# Patient Record
Sex: FEMALE | Race: WHITE | Employment: OTHER | URBAN - METROPOLITAN AREA
[De-identification: names, ages, dates, MRNs, and addresses within clinical notes are randomized per-mention and may not be internally consistent; named-entity substitution may affect disease eponyms.]

---

## 2018-07-25 ENCOUNTER — APPOINTMENT (OUTPATIENT)
Dept: CARDIOLOGY | Facility: CLINIC | Age: 83
End: 2018-07-25
Attending: PHYSICIAN ASSISTANT
Payer: MEDICARE

## 2018-07-25 ENCOUNTER — APPOINTMENT (OUTPATIENT)
Dept: CT IMAGING | Facility: CLINIC | Age: 83
End: 2018-07-25
Attending: EMERGENCY MEDICINE
Payer: MEDICARE

## 2018-07-25 ENCOUNTER — APPOINTMENT (OUTPATIENT)
Dept: GENERAL RADIOLOGY | Facility: CLINIC | Age: 83
End: 2018-07-25
Attending: EMERGENCY MEDICINE
Payer: MEDICARE

## 2018-07-25 ENCOUNTER — HOSPITAL ENCOUNTER (OUTPATIENT)
Facility: CLINIC | Age: 83
Setting detail: OBSERVATION
Discharge: NURSING, REHAB OR LONG-TERM CARE | End: 2018-07-26
Attending: EMERGENCY MEDICINE | Admitting: INTERNAL MEDICINE
Payer: MEDICARE

## 2018-07-25 DIAGNOSIS — J20.9 ACUTE BRONCHITIS, UNSPECIFIED ORGANISM: Primary | ICD-10-CM

## 2018-07-25 DIAGNOSIS — R79.89 ELEVATED TROPONIN: ICD-10-CM

## 2018-07-25 DIAGNOSIS — R30.0 DYSURIA: ICD-10-CM

## 2018-07-25 DIAGNOSIS — R07.9 ACUTE CHEST PAIN: ICD-10-CM

## 2018-07-25 DIAGNOSIS — R79.89 TROPONIN LEVEL ELEVATED: ICD-10-CM

## 2018-07-25 DIAGNOSIS — I51.89 DIASTOLIC DYSFUNCTION: ICD-10-CM

## 2018-07-25 LAB
ALBUMIN SERPL-MCNC: 3 G/DL (ref 3.4–5)
ALP SERPL-CCNC: 89 U/L (ref 40–150)
ALT SERPL W P-5'-P-CCNC: 18 U/L (ref 0–50)
ANION GAP SERPL CALCULATED.3IONS-SCNC: 7 MMOL/L (ref 3–14)
AST SERPL W P-5'-P-CCNC: 20 U/L (ref 0–45)
BASOPHILS # BLD AUTO: 0 10E9/L (ref 0–0.2)
BASOPHILS NFR BLD AUTO: 0.3 %
BILIRUB SERPL-MCNC: 1.1 MG/DL (ref 0.2–1.3)
BUN SERPL-MCNC: 17 MG/DL (ref 7–30)
CALCIUM SERPL-MCNC: 8.4 MG/DL (ref 8.5–10.1)
CHLORIDE SERPL-SCNC: 103 MMOL/L (ref 94–109)
CO2 BLDCOV-SCNC: 26 MMOL/L (ref 21–28)
CO2 SERPL-SCNC: 25 MMOL/L (ref 20–32)
CREAT SERPL-MCNC: 1.09 MG/DL (ref 0.52–1.04)
DIFFERENTIAL METHOD BLD: ABNORMAL
EOSINOPHIL # BLD AUTO: 0 10E9/L (ref 0–0.7)
EOSINOPHIL NFR BLD AUTO: 0.1 %
ERYTHROCYTE [DISTWIDTH] IN BLOOD BY AUTOMATED COUNT: 13.2 % (ref 10–15)
ERYTHROCYTE [DISTWIDTH] IN BLOOD BY AUTOMATED COUNT: 13.4 % (ref 10–15)
GFR SERPL CREATININE-BSD FRML MDRD: 47 ML/MIN/1.7M2
GLUCOSE SERPL-MCNC: 121 MG/DL (ref 70–99)
HCT VFR BLD AUTO: 44.1 % (ref 35–47)
HCT VFR BLD AUTO: 44.3 % (ref 35–47)
HGB BLD-MCNC: 14.2 G/DL (ref 11.7–15.7)
HGB BLD-MCNC: 14.4 G/DL (ref 11.7–15.7)
IMM GRANULOCYTES # BLD: 0.1 10E9/L (ref 0–0.4)
IMM GRANULOCYTES NFR BLD: 0.4 %
INTERPRETATION ECG - MUSE: NORMAL
LACTATE BLD-SCNC: 0.8 MMOL/L (ref 0.7–2.1)
LIPASE SERPL-CCNC: 73 U/L (ref 73–393)
LYMPHOCYTES # BLD AUTO: 1.4 10E9/L (ref 0.8–5.3)
LYMPHOCYTES NFR BLD AUTO: 8.9 %
MCH RBC QN AUTO: 30.6 PG (ref 26.5–33)
MCH RBC QN AUTO: 31.1 PG (ref 26.5–33)
MCHC RBC AUTO-ENTMCNC: 32.2 G/DL (ref 31.5–36.5)
MCHC RBC AUTO-ENTMCNC: 32.5 G/DL (ref 31.5–36.5)
MCV RBC AUTO: 94 FL (ref 78–100)
MCV RBC AUTO: 97 FL (ref 78–100)
MONOCYTES # BLD AUTO: 1.4 10E9/L (ref 0–1.3)
MONOCYTES NFR BLD AUTO: 9.2 %
NEUTROPHILS # BLD AUTO: 12.7 10E9/L (ref 1.6–8.3)
NEUTROPHILS NFR BLD AUTO: 81.1 %
NRBC # BLD AUTO: 0 10*3/UL
NRBC BLD AUTO-RTO: 0 /100
NT-PROBNP SERPL-MCNC: 2610 PG/ML (ref 0–1800)
PCO2 BLDV: 38 MM HG (ref 40–50)
PH BLDV: 7.45 PH (ref 7.32–7.43)
PLATELET # BLD AUTO: 188 10E9/L (ref 150–450)
PLATELET # BLD AUTO: 199 10E9/L (ref 150–450)
PO2 BLDV: 34 MM HG (ref 25–47)
POTASSIUM SERPL-SCNC: 3.5 MMOL/L (ref 3.4–5.3)
PROCALCITONIN SERPL-MCNC: 0.19 NG/ML
PROT SERPL-MCNC: 7.5 G/DL (ref 6.8–8.8)
RBC # BLD AUTO: 4.56 10E12/L (ref 3.8–5.2)
RBC # BLD AUTO: 4.71 10E12/L (ref 3.8–5.2)
SAO2 % BLDV FROM PO2: 69 %
SODIUM SERPL-SCNC: 135 MMOL/L (ref 133–144)
TROPONIN I SERPL-MCNC: 0.12 UG/L (ref 0–0.04)
TROPONIN I SERPL-MCNC: 0.15 UG/L (ref 0–0.04)
TROPONIN I SERPL-MCNC: 0.18 UG/L (ref 0–0.04)
WBC # BLD AUTO: 12.2 10E9/L (ref 4–11)
WBC # BLD AUTO: 15.7 10E9/L (ref 4–11)

## 2018-07-25 PROCEDURE — 96366 THER/PROPH/DIAG IV INF ADDON: CPT

## 2018-07-25 PROCEDURE — 83690 ASSAY OF LIPASE: CPT | Performed by: EMERGENCY MEDICINE

## 2018-07-25 PROCEDURE — 87040 BLOOD CULTURE FOR BACTERIA: CPT | Performed by: PHYSICIAN ASSISTANT

## 2018-07-25 PROCEDURE — 25000125 ZZHC RX 250: Performed by: EMERGENCY MEDICINE

## 2018-07-25 PROCEDURE — 83605 ASSAY OF LACTIC ACID: CPT

## 2018-07-25 PROCEDURE — 85027 COMPLETE CBC AUTOMATED: CPT | Mod: XU | Performed by: INTERNAL MEDICINE

## 2018-07-25 PROCEDURE — 25000128 H RX IP 250 OP 636: Performed by: EMERGENCY MEDICINE

## 2018-07-25 PROCEDURE — 96365 THER/PROPH/DIAG IV INF INIT: CPT | Mod: 59

## 2018-07-25 PROCEDURE — 93306 TTE W/DOPPLER COMPLETE: CPT

## 2018-07-25 PROCEDURE — 36415 COLL VENOUS BLD VENIPUNCTURE: CPT | Performed by: EMERGENCY MEDICINE

## 2018-07-25 PROCEDURE — 84145 PROCALCITONIN (PCT): CPT | Performed by: PHYSICIAN ASSISTANT

## 2018-07-25 PROCEDURE — 96375 TX/PRO/DX INJ NEW DRUG ADDON: CPT

## 2018-07-25 PROCEDURE — 96361 HYDRATE IV INFUSION ADD-ON: CPT

## 2018-07-25 PROCEDURE — 85025 COMPLETE CBC W/AUTO DIFF WBC: CPT | Performed by: EMERGENCY MEDICINE

## 2018-07-25 PROCEDURE — 96368 THER/DIAG CONCURRENT INF: CPT | Mod: 59

## 2018-07-25 PROCEDURE — G0378 HOSPITAL OBSERVATION PER HR: HCPCS

## 2018-07-25 PROCEDURE — 40000274 ZZH STATISTIC RCP CONSULT EA 30 MIN

## 2018-07-25 PROCEDURE — 93005 ELECTROCARDIOGRAM TRACING: CPT

## 2018-07-25 PROCEDURE — 99285 EMERGENCY DEPT VISIT HI MDM: CPT | Mod: 25

## 2018-07-25 PROCEDURE — 25000132 ZZH RX MED GY IP 250 OP 250 PS 637: Mod: GY | Performed by: PHYSICIAN ASSISTANT

## 2018-07-25 PROCEDURE — A9270 NON-COVERED ITEM OR SERVICE: HCPCS | Mod: GY | Performed by: PHYSICIAN ASSISTANT

## 2018-07-25 PROCEDURE — 36415 COLL VENOUS BLD VENIPUNCTURE: CPT | Performed by: PHYSICIAN ASSISTANT

## 2018-07-25 PROCEDURE — 25000132 ZZH RX MED GY IP 250 OP 250 PS 637: Mod: GY | Performed by: INTERNAL MEDICINE

## 2018-07-25 PROCEDURE — 84484 ASSAY OF TROPONIN QUANT: CPT | Performed by: PHYSICIAN ASSISTANT

## 2018-07-25 PROCEDURE — 71046 X-RAY EXAM CHEST 2 VIEWS: CPT

## 2018-07-25 PROCEDURE — 96367 TX/PROPH/DG ADDL SEQ IV INF: CPT | Mod: 59

## 2018-07-25 PROCEDURE — A9270 NON-COVERED ITEM OR SERVICE: HCPCS | Mod: GY | Performed by: INTERNAL MEDICINE

## 2018-07-25 PROCEDURE — 36415 COLL VENOUS BLD VENIPUNCTURE: CPT | Performed by: INTERNAL MEDICINE

## 2018-07-25 PROCEDURE — 99214 OFFICE O/P EST MOD 30 MIN: CPT | Performed by: INTERNAL MEDICINE

## 2018-07-25 PROCEDURE — 94640 AIRWAY INHALATION TREATMENT: CPT

## 2018-07-25 PROCEDURE — 83880 ASSAY OF NATRIURETIC PEPTIDE: CPT | Performed by: PHYSICIAN ASSISTANT

## 2018-07-25 PROCEDURE — 84484 ASSAY OF TROPONIN QUANT: CPT | Performed by: EMERGENCY MEDICINE

## 2018-07-25 PROCEDURE — 93306 TTE W/DOPPLER COMPLETE: CPT | Mod: 26 | Performed by: INTERNAL MEDICINE

## 2018-07-25 PROCEDURE — 87040 BLOOD CULTURE FOR BACTERIA: CPT | Performed by: EMERGENCY MEDICINE

## 2018-07-25 PROCEDURE — 80053 COMPREHEN METABOLIC PANEL: CPT | Performed by: EMERGENCY MEDICINE

## 2018-07-25 PROCEDURE — 25000128 H RX IP 250 OP 636: Performed by: PHYSICIAN ASSISTANT

## 2018-07-25 PROCEDURE — 71260 CT THORAX DX C+: CPT

## 2018-07-25 PROCEDURE — 25000132 ZZH RX MED GY IP 250 OP 250 PS 637: Performed by: EMERGENCY MEDICINE

## 2018-07-25 PROCEDURE — 25000125 ZZHC RX 250

## 2018-07-25 PROCEDURE — A9270 NON-COVERED ITEM OR SERVICE: HCPCS | Mod: GY | Performed by: EMERGENCY MEDICINE

## 2018-07-25 PROCEDURE — 82803 BLOOD GASES ANY COMBINATION: CPT

## 2018-07-25 RX ORDER — MECLIZINE HYDROCHLORIDE 25 MG/1
25 TABLET ORAL 3 TIMES DAILY PRN
Status: ON HOLD | COMMUNITY
End: 2021-01-08

## 2018-07-25 RX ORDER — ONDANSETRON 4 MG/1
4 TABLET, ORALLY DISINTEGRATING ORAL EVERY 6 HOURS PRN
Status: DISCONTINUED | OUTPATIENT
Start: 2018-07-25 | End: 2018-07-26 | Stop reason: HOSPADM

## 2018-07-25 RX ORDER — CEFTRIAXONE 1 G/1
1 INJECTION, POWDER, FOR SOLUTION INTRAMUSCULAR; INTRAVENOUS ONCE
Status: COMPLETED | OUTPATIENT
Start: 2018-07-25 | End: 2018-07-25

## 2018-07-25 RX ORDER — IPRATROPIUM BROMIDE AND ALBUTEROL SULFATE 2.5; .5 MG/3ML; MG/3ML
SOLUTION RESPIRATORY (INHALATION)
Status: COMPLETED
Start: 2018-07-25 | End: 2018-07-25

## 2018-07-25 RX ORDER — ATENOLOL 50 MG/1
50 TABLET ORAL DAILY
Status: ON HOLD | COMMUNITY
End: 2018-07-25

## 2018-07-25 RX ORDER — ONDANSETRON 2 MG/ML
4 INJECTION INTRAMUSCULAR; INTRAVENOUS EVERY 6 HOURS PRN
Status: DISCONTINUED | OUTPATIENT
Start: 2018-07-25 | End: 2018-07-26 | Stop reason: HOSPADM

## 2018-07-25 RX ORDER — IPRATROPIUM BROMIDE AND ALBUTEROL SULFATE 2.5; .5 MG/3ML; MG/3ML
3 SOLUTION RESPIRATORY (INHALATION) ONCE
Status: COMPLETED | OUTPATIENT
Start: 2018-07-25 | End: 2018-07-25

## 2018-07-25 RX ORDER — IPRATROPIUM BROMIDE AND ALBUTEROL SULFATE 2.5; .5 MG/3ML; MG/3ML
3 SOLUTION RESPIRATORY (INHALATION)
Status: DISCONTINUED | OUTPATIENT
Start: 2018-07-25 | End: 2018-07-25

## 2018-07-25 RX ORDER — METHYLPREDNISOLONE SODIUM SUCCINATE 125 MG/2ML
125 INJECTION, POWDER, LYOPHILIZED, FOR SOLUTION INTRAMUSCULAR; INTRAVENOUS ONCE
Status: COMPLETED | OUTPATIENT
Start: 2018-07-25 | End: 2018-07-25

## 2018-07-25 RX ORDER — IOPAMIDOL 755 MG/ML
500 INJECTION, SOLUTION INTRAVASCULAR ONCE
Status: COMPLETED | OUTPATIENT
Start: 2018-07-25 | End: 2018-07-25

## 2018-07-25 RX ORDER — AMOXICILLIN 250 MG
2 CAPSULE ORAL 2 TIMES DAILY PRN
Status: DISCONTINUED | OUTPATIENT
Start: 2018-07-25 | End: 2018-07-26 | Stop reason: HOSPADM

## 2018-07-25 RX ORDER — ASPIRIN 81 MG/1
324 TABLET, CHEWABLE ORAL ONCE
Status: COMPLETED | OUTPATIENT
Start: 2018-07-25 | End: 2018-07-25

## 2018-07-25 RX ORDER — METOPROLOL SUCCINATE 50 MG/1
50 TABLET, EXTENDED RELEASE ORAL DAILY
Status: DISCONTINUED | OUTPATIENT
Start: 2018-07-25 | End: 2018-07-26 | Stop reason: HOSPADM

## 2018-07-25 RX ORDER — ALBUTEROL SULFATE 0.83 MG/ML
2.5 SOLUTION RESPIRATORY (INHALATION)
Status: DISCONTINUED | OUTPATIENT
Start: 2018-07-25 | End: 2018-07-25

## 2018-07-25 RX ORDER — FAMOTIDINE 20 MG/1
20 TABLET, FILM COATED ORAL DAILY
Status: DISCONTINUED | OUTPATIENT
Start: 2018-07-26 | End: 2018-07-26 | Stop reason: HOSPADM

## 2018-07-25 RX ORDER — AMOXICILLIN 250 MG
1 CAPSULE ORAL 2 TIMES DAILY PRN
Status: DISCONTINUED | OUTPATIENT
Start: 2018-07-25 | End: 2018-07-26 | Stop reason: HOSPADM

## 2018-07-25 RX ORDER — FAMOTIDINE 20 MG/1
20 TABLET, FILM COATED ORAL 2 TIMES DAILY
Status: DISCONTINUED | OUTPATIENT
Start: 2018-07-25 | End: 2018-07-25

## 2018-07-25 RX ORDER — ASPIRIN 81 MG/1
81 TABLET, CHEWABLE ORAL DAILY
Status: ON HOLD | COMMUNITY
End: 2021-01-08

## 2018-07-25 RX ORDER — ALBUTEROL SULFATE 0.83 MG/ML
2.5 SOLUTION RESPIRATORY (INHALATION)
Status: DISCONTINUED | OUTPATIENT
Start: 2018-07-25 | End: 2018-07-26 | Stop reason: HOSPADM

## 2018-07-25 RX ORDER — BENZONATATE 100 MG/1
100 CAPSULE ORAL 3 TIMES DAILY PRN
Status: DISCONTINUED | OUTPATIENT
Start: 2018-07-25 | End: 2018-07-26 | Stop reason: HOSPADM

## 2018-07-25 RX ORDER — MORPHINE SULFATE 4 MG/ML
1 INJECTION, SOLUTION INTRAMUSCULAR; INTRAVENOUS
Status: DISCONTINUED | OUTPATIENT
Start: 2018-07-25 | End: 2018-07-26 | Stop reason: HOSPADM

## 2018-07-25 RX ORDER — MUPIROCIN CALCIUM 20 MG/G
CREAM TOPICAL 2 TIMES DAILY PRN
Status: ON HOLD | COMMUNITY
End: 2021-01-03

## 2018-07-25 RX ORDER — MORPHINE SULFATE 2 MG/ML
2 INJECTION, SOLUTION INTRAMUSCULAR; INTRAVENOUS ONCE
Status: DISCONTINUED | OUTPATIENT
Start: 2018-07-25 | End: 2018-07-26 | Stop reason: HOSPADM

## 2018-07-25 RX ORDER — ANTIOX #8/OM3/DHA/EPA/LUT/ZEAX 250-2.5 MG
1 CAPSULE ORAL DAILY
Status: ON HOLD | COMMUNITY
End: 2021-01-08

## 2018-07-25 RX ORDER — ACETAMINOPHEN 325 MG/1
650 TABLET ORAL EVERY 4 HOURS PRN
Status: DISCONTINUED | OUTPATIENT
Start: 2018-07-25 | End: 2018-07-26 | Stop reason: HOSPADM

## 2018-07-25 RX ORDER — ASPIRIN 81 MG/1
81 TABLET, CHEWABLE ORAL DAILY
Status: DISCONTINUED | OUTPATIENT
Start: 2018-07-26 | End: 2018-07-26 | Stop reason: HOSPADM

## 2018-07-25 RX ORDER — NALOXONE HYDROCHLORIDE 0.4 MG/ML
.1-.4 INJECTION, SOLUTION INTRAMUSCULAR; INTRAVENOUS; SUBCUTANEOUS
Status: DISCONTINUED | OUTPATIENT
Start: 2018-07-25 | End: 2018-07-26 | Stop reason: HOSPADM

## 2018-07-25 RX ORDER — GUAIFENESIN/DEXTROMETHORPHAN 100-10MG/5
5 SYRUP ORAL EVERY 4 HOURS PRN
Status: DISCONTINUED | OUTPATIENT
Start: 2018-07-25 | End: 2018-07-26 | Stop reason: HOSPADM

## 2018-07-25 RX ORDER — LIDOCAINE 40 MG/G
CREAM TOPICAL
Status: DISCONTINUED | OUTPATIENT
Start: 2018-07-25 | End: 2018-07-26 | Stop reason: HOSPADM

## 2018-07-25 RX ORDER — AMOXICILLIN 250 MG
1 CAPSULE ORAL DAILY PRN
Status: ON HOLD | COMMUNITY
End: 2021-01-08

## 2018-07-25 RX ORDER — IPRATROPIUM BROMIDE AND ALBUTEROL SULFATE 2.5; .5 MG/3ML; MG/3ML
3 SOLUTION RESPIRATORY (INHALATION) EVERY 4 HOURS
Status: DISCONTINUED | OUTPATIENT
Start: 2018-07-25 | End: 2018-07-25

## 2018-07-25 RX ORDER — METOPROLOL SUCCINATE 50 MG/1
50 TABLET, EXTENDED RELEASE ORAL DAILY
Status: ON HOLD | COMMUNITY
End: 2021-01-08

## 2018-07-25 RX ORDER — SODIUM CHLORIDE 9 MG/ML
INJECTION, SOLUTION INTRAVENOUS CONTINUOUS
Status: DISCONTINUED | OUTPATIENT
Start: 2018-07-25 | End: 2018-07-25

## 2018-07-25 RX ORDER — ACETAMINOPHEN 325 MG/1
650 TABLET ORAL EVERY 8 HOURS PRN
Status: ON HOLD | COMMUNITY
End: 2021-01-08

## 2018-07-25 RX ORDER — ATORVASTATIN CALCIUM 10 MG/1
10 TABLET, FILM COATED ORAL EVERY EVENING
Status: DISCONTINUED | OUTPATIENT
Start: 2018-07-25 | End: 2018-07-26 | Stop reason: HOSPADM

## 2018-07-25 RX ORDER — POLYETHYLENE GLYCOL 3350 17 G/17G
17 POWDER, FOR SOLUTION ORAL DAILY PRN
Status: DISCONTINUED | OUTPATIENT
Start: 2018-07-25 | End: 2018-07-26 | Stop reason: HOSPADM

## 2018-07-25 RX ORDER — SODIUM CHLORIDE 9 MG/ML
1000 INJECTION, SOLUTION INTRAVENOUS CONTINUOUS
Status: DISCONTINUED | OUTPATIENT
Start: 2018-07-25 | End: 2018-07-25

## 2018-07-25 RX ADMIN — IOPAMIDOL 68 ML: 755 INJECTION, SOLUTION INTRAVENOUS at 06:31

## 2018-07-25 RX ADMIN — SODIUM CHLORIDE 82 ML: 900 INJECTION, SOLUTION INTRAVENOUS at 06:31

## 2018-07-25 RX ADMIN — METOPROLOL SUCCINATE 50 MG: 50 TABLET, EXTENDED RELEASE ORAL at 12:29

## 2018-07-25 RX ADMIN — FAMOTIDINE 20 MG: 20 TABLET, FILM COATED ORAL at 12:28

## 2018-07-25 RX ADMIN — ASPIRIN 81 MG 324 MG: 81 TABLET ORAL at 05:54

## 2018-07-25 RX ADMIN — HEPARIN SODIUM 800 UNITS/HR: 10000 INJECTION, SOLUTION INTRAVENOUS at 09:22

## 2018-07-25 RX ADMIN — IPRATROPIUM BROMIDE AND ALBUTEROL SULFATE: 2.5; .5 SOLUTION RESPIRATORY (INHALATION) at 04:19

## 2018-07-25 RX ADMIN — BENZONATATE 100 MG: 100 CAPSULE ORAL at 16:11

## 2018-07-25 RX ADMIN — METHYLPREDNISOLONE SODIUM SUCCINATE 125 MG: 125 INJECTION, POWDER, FOR SOLUTION INTRAMUSCULAR; INTRAVENOUS at 05:53

## 2018-07-25 RX ADMIN — ALBUTEROL SULFATE 2.5 MG: 2.5 SOLUTION RESPIRATORY (INHALATION) at 05:59

## 2018-07-25 RX ADMIN — IPRATROPIUM BROMIDE AND ALBUTEROL SULFATE 3 ML: .5; 3 SOLUTION RESPIRATORY (INHALATION) at 05:02

## 2018-07-25 RX ADMIN — AZITHROMYCIN MONOHYDRATE 500 MG: 500 INJECTION, POWDER, LYOPHILIZED, FOR SOLUTION INTRAVENOUS at 05:15

## 2018-07-25 RX ADMIN — SODIUM CHLORIDE: 9 INJECTION, SOLUTION INTRAVENOUS at 10:14

## 2018-07-25 RX ADMIN — SODIUM CHLORIDE 1000 ML: 9 INJECTION, SOLUTION INTRAVENOUS at 04:20

## 2018-07-25 RX ADMIN — IPRATROPIUM BROMIDE AND ALBUTEROL SULFATE 3 ML: 2.5; .5 SOLUTION RESPIRATORY (INHALATION) at 05:02

## 2018-07-25 RX ADMIN — Medication 4000 UNITS: at 09:21

## 2018-07-25 RX ADMIN — IPRATROPIUM BROMIDE AND ALBUTEROL SULFATE: .5; 3 SOLUTION RESPIRATORY (INHALATION) at 04:19

## 2018-07-25 RX ADMIN — CEFTRIAXONE SODIUM 1 G: 1 INJECTION, POWDER, FOR SOLUTION INTRAMUSCULAR; INTRAVENOUS at 04:58

## 2018-07-25 RX ADMIN — ATORVASTATIN CALCIUM 10 MG: 10 TABLET, FILM COATED ORAL at 20:13

## 2018-07-25 NOTE — PROGRESS NOTES
Cardiology follow-up note:    The patient's repeat troponin has dropped and her transthoracic echocardiogram shows preserved systolic function with no regional wall motion abnormalities.  I had a long discussion with the patient's daughters and patient.  At this time they elected medical management which I think is very reasonable.  We did discuss a stress test and have elected to not proceed with a stress test but rather medical therapy.  I would recommend antiplatelet treatment with aspirin 81 mg a day, statin therapy with atorvastatin and to also continue her beta blocker unchanged.  The patient's transthoracic echocardiogram shows grade 3 diastolic dysfunction and her N-terminal proBNP is elevated, however, she does not appear markedly volume overloaded on physical examination and her CT scan did not show pulmonary vascular congestion.  Consideration could be made for a low dose loop diuretic such as Lasix 10 mg daily to avoid acute decompensated heart failure in the future.  Cardiology will sign off at this time.  Please reconsult if needed.    Oscar Jalloh MD

## 2018-07-25 NOTE — IP AVS SNAPSHOT
[place] [address] [facility]    [address]    [facility] [address]    Phone:  [phone]    Fax:  [phone]                                       After Visit Summary   7/25/2018    Leighann Regan    MRN: [id number]           After Visit Summary Signature Page     I have received my discharge instructions, and my questions have been answered. I have discussed any challenges I see with this plan with the nurse or doctor.    ..........................................................................................................................................  Patient/Patient Representative Signature      ..........................................................................................................................................  Patient Representative Print Name and Relationship to Patient    ..................................................               ................................................  Date                                            Time    ..........................................................................................................................................  Reviewed by Signature/Title    ...................................................              ..............................................  Date                                                            Time

## 2018-07-25 NOTE — ED NOTES
Bed: ED15  Expected date:   Expected time:   Means of arrival:   Comments:  arpita 86yo fever., sob

## 2018-07-25 NOTE — PHARMACY-ADMISSION MEDICATION HISTORY
Admission medication history interview status for this patient is complete. See [facility] admission navigator for allergy information, prior to admission medications and immunization status.     Medication history interview source(s):[facility] med list   Medication history resources (including written lists, pill bottles, clinic record):{[id number]::None    Changes made to PTA medication list:  Added: all  Deleted: none  Changed: none    Actions taken by pharmacist (provider contacted, etc): Called [facility] ([phone]) for last doses.      Additional medication history information: None      Medication reconciliation/reorder completed by provider prior to medication history? No    Do you take OTC medications (eg tylenol, ibuprofen, fish oil, eye/ear drops, etc)? N(Y/N)    For patients on insulin therapy: N (Y/N)    Time spent in this activity: 15 min    Prior to Admission medications    Medication Sig Last Dose Taking? Auth Provider   acetaminophen (TYLENOL) 325 MG tablet Take 650 mg by mouth every 8 hours as needed for mild pain  at prn Yes Unknown, Entered By History   aspirin 81 MG chewable tablet Take 81 mg by mouth daily 7/24/2018 at Unknown time Yes Unknown, Entered By History   Calcium Carb-Cholecalciferol 600-800 MG-UNIT TABS Take 1 tablet by mouth daily 7/24/2018 at Unknown time Yes Unknown, Entered By History   meclizine (ANTIVERT) 25 MG tablet Take 25 mg by mouth 3 times daily as needed for dizziness  at prn Yes Unknown, Entered By History   metoprolol succinate (TOPROL-XL) 50 MG 24 hr tablet Take 50 mg by mouth daily 7/24/2018 at clarifying which bb with [facility] Yes Unknown, Entered By History   Multiple Vitamins-Minerals (PRESERVISION AREDS 2) CAPS Take 1 capsule by mouth daily 7/24/2018 at Unknown time Yes Unknown, Entered By History   mupirocin (BACTROBAN) 2 % cream Apply topically 2 times daily as needed Apply to scalp  at prn Yes Unknown, Entered By History   senna-docusate (SENOKOT-S;PERICOLACE)  8.6-50 MG per tablet Take 1 tablet by mouth daily as needed for constipation  at prn Yes Unknown, Entered By History

## 2018-07-25 NOTE — IP AVS SNAPSHOT
"          [place] [address] MEDICAL SURGICAL: [phone]                                              INTERAGENCY TRANSFER FORM - LAB / IMAGING / EKG / EMG RESULTS   2018                    Hospital Admission Date: 2018  ADELAIDA HANSON   : 1/15/1931  Sex: Female        Attending Provider: Abhishek Chavira MD     Allergies:  Codeine, Ibuprofen, Latex, Sulfa Drugs, Tramadol    Infection:  None   Service:  GENERAL MEDI    Ht:  1.651 m (5' 5\")   Wt:  83.1 kg (183 lb 3.2 oz)   Admission Wt:  82.3 kg (181 lb 6.4 oz)    BMI:  30.49 kg/m 2   BSA:  1.95 m 2            Patient PCP Information     Provider PCP Type    Zachary Sky MD General         Lab Results - 3 Days      Beta strep group A culture [[id number]]  Resulted: 18 1123, Result status: In process    Ordering provider: Abhishek Chavira MD  18 1048 Resulting lab: [facility]    Specimen Information    Type Source Collected On     18 1048            Rapid strep screen [[id number]]  Resulted: 18 1122, Result status: Final result    Ordering provider: Gabriela Garcia MD  18 1045 Resulting lab: [facility]    Specimen Information    Type Source Collected On   Throat  18 1048          Components       Value Reference Range Flag Lab   Specimen Description Throat      Rapid Strep A Screen NEGATIVE: No Group A streptococcal antigen detected by immunoassay, await culture report.   FrRdHs            UA with Microscopic reflex to Culture [[id number]] (Abnormal)  Resulted: 18 1056, Result status: Final result    Ordering provider: Brianda Watkins PA-C  18 1000 Resulting lab: [facility]    Specimen Information    Type Source Collected On   Midstream Urine Urine Midstream 18 0948          Components       Value Reference Range Flag Lab   Color Urine Yellow   FrRdHs   Appearance Urine Cloudy   FrRdHs   Glucose Urine Negative NEG^Negative mg/dL  FrRdHs   Bilirubin Urine Negative " NEG^Negative  FrRdHs   Ketones Urine Negative NEG^Negative mg/dL  FrRdHs   Specific Gravity Urine 1.026 1.003 - 1.035  FrRdHs   Blood Urine Moderate NEG^Negative A FrRdHs   pH Urine 6.0 5.0 - 7.0 pH  FrRdHs   Protein Albumin Urine 30 NEG^Negative mg/dL A FrRdHs   Urobilinogen mg/dL 0.0 0.0 - 2.0 mg/dL  FrRdHs   Nitrite Urine Negative NEG^Negative  FrRdHs   Leukocyte Esterase Urine Negative NEG^Negative  FrRdHs   Source Midstream Urine   FrRdHs   WBC Urine 2 0 - 5 /HPF  FrRdHs   RBC Urine 18 0 - 2 /HPF H FrRdHs   Bacteria Urine Few NEG^Negative /HPF A FrRdHs   Squamous Epithelial /HPF Urine 4 0 - 1 /HPF H FrRdHs   Mucous Urine Present NEG^Negative /LPF A FrRdHs            Basic metabolic panel [[id number]] (Abnormal)  Resulted: 07/26/18 0859, Result status: Final result    Ordering provider: Brianda Watkins PA-C  07/26/18 0000 Resulting lab: [facility]    Specimen Information    Type Source Collected On   Blood  07/26/18 0743          Components       Value Reference Range Flag Lab   Sodium 141 133 - 144 mmol/L  FrRdHs   Potassium 3.8 3.4 - 5.3 mmol/L  FrRdHs   Chloride 110 94 - 109 mmol/L H FrRdHs   Carbon Dioxide 24 20 - 32 mmol/L  FrRdHs   Anion Gap 7 3 - 14 mmol/L  FrRdHs   Glucose 111 70 - 99 mg/dL H FrRdHs   Urea Nitrogen 23 7 - 30 mg/dL  FrRdHs   Creatinine 1.01 0.52 - 1.04 mg/dL  FrRdHs   GFR Estimate 52 >60 mL/min/1.7m2 L FrRdHs   Comment:  Non  GFR Calc   GFR Estimate If Black 63 >60 mL/min/1.7m2  FrRdHs   Comment:  African American GFR Calc   Calcium 8.0 8.5 - 10.1 mg/dL L FrRdHs            Heparin Xa (10a) Level [[id number]]  Resulted: 07/26/18 0842, Result status: Final result    Ordering provider: Alvaro Lemons MD  07/26/18 0500 Resulting lab: [facility]    Specimen Information    Type Source Collected On   Blood  07/26/18 0743          Components       Value Reference Range Flag Lab   Heparin 10A Level <0.10 IU/mL  [facility]   Comment:          Therapeutic Range:                UFH: 0.15-0.35 IU/mL for low                     intensity dosing                     0.30-0.70 IU/mL for high                     intensity dosing for                     DVT and PE  Enoxaparin: If administered              once daily with dose              1.5mg/k.0-2.0 IU/mL              If administered              twice daily with dose              1mg/k.60-1.0 IU/mL  This test is not validated for other direct factor X inhibitors (e.g.   rivaroxaban, apixaban, edoxaban, betrixaban, fondaparinux) and should not be   used for monitoring of other medications.              CBC with platelets [[id number]] (Abnormal)  Resulted: 18 08, Result status: Final result    Ordering provider: Brianda Watkins PA-C  18 0000 Resulting lab: [facility]    Specimen Information    Type Source Collected On   Blood  18 0743          Components       Value Reference Range Flag Lab   WBC 14.5 4.0 - 11.0 10e9/L H FrRdHs   RBC Count 3.97 3.8 - 5.2 10e12/L  FrRdHs   Hemoglobin 12.4 11.7 - 15.7 g/dL  FrRdHs   Hematocrit 38.0 35.0 - 47.0 %  FrRdHs   MCV 96 78 - 100 fl  FrRdHs   MCH 31.2 26.5 - 33.0 pg  FrRdHs   MCHC 32.6 31.5 - 36.5 g/dL  FrRdHs   RDW 13.4 10.0 - 15.0 %  FrRdHs   Platelet Count 216 150 - 450 10e9/L  FrRdHs            Blood culture [[id number]]  Resulted: 18, Result status: Preliminary result    Ordering provider: Alvaro Lemons MD  18 0408 Resulting lab: [facility]    Specimen Information    Type Source Collected On   Blood  18 0412   Comment:  Right Arm          Components       Value Reference Range Flag Lab   Specimen Description Blood Right Arm      Special Requests Aerobic and anaerobic bottles received   75   Culture Micro No growth after 22 hours   225            Blood culture [[id number]]  Resulted: 18, Result status: Preliminary result    Ordering provider: Cristo  Alvaro Dixon MD  07/25/18 0412 Resulting lab: [facility]    Specimen Information    Type Source Collected On   Blood  07/25/18 0457   Comment:  Left Hand          Components       Value Reference Range Flag Lab   Specimen Description Blood Left Hand      Special Requests Received in aerobic bottle only   75   Culture Micro No growth after 22 hours   225            Blood culture [[id number]]  Resulted: 07/26/18 0646, Result status: Preliminary result    Ordering provider: Brianda Watkins PA-C  07/25/18 1000 Resulting lab: [facility]    Specimen Information    Type Source Collected On   Blood  07/25/18 1137   Comment:  Left Arm          Components       Value Reference Range Flag Lab   Specimen Description Blood Left Arm      Special Requests Aerobic and anaerobic bottles received   75   Culture Micro No growth after 15 hours   226            Troponin I [[id number]] (Abnormal)  Resulted: 07/26/18 0042, Result status: Final result    Ordering provider: Brianda Watkins PA-C  07/25/18 1800 Resulting lab: [facility]    Specimen Information    Type Source Collected On   Blood  07/26/18 0012          Components       Value Reference Range Flag Lab   Troponin I ES 0.118 0.000 - 0.045 ug/L [facility]   Comment:         The 99th percentile for upper reference range is 0.045 ug/L.  Troponin values   in the range of 0.045 - 0.120 ug/L may be associated with risks of adverse   clinical events.              Troponin I [[id number]] (Abnormal)  Resulted: 07/25/18 1833, Result status: Final result    Ordering provider: Brianda Watkins PA-C  07/25/18 1200 Resulting lab: [facility]    Specimen Information    Type Source Collected On   Blood  07/25/18 1801          Components       Value Reference Range Flag Lab   Troponin I ES 0.123 0.000 - 0.045 ug/L  FrRd   Comment:         The 99th percentile for upper reference range is 0.045 ug/L.  Troponin values   in  the range of 0.045 - 0.120 ug/L may be associated with risks of adverse   clinical events.  Consistent with previous critical result              Procalcitonin [[id number]]  Resulted: 07/25/18 1657, Result status: Final result    Ordering provider: Brianda Watkins PA-C  07/25/18 1000 Resulting lab: [facility]    Specimen Information    Type Source Collected On   Blood  07/25/18 1138          Components       Value Reference Range Flag Lab   Procalcitonin 0.19 ng/ml  FrStHsLb   Comment:         0.05-0.24 ng/ml Low risk of systemic bacterial infection. Local bacterial   infection possible.  Recommendation: Assess other clinical features of   infection. Discourage antibiotics unless strong clinical suspicion for serious   infection.              CBC with platelets [[id number]] (Abnormal)  Resulted: 07/25/18 1248, Result status: Final result    Ordering provider: Abhishek Chavira MD  07/25/18 1214 Resulting lab: [facility]    Specimen Information    Type Source Collected On   Blood  07/25/18 1232          Components       Value Reference Range Flag Lab   WBC 12.2 4.0 - 11.0 10e9/L H FrRdHs   RBC Count 4.56 3.8 - 5.2 10e12/L  FrRdHs   Hemoglobin 14.2 11.7 - 15.7 g/dL  FrRdHs   Hematocrit 44.1 35.0 - 47.0 %  FrRdHs   MCV 97 78 - 100 fl  FrRdHs   MCH 31.1 26.5 - 33.0 pg  FrRdHs   MCHC 32.2 31.5 - 36.5 g/dL  FrRdHs   RDW 13.4 10.0 - 15.0 %  FrRdHs   Platelet Count 188 150 - 450 10e9/L  FrRdHs            NT proBNP inpatient and ED [[id number]] (Abnormal)  Resulted: 07/25/18 1247, Result status: Final result    Ordering provider: Brianda Watkins PA-C  07/25/18 1000 Resulting lab: [facility]    Specimen Information    Type Source Collected On   Blood  07/25/18 1138          Components       Value Reference Range Flag Lab   N-Terminal Pro BNP Inpatient 2610 0 - 1800 pg/mL H FrStHsLb   Comment:            Reference range shown and results flagged as abnormal are suggested  inpatient   cut points for confirming diagnosis if CHF in an acute setting. Establishing a   baseline value for each individual patient is useful for follow-up. An   inpatient or emergency department NT-proPBNP <300 pg/mL effectively rules out   acute CHF, with 99% negative predictive value.  The outpatient non-acute reference range for ruling out CHF is:   0-125 pg/mL (age 18 to less than 75)   0-450 pg/mL (age 75 yrs and older)              Troponin I [[id number]] (Abnormal)  Resulted: 07/25/18 1226, Result status: Final result    Ordering provider: Brianda Watkins PA-C  07/25/18 1000 Resulting lab: [facility]    Specimen Information    Type Source Collected On   Blood  07/25/18 1138          Components       Value Reference Range Flag Lab   Troponin I ES 0.146 0.000 - 0.045 ug/L [facility]   Comment:         The 99th percentile for upper reference range is 0.045 ug/L.  Troponin values   in the range of 0.045 - 0.120 ug/L may be associated with risks of adverse   clinical events.  Consistent with previous critical result              Troponin I (STAT) [[id number]]  Resulted: 07/25/18 1138, Result status: In process    Ordering provider: Oscar Jalloh MD  07/25/18 1055 Resulting lab: [facility]    Specimen Information    Type Source Collected On   Blood  07/25/18 1138            Troponin I [[id number]] (Abnormal)  Resulted: 07/25/18 0522, Result status: Final result    Ordering provider: Alvaro Lemons MD  07/25/18 0408 Resulting lab: [facility]    Specimen Information    Type Source Collected On   Blood  07/25/18 0413          Components       Value Reference Range Flag Lab   Troponin I ES 0.184 0.000 - 0.045 ug/L [facility]   Comment:         The 99th percentile for upper reference range is 0.045 ug/L.  Troponin values   in the range of 0.045 - 0.120 ug/L may be associated with risks of adverse   clinical events.  Critical Value called to and read back by  KALEE ANDREA  0518 07.25.18 JFR              Comprehensive metabolic panel [[id number]] (Abnormal)  Resulted: 07/25/18 0446, Result status: Final result    Ordering provider: Alvaro Lemons MD  07/25/18 0408 Resulting lab: [facility]    Specimen Information    Type Source Collected On   Blood  07/25/18 0413          Components       Value Reference Range Flag Lab   Sodium 135 133 - 144 mmol/L  FrRdHs   Potassium 3.5 3.4 - 5.3 mmol/L  FrRdHs   Chloride 103 94 - 109 mmol/L  FrRdHs   Carbon Dioxide 25 20 - 32 mmol/L  FrRdHs   Anion Gap 7 3 - 14 mmol/L  FrRdHs   Glucose 121 70 - 99 mg/dL H FrRdHs   Urea Nitrogen 17 7 - 30 mg/dL  FrRdHs   Creatinine 1.09 0.52 - 1.04 mg/dL H FrRdHs   GFR Estimate 47 >60 mL/min/1.7m2 L FrRdHs   Comment:  Non  GFR Calc   GFR Estimate If Black 57 >60 mL/min/1.7m2 L FrRdHs   Comment:  African American GFR Calc   Calcium 8.4 8.5 - 10.1 mg/dL L FrRdHs   Bilirubin Total 1.1 0.2 - 1.3 mg/dL  FrRdHs   Albumin 3.0 3.4 - 5.0 g/dL L FrRdHs   Protein Total 7.5 6.8 - 8.8 g/dL  FrRdHs   Alkaline Phosphatase 89 40 - 150 U/L  FrRdHs   ALT 18 0 - 50 U/L  FrRdHs   AST 20 0 - 45 U/L  FrRdHs            Lipase [[id number]]  Resulted: 07/25/18 0446, Result status: Final result    Ordering provider: Alvaro Lemons MD  07/25/18 0408 Resulting lab: [facility]    Specimen Information    Type Source Collected On   Blood  07/25/18 0413          Components       Value Reference Range Flag Lab   Lipase 73 73 - 393 U/L  FrRdHs            Lactic acid whole blood [[id number]]  Resulted: 07/25/18 0430, Result status: In process    Ordering provider: Alvaro Lemons MD  07/25/18 0425 Resulting lab: [facility]    Specimen Information    Type Source Collected On     07/25/18 0425            CBC with platelets differential [[id number]] (Abnormal)  Resulted: 07/25/18 0425, Result status: Final result    Ordering provider: Alvaro Lemons MD  07/25/18 0408 Resulting lab:  [facility]    Specimen Information    Type Source Collected On   Blood  07/25/18 0413          Components       Value Reference Range Flag Lab   WBC 15.7 4.0 - 11.0 10e9/L H FrRdHs   RBC Count 4.71 3.8 - 5.2 10e12/L  FrRdHs   Hemoglobin 14.4 11.7 - 15.7 g/dL  FrRdHs   Hematocrit 44.3 35.0 - 47.0 %  FrRdHs   MCV 94 78 - 100 fl  FrRdHs   MCH 30.6 26.5 - 33.0 pg  FrRdHs   MCHC 32.5 31.5 - 36.5 g/dL  FrRdHs   RDW 13.2 10.0 - 15.0 %  FrRdHs   Platelet Count 199 150 - 450 10e9/L  FrRdHs   Diff Method Automated Method   FrRdHs   % Neutrophils 81.1 %  FrRdHs   % Lymphocytes 8.9 %  FrRdHs   % Monocytes 9.2 %  FrRdHs   % Eosinophils 0.1 %  FrRdHs   % Basophils 0.3 %  FrRdHs   % Immature Granulocytes 0.4 %  FrRdHs   Nucleated RBCs 0 0 /100  FrRdHs   Absolute Neutrophil 12.7 1.6 - 8.3 10e9/L H FrRdHs   Absolute Lymphocytes 1.4 0.8 - 5.3 10e9/L  FrRdHs   Absolute Monocytes 1.4 0.0 - 1.3 10e9/L H FrRdHs   Absolute Eosinophils 0.0 0.0 - 0.7 10e9/L  FrRdHs   Absolute Basophils 0.0 0.0 - 0.2 10e9/L  FrRdHs   Abs Immature Granulocytes 0.1 0 - 0.4 10e9/L  FrRdHs   Absolute Nucleated RBC 0.0   FrRdHs            Testing Performed By     Lab - Abbreviation Name Director Address Valid Date Range    12 - FrRdHs [facility] Unknown [address]  [facility] [address] 05/08/15 1057 - Present    14 - FrStHsLb [facility] Unknown [address] [place] [address] 05/08/15 1057 - Present    45 - QDX661 [facility] Unknown Unknown 01/28/02 0000 - Present    75 - Unknown [facility] Unknown [address] [facility] [address] 01/15/15 1019 - Present    225 - Unknown [facility] Unknown [address] [facility] [address] 12/19/14 0954 - Present    226 - Unknown [facility] Unknown [address] [facility] [address] 12/19/14 0955 - Present            Unresulted Labs     None         Imaging Results - 3 Days      CT Chest  Pulmonary Embolism w Contrast [[id number]]  Resulted: 07/25/18 0702, Result status: Final result    Ordering provider: Alvaro Lemons MD  07/25/18 0537 Resulted by: Paulie Zavaleta MD    Performed: 07/25/18 0621 - 07/25/18 0635 Resulting lab: RADIOLOGY RESULTS    Narrative:       CT CHEST WITH CONTRAST  7/25/2018 6:31 AM     HISTORY: Dyspnea.    COMPARISON: None.    TECHNIQUE: Following the uneventful administration of 68 mL Isovue-370  intravenous contrast, helical sections were acquired through the lungs  according to the pulmonary embolism protocol. Coronal reconstructions  were generated. Radiation dose for this scan was reduced using  automated exposure control, adjustment of the mA and/or kV according  to the patient's size, or iterative reconstruction technique.    FINDINGS: No visualized pulmonary embolus. The thoracic aorta is  normal in caliber without dissection. Atherosclerotic calcification in  the thoracic aorta. Left anterior chest wall cardiac device with lead  tips in the right atrium and right ventricle.    A few linear and band-like opacities in bilateral lung bases, most  likely representing atelectasis and/or scarring. The lungs are  otherwise clear. No pleural or pericardial effusion. No enlarged lymph  nodes in the chest. Heterogeneous thyroid gland.    Scan through the upper abdomen is unremarkable.      Impression:       IMPRESSION:  1. No visualized pulmonary embolus.  2. No evidence of active pulmonary disease.    PAULIE ZAVALETA MD      XR Chest 2 Views [[id number]]  Resulted: 07/25/18 0541, Result status: Final result    Ordering provider: Alvaro Lemons MD  07/25/18 0408 Resulted by: Paulie Zavaleta MD    Performed: 07/25/18 0439 - 07/25/18 0446 Resulting lab: RADIOLOGY RESULTS    Narrative:       CHEST 2 VIEWS  7/25/2018 4:46 AM     HISTORY: Cough. Right-sided chest pain.    COMPARISON: None.    FINDINGS: No convincing pulmonary opacities. Borderline  cardiomegaly.  Atherosclerotic calcification in the thoracic aorta. Left anterior  chest wall cardiac device with lead tips in the right atrium and right  ventricle.      Impression:       IMPRESSION: No convincing evidence of active cardiopulmonary disease.    SATISH HERNANDEZ MD      Testing Performed By     Lab - Abbreviation Name Director Address Valid Date Range    104 - Rad Rslts RADIOLOGY RESULTS Unknown Unknown 05 1553 - Present               ECG/EMG Results      Echocardiogram Complete [[id number]]  Resulted: 18 1120, Result status: Edited Result - FINAL    Ordering provider: Brianda Lowe PA-C  18 1000 Resulted by: Alvaro Paredes MD    Performed: 18 1058 - 18 1251 Resulting lab: RADIOLOGY RESULTS    Narrative:       [id number]  [id number]  [id number]  [id number]^CHRISSY^BRIANDA^S           [facility]  Echocardiography Laboratory  [address] [place] [address]        Name: ADELAIDA HANSON  MRN: [id number]  : 01/15/1931  Study Date: 2018 11:20 AM  Age: 87 yrs  Gender: Female  Patient Location: [facility]  Reason For Study: Chest Pain  Ordering Physician: BRIANDA LOWE  Referring Physician: Zachary Sky  Performed By: Junior Roy RDCS     BSA: 1.9 m2  Height: 65 in  Weight: 181 lb  HR: 55  BP: 134/69 mmHg  _____________________________________________________________________________  __        Procedure  Complete Portable Echo Adult.  _____________________________________________________________________________  __        Interpretation Summary     The left ventricle is normal in structure, function and size.  The left ventricular ejection fraction is normal.  Grade III or advanced diastolic dysfunction.  The right ventricle is normal in structure, function and size.  There is a catheter/pacemaker lead seen in the right ventricle.  There is mild (1+) mitral regurgitation.  There is mild to moderate (1-2+) tricuspid regurgitation.  The right  ventricular systolic pressure is approximated at 30mmHg plus the  right atrial pressure.  The study was technically difficult. There is no comparison study available.  _____________________________________________________________________________  __        Left Ventricle  The left ventricle is normal in structure, function and size. There is normal  left ventricular wall thickness. The left ventricular ejection fraction is  normal. Grade III or advanced diastolic dysfunction. No regional wall motion  abnormalities noted.     Right Ventricle  There is a catheter/pacemaker lead seen in the right ventricle. The right  ventricle is normal in structure, function and size.     Atria  Normal left atrial size. Right atrial size is normal. There is no atrial shunt  seen.     Mitral Valve  There is mild (1+) mitral regurgitation.        Tricuspid Valve  There is mild to moderate (1-2+) tricuspid regurgitation. The right  ventricular systolic pressure is approximated at 30mmHg plus the right atrial  pressure. Dilated IVC (>2.5cm) with <50% respiratory collapse; right atrial  pressure is estimated at 15-20mmHg.     Aortic Valve  There is mild (1+) aortic regurgitation. No aortic stenosis is present.     Pulmonic Valve  There is trace pulmonic valvular regurgitation.     Vessels  Normal size aorta. Dilated inferior vena cava.     Pericardium  There is no pericardial effusion.        Rhythm  The rhythm was undetermined. Likely paced rhythm.  _____________________________________________________________________________  __                                Report approved by: Fredi Benites 07/25/2018 01:05 PM                    _____________________________________________________________________________  __       1    Type Source Collected On     07/25/18 1120          View Image (below)              Encounter-Level Documents:     There are no encounter-level documents.      Order-Level Documents:     There are no  order-level documents.

## 2018-07-25 NOTE — UTILIZATION REVIEW
"[facility]     Admission Status; Secondary Review Determination     Admission Date: 7/25/2018  3:40 AM      Under the authority of the Utilization Management Committee, the utilization review process indicated a secondary review on the above patient.  The review outcome is based on review of the medical records, discussions with staff, and applying clinical experience noted on the date of the review.          (x) Observation Status Appropriate - This patient does not meet hospital inpatient criteria and is placed in observation status. If this patient's primary payer is Medicare and was admitted as an inpatient, Condition Code 44 should be used and patient status changed to \"observation\".     RATIONALE FOR DETERMINATION   87-year-old female with a history of hypertension, dementia, and pacemaker presented with cough and chest pain.  Troponin was elevated and she was initially thought to have non-STEMI so was started on a heparin drip.  Cardiology has been consulted and ordered an echocardiogram but believes her chest pain is atypical and not representative of ACS.  I discussed the case with KOURTNEY Watkins.  The patient otherwise is doing reasonably well and only has complaints of cough not requiring further use of antibiotics or other aggressive measures. At the time of this review, the patient does not meet medical necessity for inpatient hospitalization.     The severity of illness, intensity of service provided, expected LOS and risk for adverse outcome make the care appropriate for further observation; however, doesn't meet criteria for hospital inpatient admission. This was discussed with KOURTNEY Watkins who concurred with this determination.        The information on this document is developed by the utilization review team in order for the business office to ensure compliance.  This only denotes the appropriateness of proper admission status and does not reflect the quality of care rendered.      "    The definitions of Inpatient Status and Observation Status used in making the determination above are those provided in the CMS Coverage Manual, Chapter 1 and Chapter 6, section 70.4.      Sincerely,     Yuri Chatterjee DO MPH   Physician Advisor  Utilization Review  [facility]

## 2018-07-25 NOTE — ED PROVIDER NOTES
History     Chief Complaint:  Cough    HPI   The patient's history is limited due to her dementia.    Leighann Regan is a 87 year old female with a history of dementia and hypertension who presents via EMS with a cough. The patient lives in a nursing home, where staff reports that she has a cough and left-sided chest pain, prompting them to call EMS to transport the patient to the ED for evaluation. Of note, the patient states she does not know why she is in the ED, but that she recently walked in weed killer.    Allergies:  Codeine  Ibuprofen  Latex  Tramadol  Sulfa drugs  Iodinated contrast media- IV dye    Medications:    Aspirin   Metoprolol  Meclizine  Senexon    Past Medical History:    Dementia without behavioral disturbance  Hypertension  Seborrheic dermatitis  Macular degeneration  Chronic constipation    Past Surgical History:    No past surgical history on file.    Family History:    No family history on file.    Social History:  Marital Status:   Smoking status: Not on file  Alcohol use: Not on file     Review of Systems   Unable to perform ROS: Dementia     Physical Exam     Patient Vitals for the past 24 hrs:   BP Temp Temp src Heart Rate Resp SpO2   07/25/18 0700 130/66 - - 98 22 97 %   07/25/18 0645 133/84 - - 92 25 98 %   07/25/18 0615 - - - 94 23 98 %   07/25/18 0600 134/78 - - 88 19 -   07/25/18 0545 143/83 - - 90 24 96 %   07/25/18 0530 (!) 125/92 - - 92 23 97 %   07/25/18 0515 161/74 - - 90 12 96 %   07/25/18 0500 160/76 - - - - -   07/25/18 0441 - - - 91 - 96 %   07/25/18 0344 137/90 99.3  F (37.4  C) Oral 90 - 94 %     Physical Exam  Constitutional:  Appears well-developed and well-nourished. Alert. Conversant, but oriented to person only.  Frequent, dry cough   HENT:   Head: Atraumatic.   Nose: Nose normal.  Mouth/Throat: Oral mucosa is clear and moist. no trismus. Pharynx normal. Tonsils symmetric. No tonsillar enlargement, erythema, or exudate.  Eyes: Conjunctivae normal. EOM  normal. Pupils equal, round, and reactive to light. No scleral icterus.   Neck: Normal range of motion. Neck supple. No tracheal deviation present.   No JVD  Cardiovascular: Normal rate, regular rhythm. No gallop. No friction rub. No murmur heard. Symmetric radial artery pulses   Pulmonary/Chest: Frequent cough, but no distress or retractions.. No stridor. No respiratory distress.  Difficult to appreciate lung sounds due to frequent expiratory cough, but no definite wheezes. No rales. No rhonchi . No tenderness.   Abdominal: Soft. Bowel sounds normal. No distension. No mass. No tenderness. No rebound. No guarding.   Musculoskeletal:   RUE: Normal range of motion. No tenderness. No deformity  LUE: Normal range of motion. No tenderness. No deformity  RLE: Normal range of motion. No edema. No tenderness. No deformity  LLE: Normal range of motion. No edema. No tenderness. No deformity  Lymph: No cervical adenopathy.   Neurological: Alert and oriented to person, place, and time. Normal strength. CN II-VII intact. No sensory deficit. GCS eye subscore is 4. GCS verbal subscore is 5. GCS motor subscore is 6. Normal coordination   Skin: Skin is warm and dry. No rash noted. No pallor. Normal capillary refill.  Psychiatric:  Normal mood. Normal affect.   Emergency Department Course   ECG (3:46:29):  Rate 92 bpm. AR interval 272. QRS duration 150. QT/QTc 410/507. P-R-T axes * 102 -35. Atrial-sensed ventricular-paced rhythm with prolonged AV conduction. Abnormal ECG. Interpreted at 0349 by Alvaro Lemons MD.    Imaging:  Radiographic findings were communicated with the patient who voiced understanding of the findings.    XR Chest 2 Views:  No convincing evidence of active cardiopulmonary disease.  As read by Radiology.    CT Chest Pulmonary Embolism w Contrast:  1. No visualized pulmonary embolus.  2. No evidence of active pulmonary disease.  As read by Radiology.    Laboratory:  0425: ISTAT Gases Lactate cathleen POCT: pH  "7.45 (H), PCO2 38 (L), PO2 34, Bicarbonate 26, O2 Sat 69  CBC: WBC 15.7 (H), o/w WNL (HGB 14.4, )  CMP: Glucose 121 (H), Creatinine 1.09 (H), GFR Estimate 47 (L), Calcium 8.4 (L), Albumin 3.0 (L), o/w WNL  Lipase: 73  Troponin: 0.184 (HH)    Blood Cultures: in process    Interventions:  0419: Duoneb, 3 mL, nebulization   0420: NS 1L IV Bolus  0458: Rocephin 1 g IV  0502: Duoneb, 3 mL, nebulization   0515: Azithromycin 500 mg IV  0553: Solu-Medrol 125 mg IV  0554: Aspirin 324 mg PO  0559: Albuterol, 2.5 mL, nebulization     Emergency Department Course:  The patient arrived in the emergency department via EMS.    Past medical records, nursing notes, and vitals reviewed.  0402: I performed an exam of the patient and obtained history, as documented above.  EKG was obtained, results above.  IV inserted and blood drawn.  The patient was sent for a chest X-ray while in the emergency department, findings above.    0537: I rechecked the patient. Explained findings to the patient.  The patient was sent for a chest CT while in the emergency department, findings above.    Findings and plan explained to the patient, who has dementia and cannot consent to admission.  She is agreeable to our plan however.  I attempted to contact her family member list on her pulse form, without success.  She has \"selective cares\" checked on her post form.  At this point since she was transported to the ER from her memory care unit I would assume they would want hospitalization and monitoring, but will hold off on any invasive medications or procedures until family can provide input.    0731: I spoke with the patient's daughter regarding the patient.    0739: I spoke to Dr. Chavira of the hospitalist service who accepts the patient for admission.     0802: I rechecked the patient daughters Explained findings to the patient and her daughter.  After 7 AM her daughters arrived here in the ER.  They can provide any additional HPI.  They do " "provide some clarity about her goals of care.  She would be DNR/DNI.  They do would want to investigate possible ACS and might even consider angiogram.  They would agree with plans for aspirin and heparin and further workup.      Discussed the patient with yarelis hernandez, who will admit the patient to a tele bed for further monitoring, evaluation, and treatment.     Impression & Plan    Medical Decision Making:  Leighann Regan is a 87 year old female with dementia brought in by EMS from memory care unit for evaluation of cough and chest pain.  History is very limited because of the patient's dementia.  She can endorse that she is having pain on the right side of her chest, and by her clinical appearance it seems to be pleuritic.  Otherwise no precise history is obtainable.  She is apparently had a cough and then developed chest pain overnight tonight.  We were unable to even get any records from her primary care unit, or any advance directives, or any med list, because apparently all those records were in a locked room and could not be sent along with the ambulance that brought her in.  With effort we were able to identify her memory care unit- [facility] [place]-and they were able to fax us her post form and a med list several hours after she arrived.  It turns out that she is DNR/DNI but would want \"selective treatment\".  I see that her pulsed form was signed by a family member Rachele Ndiaye and try to contact that them number at her listed phone number.  I was unable to contact her, but I left a message .  At this point we have to assume she would want medical treatment but will not initiate CPR should she go into cardiac arrest.    With her cough and chest pain we were initially concerned about possible pneumonia.  We started the patient on empiric antibiotics and sent blood cultures.  Chest x-ray ultimately came back negative.  With her pleuritic appearing chest pain we did consider PE.  Fortunately " CTPA is negative.  After we obtain the CT scan we obtained a fax copy of her meds and allergies and she is listed to be allergic to IV contrast dye.  She has had no sign of allergic reaction while here in the ER.  The patient was frequently coughing during her serial lung exams.  I felt that she was having so much difficulty breathing out there might be some bronchospasm for her.  We did administer nebs and corticosteroids, without much improvement.  After we obtained fax copies of her med list, we see there is no clear history of COPD or asthma and no other bronchodilators her controller meds on her list.  Blood gas shows no evidence for CO2 retention or respiratory acidosis    The patient's EKG is difficult to interpret because she has a pace maker. Shows no obvious ST elevation by [place] criteria.  Troponin is elevated at 0.18.  Aspirin given.  Initially unclear what her goals of care were.  After discussion with her family they would like to investigate for ACS to see if this is truly a significant troponin or not and to see if there is any serial rise.  We will had a heparin.  Patient is not really reporting ongoing ischemic sounding pain right now.    Diagnosis:    ICD-10-CM   1. Acute chest pain R07.9   2. Troponin level elevated R74.8       Disposition:  Admitted to [facility] accepting for Dr. Fan Reyes  7/25/2018   [facility] EMERGENCY DEPARTMENT  Bhavya KAPADIA, am serving as a scribe at 4:02 AM on 7/25/2018 to document services personally performed by Alvaro Lemons MD based on my observations and the provider's statements to me.      Alvaro Lemons MD  07/25/18 3061

## 2018-07-25 NOTE — IP AVS SNAPSHOT
` `     [facility] [place] 3 MEDICAL SURGICAL: [phone]                 INTERAGENCY TRANSFER FORM - NOTES (H&P, Discharge Summary, Consults, Procedures, Therapies)   2018                    Hospital Admission Date: 2018  ADELAIDA REGAN   : 1/15/1931  Sex: Female        Patient PCP Information     Provider PCP Type    Zachary Sky MD General         History & Physicals      H&P by Brianda Watkins PA-C at 2018  7:45 AM     Author:  Brianda Watkins PA-C Service:  Internal Medicine Author Type:  Physician Assistant - C    Filed:  2018 11:31 AM Date of Service:  2018  7:45 AM Creation Time:  2018  7:45 AM    Status:  Attested :  Brianda Watkins PA-C (Physician Assistant - C)    Cosigner:  Abhishek Chavira MD at 2018 11:51 AM        Attestation signed by Abhishek Chavira MD at 2018 11:51 AM        Physician Attestation   IAbhishek, saw and evaluated Adelaida Regan as part of a shared visit.  I have reviewed and discussed with the advanced practice provider their history, physical and plan.    I personally reviewed the vital signs, medications, labs and imaging.    My key history or physical exam findings: + ttp on chest wall.    Key management decisions made by me: trend troponin ,TTE    Abhishek Chavira  Date of Service (when I saw the patient): 18                               [facility] Internal Medicine  History and Physical      Patient Name: Adelaida Regan MRN# [id number]   Age: 87 year old YOB: 1931     Date of Admission:2018    Primary care provider: No primary care provider on file.  Date of Service: 2018         Assessment and Plan:   Adelaida Regan is a 87 year old female with[EM1.1] a history of HTN, PPM, Dementia who presents from her DASIA 2/2 cough and chest pain.[EM1.2]    ED work up revealed patient normotensive, tachycardic 90s, temp 99.3, 94% on room air.  Laboratory work up revealed[EM1.1]  Troponin 0.184, creatinine 1.09, wbc 15.7[EM1.3] with otherwise normal[EM1.1] CMP, Lipase, Lactic Acid, CBC[EM1.3].[EM1.1]  CXR[EM1.3] revealed[EM1.1] no acute abnormalities[EM1.3].[EM1.1]  CT chest was negative.[EM1.3]   Patient received[EM1.1] IVF, ASA, Zithromax, Ceftriaxone, Duoneb, IV Solumedrol, Albuterol[EM1.3] and admitted for further management.[EM1.1]    Acute Chest Pain, Elevated Troponin - acute onset of right sided chest pain earlier today.[EM1.2]  No hx of any trauma.[EM1.4]  Patient complains of cough and chest pain which sounds pleuritic in nature, however troponin elevated to 0.184[EM1.2] concerning for possible ACS[EM1.5].  CXR and CT chest negative for acute abnormality.  Patient received ASA and started on a heparin infusion in the ED.  - serial troponin levels and telemetry monitoring  - check BNP  - continue heparin infusion, ASA and Betablocker  - 2d echocardiogram ordered  - Cardiology consulted.    Cough, Leukocytosis - mild cough noted by family one week ago, now with dry cough on exam and few scattered wheezes.  No hx of Asthma, COPD or Tobacco Abuse.  Imaging negative for Acute Pneumonia.  WBC elevated at 15.7 and stable on room air.  Possible viral process[EM1.2] may be contributing to above chest pain[EM1.4].  - continue scheduled duonebs  - check procalcitonin, blood cultures and UA.  Hold further antibiotics for now    HTN - bp stable.    - continue home Metoprolol.    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    - interrogate ppm     Dementia - diagnosed around 3 years ago.  Oriented to person only at baseline.  Not currently on medications.[EM1.2]       CODE:[EM1.1] Full - discussed with POA at the bedside.  Has a POLST which states DNR, but family reports they would like resuscitation and short trial of intubation if required, but not prolonged life on a ventilator.[EM1.2]  Diet/IVF:[EM1.1] regular, NS[EM1.2]  GI ppx:[EM1.1]  pepcid[EM1.2]  DVT ppx:[EM1.1] on  heparin[EM1.2]    Patient discussed with [EM1.1] Fan[EM1.2]    Brianda Watkins MS, PA-C  Physician Assistant   Hospitalist Service  Pager: [phone]           Chief Complaint:[EM1.1]   Chest Pain, Cough[EM1.2]         HPI:   87 year old female with[EM1.1] a history of HTN, PPM, Dementia who presents from her DASIA 2/2 cough and chest pain.    Patient has dementia and is oriented to person only and is currently at baseline.  Family at the bedside reports she had a mild cough last week.  Today, her skilled nursing called the POA in the middle of the night as patient reported chest pain and a cough.  EMS was called.  Patient complains of right sided chest pain worse with coughing.  Family denies hx of any lung disease, tobacco, heart disease.  Patient is otherwise without complaints, but ROS limited 2/2 hx of dementia.[EM1.2]           Past Medical History:[EM1.1]     Past Medical History:   Diagnosis Date     Constipation      Dementia      HTN (hypertension)      Macular degeneration      Pacemaker      Seborrheic dermatitis[EM1.6]           Past Surgical History:[EM1.1]     Past Surgical History:   Procedure Laterality Date     HYSTERECTOMY[EM1.6]            Social History:[EM1.1]     Social History     Social History     Marital status:      Spouse name: N/A     Number of children: 7     Years of education: N/A     Occupational History     retired      Social History Main Topics     Smoking status: Never Smoker     Smokeless tobacco: Not on file     Alcohol use No     Drug use: No     Sexual activity: Not on file     Other Topics Concern     Not on file     Social History Narrative     No narrative on file[EM1.6]          Family History:[EM1.1]     Family History   Problem Relation Age of Onset     Cerebrovascular Disease Mother      Diabetes Mother[EM1.6]           Allergies:      Allergies   Allergen Reactions     Codeine           Medications:     Prior to Admission medications    Not on File          Review of  Systems:   A complete ROS was performed and is negative other than what is stated in the HPI.       Physical Exam:   Blood pressure 130/66, temperature 99.3  F (37.4  C), temperature source Oral, resp. rate 22, SpO2 97 %.[EM1.1] on room air[EM1.2]  General: Alert, interactive, NAD[EM1.1], sitting up in bed with daughters at the bedside, frequent dry cough.  Hard of hearing.[EM1.2]  HEENT: AT/NC, sclera anicteric, PERRL, EOMI  Chest/Resp:[EM1.1] few coarse breath sounds bilaterally, few upper airway wheezes[EM1.2]  Heart/CV: regular rate and rhythm, no murmur  Abdomen/GI: Soft, nontender, nondistended. +BS.  No rebound or guarding.  Extremities/MSK:[EM1.1] Trace B[EM1.2]LE edema  Skin: Warm and dry  Neuro: Alert & oriented[EM1.1] to person only.  Not to time or place. N[EM1.2]o focal deficits, moves all extremities equally         Labs:   ROUTINE ICU LABS (Last four results)  CMP  Recent Labs  Lab 07/25/18  0413      POTASSIUM 3.5   CHLORIDE 103   CO2 25   ANIONGAP 7   *   BUN 17   CR 1.09*   GFRESTIMATED 47*   GFRESTBLACK 57*   ALESHA 8.4*   PROTTOTAL 7.5   ALBUMIN 3.0*   BILITOTAL 1.1   ALKPHOS 89   AST 20   ALT 18     CBC  Recent Labs  Lab 07/25/18  0413   WBC 15.7*   RBC 4.71   HGB 14.4   HCT 44.3   MCV 94   MCH 30.6   MCHC 32.5   RDW 13.2        INRNo lab results found in last 7 days.  Arterial Blood GasNo lab results found in last 7 days.       Imaging/Procedures:     Results for orders placed or performed during the hospital encounter of 07/25/18   XR Chest 2 Views    Narrative    CHEST 2 VIEWS  7/25/2018 4:46 AM     HISTORY: Cough. Right-sided chest pain.    COMPARISON: None.    FINDINGS: No convincing pulmonary opacities. Borderline cardiomegaly.  Atherosclerotic calcification in the thoracic aorta. Left anterior  chest wall cardiac device with lead tips in the right atrium and right  ventricle.      Impression    IMPRESSION: No convincing evidence of active cardiopulmonary  disease.    SATISH HERNANDEZ MD   CT Chest Pulmonary Embolism w Contrast    Narrative    CT CHEST WITH CONTRAST  7/25/2018 6:31 AM     HISTORY: Dyspnea.    COMPARISON: None.    TECHNIQUE: Following the uneventful administration of 68 mL Isovue-370  intravenous contrast, helical sections were acquired through the lungs  according to the pulmonary embolism protocol. Coronal reconstructions  were generated. Radiation dose for this scan was reduced using  automated exposure control, adjustment of the mA and/or kV according  to the patient's size, or iterative reconstruction technique.    FINDINGS: No visualized pulmonary embolus. The thoracic aorta is  normal in caliber without dissection. Atherosclerotic calcification in  the thoracic aorta. Left anterior chest wall cardiac device with lead  tips in the right atrium and right ventricle.    A few linear and band-like opacities in bilateral lung bases, most  likely representing atelectasis and/or scarring. The lungs are  otherwise clear. No pleural or pericardial effusion. No enlarged lymph  nodes in the chest. Heterogeneous thyroid gland.    Scan through the upper abdomen is unremarkable.      Impression    IMPRESSION:  1. No visualized pulmonary embolus.  2. No evidence of active pulmonary disease.    SATISH HERNANDEZ MD[EM1.1]            Revision History        User Key Date/Time User Provider Type Action    > EM1.4 7/25/2018 11:31 AM Molenaar, Brianda S, PA-C Physician Assistant - C Sign     [N/A] 7/25/2018  9:07 AM Molenaar, Brianda S, PA-C Physician Assistant - C Sign     EM1.5 7/25/2018  9:07 AM Molenaar, Brianda S, PA-C Physician Assistant - C Sign     EM1.6 7/25/2018  8:58 AM Molenaar, Brianda S, PA-C Physician Assistant - C      EM1.2 7/25/2018  8:44 AM Molenaar, Brianda S, PA-C Physician Assistant - C      EM1.3 7/25/2018  8:04 AM Molenaar, Brianda S, PA-C Physician Assistant - C      EM1.1 7/25/2018  7:45 AM Molenaar, Brianda S, PA-C Physician Assistant - C                    Discharge Summaries     No notes of this type exist for this encounter.         Consult Notes      Consults by Oscar Jalloh MD at 7/25/2018 11:16 AM     Author:  Oscar Jalloh MD Service:  Cardiology Author Type:  Physician    Filed:  7/25/2018 11:16 AM Date of Service:  7/25/2018 11:16 AM Creation Time:  7/25/2018 11:09 AM    Status:  Signed :  Oscar Jalloh MD (Physician)     Consult Orders:    1. Cardiology IP Consult: Patient to be seen: Routine - within 24 hours; chest pain, elevated troponin; Consultant may enter orders: Yes [[id number]] ordered by Brianda Watkins PA-C at 07/25/18 0906                [facility]    Cardiology Consultation     Date of Admission:  7/25/2018    Assessment & Plan     The patient is a pleasant 87-year-old female with a history of bradycardia status post permanent pacemaker implantation, essential hypertension, and dementia who is admitted with atypical chest pain and found to have an initial elevated troponin of 0.184.  The patient's chest pain is not cardiac in origin as it is nearly continuous and worsened with deep inspiration.  I reviewed her CT scan and did not see coronary artery calcifications.  At this time I would repeat a stat troponin and complete her transthoracic echocardiogram.  In the interim I have added a low-dose statin to her medical regimen.  If her troponin does not rise significantly or decreases I would discontinue her IV unfractionated heparin.  Further recommendations will be based on the results of the transthoracic echocardiogram and repeat troponin.  I will defer to the internal medicine team evaluation of her noncardiac pleuritic chest pain.    Oscar Jlaloh MD    Primary Care Physician   Zachary Sky    Reason for Consult   Reason for consult: I was asked by internal medicine to evaluate this patient for chest pain.    History of Present Illness   Leighann Regan is a 87 year old female who presents with right-sided  chest pain which is worsened by deep inspiration.  The patient has a history of permanent pacemaker implantation in [facility] in 2006, essential hypertension and dementia.  Much of the history is obtained from the chart and daughter.  The patient is on sure when her chest pain began but states it hasn't is continuous.  She denies any other associated chest tightness or heaviness.  The pain on the right side of her chest is worsened when she takes in a deep inspiration.  A CT scan was performed showing no pulmonary abnormalities all of the patient does have an elevated white count.  I did review the CT myself and there is no evidence for coronary artery calcification.  Her initial troponin has come back elevated at 0.184.  Her ECG shows a ventricularly paced rhythm.  A transthoracic echogram is currently pending.    Patient Active Problem List   Diagnosis     Elevated troponin       Past Medical History   I have reviewed this patient's medical history and updated it with pertinent information if needed.   Past Medical History:   Diagnosis Date     Constipation      Dementia      HTN (hypertension)      Macular degeneration      Pacemaker      Seborrheic dermatitis        Past Surgical History   I have reviewed this patient's surgical history and updated it with pertinent information if needed.  Past Surgical History:   Procedure Laterality Date     HYSTERECTOMY         Prior to Admission Medications   Prior to Admission Medications   Prescriptions Last Dose Informant Patient Reported? Taking?   Calcium Carb-Cholecalciferol 600-800 MG-UNIT TABS 7/24/2018 at Unknown time  Yes Yes   Sig: Take 1 tablet by mouth daily   Multiple Vitamins-Minerals (PRESERVISION AREDS 2) CAPS 7/24/2018 at Unknown time  Yes Yes   Sig: Take 1 capsule by mouth daily   acetaminophen (TYLENOL) 325 MG tablet  at prn  Yes Yes   Sig: Take 650 mg by mouth every 8 hours as needed for mild pain   aspirin 81 MG chewable tablet 7/24/2018 at  Unknown time  Yes Yes   Sig: Take 81 mg by mouth daily   meclizine (ANTIVERT) 25 MG tablet  at prn  Yes Yes   Sig: Take 25 mg by mouth 3 times daily as needed for dizziness   metoprolol succinate (TOPROL-XL) 50 MG 24 hr tablet 7/24/2018 at clarifying which bb with NH  Yes Yes   Sig: Take 50 mg by mouth daily   mupirocin (BACTROBAN) 2 % cream  at prn  Yes Yes   Sig: Apply topically 2 times daily as needed Apply to scalp   senna-docusate (SENOKOT-S;PERICOLACE) 8.6-50 MG per tablet  at prn  Yes Yes   Sig: Take 1 tablet by mouth daily as needed for constipation      Facility-Administered Medications: None     Current Facility-Administered Medications   Medication Dose Route Frequency     [START ON 7/26/2018] aspirin  81 mg Oral Daily     atorvastatin  10 mg Oral QPM     azithromycin  500 mg Intravenous Q24H     famotidine  20 mg Oral BID     ipratropium - albuterol 0.5 mg/2.5 mg/3 mL  3 mL Nebulization 4x daily     ipratropium - albuterol 0.5 mg/2.5 mg/3 mL  3 mL Nebulization Q4H     metoprolol succinate  50 mg Oral Daily     morphine (PF)  2 mg Intravenous Once     sodium chloride (PF)  3 mL Intracatheter Q8H     Current Facility-Administered Medications   Medication Last Rate     HEParin 800 Units/hr (07/25/18 0922)     - MEDICATION INSTRUCTIONS -       sodium chloride 75 mL/hr at 07/25/18 1014     Allergies   Allergies   Allergen Reactions     Codeine        Social History    reports that she has never smoked. She does not have any smokeless tobacco history on file. She reports that she does not drink alcohol or use illicit drugs.    Family History   Family History   Problem Relation Age of Onset     Cerebrovascular Disease Mother      Diabetes Mother        Review of Systems   The comprehensive 10 point Review of Systems is negative other than noted in the HPI or here.     Physical Exam   Vital Signs with Ranges  Temp:  [99.3  F (37.4  C)] 99.3  F (37.4  C)  Pulse:  [65] 65  Heart Rate:  [] 92  Resp:  [8-27]  "24  BP: (125-161)/() 144/60  SpO2:  [92 %-98 %] 92 %  Wt Readings from Last 4 Encounters:   07/25/18 82.1 kg (181 lb)            Vitals: /60 (BP Location: Left arm)  Pulse 65  Temp 99.3  F (37.4  C) (Oral)  Resp 24  Ht 1.651 m (5' 5\")  Wt 82.1 kg (181 lb)  SpO2 92%  BMI 30.12 kg/m2    Elderly female who appears stated age in no acute distress  No JVD  No scleral icterus  Heart is distant but regular  Lungs are clear  Abdomen is soft and nontender  Lower extremities edema  She is alert but only oriented to herself  Musculoskeletal exam does not reveal any gross M normalities of her major joints  Dermatologic exam does not reveal any significant rashes or ecchymoses unexposed areas of skin      Recent Labs  Lab 07/25/18  0413   TROPI 0.184*         Recent Labs  Lab 07/25/18  0413   WBC 15.7*   HGB 14.4   MCV 94         POTASSIUM 3.5   CHLORIDE 103   CO2 25   BUN 17   CR 1.09*   GFRESTIMATED 47*   GFRESTBLACK 57*   ANIONGAP 7   ALESHA 8.4*   *   ALBUMIN 3.0*   PROTTOTAL 7.5   BILITOTAL 1.1   ALKPHOS 89   ALT 18   AST 20   LIPASE 73   TROPI 0.184*     No results for input(s): CHOL, HDL, LDL, TRIG, CHOLHDLRATIO in the last 62610 hours.    Recent Labs  Lab 07/25/18  0413   WBC 15.7*   HGB 14.4   HCT 44.3   MCV 94          Recent Labs  Lab 07/25/18  0425   PHV 7.45*   PO2V 34   PCO2V 38*   HCO3V 26     No results for input(s): NTBNPI, NTBNP in the last 168 hours.  No results for input(s): DD in the last 168 hours.  No results for input(s): SED, CRP in the last 168 hours.    Recent Labs  Lab 07/25/18  0413        No results for input(s): TSH in the last 168 hours.  No results for input(s): COLOR, APPEARANCE, URINEGLC, URINEBILI, URINEKETONE, SG, UBLD, URINEPH, PROTEIN, UROBILINOGEN, NITRITE, LEUKEST, RBCU, WBCU in the last 168 hours.    Imaging:  Recent Results (from the past 48 hour(s))   XR Chest 2 Views    Narrative    CHEST 2 VIEWS  7/25/2018 4:46 AM     HISTORY: Cough. " Right-sided chest pain.    COMPARISON: None.    FINDINGS: No convincing pulmonary opacities. Borderline cardiomegaly.  Atherosclerotic calcification in the thoracic aorta. Left anterior  chest wall cardiac device with lead tips in the right atrium and right  ventricle.      Impression    IMPRESSION: No convincing evidence of active cardiopulmonary disease.    SATISH HERNANDEZ MD   CT Chest Pulmonary Embolism w Contrast    Narrative    CT CHEST WITH CONTRAST  7/25/2018 6:31 AM     HISTORY: Dyspnea.    COMPARISON: None.    TECHNIQUE: Following the uneventful administration of 68 mL Isovue-370  intravenous contrast, helical sections were acquired through the lungs  according to the pulmonary embolism protocol. Coronal reconstructions  were generated. Radiation dose for this scan was reduced using  automated exposure control, adjustment of the mA and/or kV according  to the patient's size, or iterative reconstruction technique.    FINDINGS: No visualized pulmonary embolus. The thoracic aorta is  normal in caliber without dissection. Atherosclerotic calcification in  the thoracic aorta. Left anterior chest wall cardiac device with lead  tips in the right atrium and right ventricle.    A few linear and band-like opacities in bilateral lung bases, most  likely representing atelectasis and/or scarring. The lungs are  otherwise clear. No pleural or pericardial effusion. No enlarged lymph  nodes in the chest. Heterogeneous thyroid gland.    Scan through the upper abdomen is unremarkable.      Impression    IMPRESSION:  1. No visualized pulmonary embolus.  2. No evidence of active pulmonary disease.    SATISH HERNANDEZ MD       Echo:  No results found for this or any previous visit (from the past 4320 hour(s)).[RD1.1]                  Revision History        User Key Date/Time User Provider Type Action    > RD1.1 7/25/2018 11:16 AM Oscar Jalloh MD Physician Sign                     Progress Notes - Physician (Notes from  07/23/18 through 07/26/18)      ED Notes by Radha Mills MD at 7/25/2018  9:42 AM     Author:  Radha Mills MD Service:  (none) Author Type:  Physician    Filed:  7/26/2018  1:48 PM Date of Service:  7/25/2018  9:42 AM Creation Time:  7/25/2018  9:42 AM    Status:  Signed :  Radha Mills MD (Physician)         Signed out by Dr Lemons at change of shift.  In brief, patient has history of dementia and presented with cough and chest pain.  Work-up included negative CT PA, positive troponin, paced EKG.  Patient is on heparin.  DNR/DNI but family interested in other interventions including possible angiogram if indicated medically.[CG1.1]    No further needs in ED prior to admission.[CG1.2]           Radha Mills MD  07/26/18 1348  [CG1.3]     Revision History        User Key Date/Time User Provider Type Action    > CG1.3 7/26/2018  1:48 PM Radha Mills MD Physician Sign     CG1.2 7/26/2018  1:47 PM Radha Mills MD Physician      CG1.1 7/25/2018  9:43 AM Radha Mills MD Physician Share            ED Provider Notes by Alvaro Lemons MD at 7/25/2018  3:39 AM     Author:  Alvaro Lemons MD Service:  Emergency Medicine Author Type:  Physician    Filed:  7/25/2018  5:28 PM Date of Service:  7/25/2018  3:39 AM Creation Time:  7/25/2018  3:52 AM    Status:  Signed :  Alvaro Lemons MD (Physician)           History     Chief Complaint:[SP1.1]  Cough[SP1.2]    HPI[SP1.1]   The patient's history is limited due to her dementia.[SP1.2]    Leighann Regan is a 87 year old female[SP1.1] with a history of dementia[SP1.2] and[SP1.3] hypertension who[SP1.4] presents[SP1.1] via EMS[SP1.5] with[SP1.1] a cough. The patient lives in a nursing home, where staff reports that she has a cough and left-sided chest pain, prompting them to call EMS to transport the patient to the ED for evaluation.[SP1.2] Of note, the patient  states she does not know why she is in the ED[SP1.6], but[SP1.7] that she[SP1.6] recently[SP1.7] walked in weed killer.[SP1.6]    Allergies:[SP1.1]  Codeine[SP1.5]  Ibuprofen  Latex  Tramadol  Sulfa drugs  Iodinated contrast media- IV dye[SP1.3]    Medications:[SP1.1]    Aspirin   Metoprolol  Meclizine  Senexon[SP1.3]    Past Medical History:[SP1.1]    Dementia[SP1.2] without behavioral disturbance  Hypertension  Seborrheic dermatitis  Macular degeneration  Chronic constipation[SP1.3]    Past Surgical History:[SP1.1]    No past surgical history on file.[SP1.2]    Family History:[SP1.1]    No family history on file.[SP1.2]    Social History:[SP1.1]  Marital Status: [SP1.3]  Smoking status: Not on file  Alcohol use: Not on[SP1.2] file[SP1.6]     Review of Systems[SP1.1]   Unable to perform ROS[SP1.2]:[SP1.1] Dementia[SP1.2]     Physical Exam[SP1.1]     Patient Vitals for the past 24 hrs:   BP Temp Temp src Heart Rate Resp SpO2   07/25/18 0700 130/66 - - 98 22 97 %   07/25/18 0645 133/84 - - 92 25 98 %   07/25/18 0615 - - - 94 23 98 %   07/25/18 0600 134/78 - - 88 19 -   07/25/18 0545 143/83 - - 90 24 96 %   07/25/18 0530 (!) 125/92 - - 92 23 97 %   07/25/18 0515 161/74 - - 90 12 96 %   07/25/18 0500 160/76 - - - - -   07/25/18 0441 - - - 91 - 96 %   07/25/18 0344 137/90 99.3  F (37.4  C) Oral 90 - 94 %[SP1.8]     Physical Exam[SP1.1]  Constitutional:  Appears well-developed and well-nourished. Alert. Conversant, but oriented to person only.  Frequent, dry cough   HENT:   Head: Atraumatic.   Nose: Nose normal.  Mouth/Throat: Oral mucosa is clear and moist. no trismus. Pharynx normal. Tonsils symmetric. No tonsillar enlargement, erythema, or exudate.  Eyes: Conjunctivae normal. EOM normal. Pupils equal, round, and reactive to light. No scleral icterus.   Neck: Normal range of motion. Neck supple. No tracheal deviation present.   No JVD  Cardiovascular: Normal rate, regular rhythm. No gallop. No friction rub. No  murmur heard. Symmetric radial artery pulses   Pulmonary/Chest: Frequent cough, but no distress or retractions.. No stridor. No respiratory distress.  Difficult to appreciate lung sounds due to frequent expiratory cough, but no definite wheezes. No rales. No rhonchi . No tenderness.   Abdominal: Soft. Bowel sounds normal. No distension. No mass. No tenderness. No rebound. No guarding.   Musculoskeletal:   RUE: Normal range of motion. No tenderness. No deformity  LUE: Normal range of motion. No tenderness. No deformity  RLE: Normal range of motion. No edema. No tenderness. No deformity  LLE: Normal range of motion. No edema. No tenderness. No deformity  Lymph: No cervical adenopathy.   Neurological: Alert and oriented to person, place, and time. Normal strength. CN II-VII intact. No sensory deficit. GCS eye subscore is 4. GCS verbal subscore is 5. GCS motor subscore is 6. Normal coordination   Skin: Skin is warm and dry. No rash noted. No pallor. Normal capillary refill.  Psychiatric:  Normal mood. Normal affect.[MP1.1]   Emergency Department Course   ECG (3:46:29):  Rate 92 bpm. VA interval 272. QRS duration 150. QT/QTc 410/507. P-R-T axes * 102 -35. Atrial-sensed ventricular-paced rhythm with prolonged AV conduction. Abnormal ECG. Interpreted at 0349 by Alvaro Lemons MD.    Imaging:[SP1.1]  Radiographic findings were communicated with the[SP1.6] patient[SP1.9] who voiced understanding of the findings.    XR Chest 2 Views:[SP1.6]  No convincing evidence of active cardiopulmonary disease.[SP1.10]  As read by Radiology.[SP1.6]    CT Chest Pulmonary Embolism w Contrast:[SP1.11]  1. No visualized pulmonary embolus.  2. No evidence of active pulmonary disease.[MP1.1]  As read by Radiology.[SP1.11]    Laboratory:[SP1.1]  0425: ISTAT Gases Lactate cathleen POCT: pH 7.45 (H), PCO2 38 (L), PO2 34, Bicarbonate 26, O2 Sat 69[SP1.12]  CBC:[SP1.6] WBC 15.7 (H), o/w[SP1.12] WNL (HGB[SP1.6] 14.4[SP1.12], PLT[SP1.6]  "199[SP1.12])  CMP:[SP1.6] Glucose 121 (H),[SP1.10] Creatinine[SP1.6] 1.09 (H), GFR Estimate 47 (L), Calcium 8.4 (L), Albumin 3.0 (L), o/w WNL[SP1.10]  Lipase:[SP1.6] 73[SP1.10]  Troponin:[SP1.6] 0.184 (HH)[SP1.10]    Blood Cultures: in process[SP1.6]    Interventions:[SP1.1]  0419: Duoneb, 3 mL, nebulization   0420: NS 1L IV Bolus[SP1.13]  0458: Rocephin 1 g IV  0502: Duoneb, 3 mL, nebulization   0515: Azithromycin 50[SP1.10]0[SP1.14] mg IV[SP1.10]  0553: Solu-Medrol 125 mg IV[SP1.15]  0554: Aspirin 324 mg PO[SP1.16]  0559: Albuterol, 2.5 mL, nebulization[SP1.14]     Emergency Department Course:[SP1.1]  The patient arrived in the emergency department via EMS.[SP1.5]    Past medical records, nursing notes, and vitals reviewed.[SP1.1]  0402[SP1.2]: I performed an exam of the patient and obtained history, as documented above.  EKG was obtained, results above.[SP1.1]  IV inserted and blood drawn.  The patient was sent for a chest X-ray while in the emergency department, findings above.[SP1.6]    0537[SP1.17]: I rechecked the patient. Explained findings to[SP1.1] the patient[SP1.17].[SP1.1]  The patient was sent for a chest CT while in the emergency department, findings above.[SP1.11]    Findings and plan explained to the[SP1.15] patient[SP1.7], who has dementia and cannot[MP1.1] consent to admission.[SP1.15]  She is agreeable to our plan however.  I attempted to contact her family member list on her pulse form, without success.  She has \"selective cares\" checked on her post form.  At this point since she was transported to the ER from her memory care unit I would assume they would want hospitalization and monitoring, but will hold off on any invasive medications or procedures until family can provide input.[MP1.1]    1431[SP1.3]: I spoke with the patient's daughter regarding the patient.[SP1.18]    4539[SP1.19]: I spoke to Dr. Hernandez[SP1.20]a[SP1.4] of the hospitalist service who accepts the patient for " "admission.[SP1.20]     0802: I rechecked the patient[SP1.21] daughters[MP1.2] Explained findings to the patient and her daughter.[SP1.21]  After 7 AM her daughters arrived here in the ER.  They can provide any additional HPI.  They do provide some clarity about her goals of care.  She would be DNR/DNI.  They do would want to investigate possible ACS and might even consider angiogram.  They would agree with plans for aspirin and heparin and further workup.[MP1.2]      Discussed the patient with[SP1.15] yarelis hernandez[MP1.2], who will admit the patient to a[SP1.15] tele[MP1.2] bed for further monitoring, evaluation, and treatment.[SP1.15]     Impression & Plan    Medical Decision Making:[SP1.1]  Leighann Regan is a 87 year old female with dementia brought in by EMS from memory care unit for evaluation of cough and chest pain.  History is very limited because of the patient's dementia.  She can endorse that she is having pain on the right side of her chest, and by her clinical appearance it seems to be pleuritic.  Otherwise no precise history is obtainable.  She is apparently had a cough and then developed chest pain overnight tonight.  We were unable to even get any records from her primary care unit, or any advance directives, or any med list, because apparently all those records were in a locked room and could not be sent along with the ambulance that brought her in.  With effort we were able to identify her memory care unit- [facility] [place]-and they were able to fax us her post form and a med list several hours after she arrived.  It turns out that she is DNR/DNI but would want \"selective treatment\".  I see that her pulsed form was signed by a family member Rachele Ndiaye and try to contact that them number at her listed phone number.  I was unable to contact her, but I left a message .  At this point we have to assume she would want medical treatment but will not initiate CPR should she go into " cardiac arrest.    With her cough and chest pain we were initially concerned about possible pneumonia.  We started the patient on empiric antibiotics and sent blood cultures.  Chest x-ray ultimately came back negative.  With her pleuritic appearing chest pain we did consider PE.  Fortunately CTPA is negative.  After we obtain the CT scan we obtained a fax copy of her meds and allergies and she is listed to be allergic to IV contrast dye.  She has had no sign of allergic reaction while here in the ER.  The patient was frequently coughing during her serial lung exams.  I felt that she was having so much difficulty breathing out there might be some bronchospasm for her.  We did administer nebs and corticosteroids, without much improvement.  After we obtained fax copies of her med list, we see there is no clear history of COPD or asthma and no other bronchodilators her controller meds on her list.  Blood gas shows no evidence for CO2 retention or respiratory acidosis    The patient's EKG is difficult to interpret because she has a pace maker. Shows no obvious ST elevation by [place] criteria.  Troponin is elevated at 0.18.[MP1.1]  Aspirin given.  Initially unclear what her goals of care were.  After discussion with her family they would like to investigate for ACS to see if this is truly a significant troponin or not and to see if there is any serial rise.  We will had a heparin.  Patient is not really reporting ongoing ischemic sounding pain right now.[MP1.2]    Diagnosis:[SP1.1]    ICD-10-CM   1. Acute chest pain R07.9   2. Troponin level elevated R74.8[MP1.3]       Disposition:[SP1.1]  Admitted to[SP1.15] hospitals [facility] accepting for[MP1.2] [SP1.15] Fan[SP1.20]      Bhavya Reyes  7/25/2018   [facility] EMERGENCY DEPARTMENT[SP1.1]  I, Bhavya Reyes, am serving as a scribe at 4:02 AM on 7/25/2018 to document services personally performed by Alvaro Lemons MD based on my  observations and the provider's statements to me.[SP1.2]      Alvaro Lemons MD  07/25/18 1728  [MP1.4]     Revision History        User Key Date/Time User Provider Type Action    > MP1.4 7/25/2018  5:28 PM Alvaro Lemons MD Physician Sign     MP1.2 7/25/2018  5:25 PM Alvaro Lemons MD Physician      SP1.21 7/25/2018  8:02 AM Pressman, Bhavya Scribe Share     SP1.8 7/25/2018  8:01 AM Pressman, Bhavya Scribe Share     [N/A] 7/25/2018  7:53 AM Pressman, Bhavya Scribe Share     [N/A] 7/25/2018  7:49 AM Pressman, Bhavya Scribe Share     [N/A] 7/25/2018  7:48 AM Pressman, Bhavya Scribe Share     SP1.4 7/25/2018  7:46 AM Pressman, Bhavya Scribe Share     SP1.9 7/25/2018  7:41 AM Pressman, Bhavya Scribe Share     SP1.19 7/25/2018  7:39 AM Pressman, Bhavya Scribe      SP1.20 7/25/2018  7:38 AM Pressman, Bhavya Scribe Share     SP1.3 7/25/2018  7:34 AM Pressman, Bhavya Scribe Share     [N/A] 7/25/2018  7:29 AM Pressman, Bhavya Scribe Share     SP1.18 7/25/2018  7:28 AM Pressman, Bhavya Scribe Share     MP1.3 7/25/2018  7:27 AM Alvaro Lemons MD Physician Share     MP1.1 7/25/2018  7:19 AM Alvaro Lemons MD Physician      [N/A] 7/25/2018  7:16 AM Alvaro Lemons MD Physician Share     SP1.14 7/25/2018  7:16 AM Pressman, Bhavya Scribe Share     [N/A] 7/25/2018  7:02 AM Pressman, Bhavya Scribe Share     [N/A] 7/25/2018  7:00 AM Pressman, Bhavya Scribe Share     SP1.11 7/25/2018  7:00 AM Pressman, Bhavya Scribe Share     [N/A] 7/25/2018  6:36 AM Bhavya Reyesjoshuageorgi Share     SP1.16 7/25/2018  6:26 AM Bhavya Reyesibgeorgi Share     SP1.7 7/25/2018  6:08 AM Bhavya Reyesibe Share     SP1.15 7/25/2018  6:00 AM Bhavya Reyesjoshuae Share     SP1.17 7/25/2018  5:49 AM Bhavya Reyes Share     SP1.10 7/25/2018  5:33 AM Bhavya Reyeslaureen Share     [N/A] 7/25/2018  5:20 AM Bhavya Reyesjoshuae Share     [N/A] 7/25/2018  4:37 AM Bhavya Reyes  Martha Share     SP1.13 7/25/2018  4:36 AM Bhavya Reyes Share     SP1.12 7/25/2018  4:34 AM Bhavya Reyese      [N/A] 7/25/2018  4:09 AM Bhavya Reyes Scribe Share     SP1.6 7/25/2018  4:06 AM Pressman, Bhavya Scribe Share     SP1.2 7/25/2018  3:58 AM Pressman, Bhavya Scribe      SP1.5 7/25/2018  3:57 AM Pressman, Bhavya Scribe Share     SP1.1 7/25/2018  3:54 AM BelenmanBhavya Scribe Share            Progress Notes by Oscar Jalloh MD at 7/25/2018  3:24 PM     Author:  Oscar Jalloh MD Service:  Cardiology Author Type:  Physician    Filed:  7/25/2018  3:29 PM Date of Service:  7/25/2018  3:24 PM Creation Time:  7/25/2018  3:24 PM    Status:  Signed :  Oscar Jalloh MD (Physician)         Cardiology follow-up note:    The patient's repeat troponin has dropped and her transthoracic echocardiogram shows preserved systolic function with no regional wall motion abnormalities.  I had a long discussion with the patient's daughters and patient.  At this time they elected medical management which I think is very reasonable.  We did discuss a stress test and have elected to not proceed with a stress test but rather medical therapy.  I would recommend antiplatelet treatment with aspirin 81 mg a day, statin therapy with atorvastatin and to also continue her beta blocker unchanged.  The patient's transthoracic echocardiogram shows grade 3 diastolic dysfunction and her N-terminal proBNP is elevated, however, she does not appear markedly volume overloaded on physical examination and her CT scan did not show pulmonary vascular congestion.  Consideration could be made for a low dose loop diuretic such as Lasix 10 mg daily to avoid acute decompensated heart failure in the future.  Cardiology will sign off at this time.  Please reconsult if needed.[RD1.1]    Oscar Jalloh MD[RD1.2]       Revision History        User Key Date/Time User Provider Type Action    > RD1.2 7/25/2018  3:29 PM Yeimi  "MD Oscar Physician Sign     RD1.1 7/25/2018  3:24 PM Oscar Jalloh MD Physician             Progress Notes by Althea Gupta RT at 7/25/2018 12:06 PM     Author:  Althea Gupta RT Service:  Respiratory Therapy Author Type:  Respiratory Therapist    Filed:  7/25/2018 12:07 PM Date of Service:  7/25/2018 12:06 PM Creation Time:  7/25/2018 12:06 PM    Status:  Signed :  Althea Gupta RT (Respiratory Therapist)         Date:7/25/2018  Admission Dx: Acute Chest Pain, Elevated troponin, Cough, dry  Pulmonary History: none  Home Nebulizer/MDI Use: none  Home Oxygen: none    Acuity Level (RCAT flow sheet): level 4/ prn    Aerosol Therapy initiated: Albuterol Q2 prn    Pulmonary Hygiene initiated: Deep breathe and cough    Volume Expansion initiated: IS    Current Oxygen Requirements: Room air     Current SpO2: 94%    Re-evaluation date: 28 July    Patient Education: Patient informed of medication benefits and possible side effects along with family present in room.     See \"RT Assessments\" flow sheet for patient assessment scoring and Acuity Level Details.[KT1.1]              Revision History        User Key Date/Time User Provider Type Action    > KT1.1 7/25/2018 12:07 PM Althea Gupta RT Respiratory Therapist Sign            ED Notes by Cecilia Liz RN at 7/25/2018  7:20 AM     Author:  Cecilia Liz RN Service:  (none) Author Type:  Registered Nurse    Filed:  7/25/2018  9:06 AM Date of Service:  7/25/2018  7:20 AM Creation Time:  7/25/2018  7:20 AM    Status:  Addendum :  Cecilia Liz, RN (Registered Nurse)         [facility]  ED Nurse Handoff Report    Leighann Regan is a 87 year old female   ED Chief complaint: Cough  . ED Diagnosis:   Final diagnoses:   Acute chest pain   Troponin level elevated     Allergies:   Allergies   Allergen Reactions     Codeine        Code Status: DNR / DNI  Activity level - Baseline/Home:  Unable to Assess - history of " dementia and poor historian, no family member here to provide history, difficulty contacting patient's senior living center. Activity Level - Current:   Unable to Assess - coughing and sob, not yet attempted to get patient out of bed. Lift room needed: No. Bariatric: No   Needed: No   Isolation: No. Infection: Not Applicable.     Vital Signs:   Vitals:    07/25/18 0600 07/25/18 0615 07/25/18 0645 07/25/18 0700   BP: 134/78  133/84 130/66   Resp: 19 23 25 22   Temp:       TempSrc:       SpO2:  98% 98% 97%       Cardiac Rhythm:  ,   Cardiac  Cardiac Rhythm: Normal sinus rhythm;Ventricular paced  Pain level:    Patient confused: Yes - dementia. Patient Falls Risk: Yes.   Elimination Status: Has not yet voided in ED   Patient Report - Initial Complaint: Leighann Regan is a 87 year old female with a history of dementia who presents via EMS with a cough. The patient lives in a nursing home, where staff reports that she has a cough and left-sided chest pain, prompting them to call EMS to transport the patient to the ED for evaluation. Of note, the patient states she does not know why she is in the ED, but that she recently walked in weed killer. Focused Assessment: Patient endorses right sided chest pain that is worst with coughing, frequent coughing, and expiratory wheezing. Did received 3 nebulizers and stated feel mildly better, though patient still has expiratory wheezes.    Tests Performed: labs, CXR, CTPE. Abnormal Results:[CT1.1]   Labs Ordered and Resulted from Time of ED Arrival Up to the Time of Departure from the ED   CBC WITH PLATELETS DIFFERENTIAL - Abnormal; Notable for the following:        Result Value    WBC 15.7 (*)     Absolute Neutrophil 12.7 (*)     Absolute Monocytes 1.4 (*)     All other components within normal limits   TROPONIN I - Abnormal; Notable for the following:     Troponin I ES 0.184 (*)     All other components within normal limits   COMPREHENSIVE METABOLIC PANEL - Abnormal;  Notable for the following:     Glucose 121 (*)     Creatinine 1.09 (*)     GFR Estimate 47 (*)     GFR Estimate If Black 57 (*)     Calcium 8.4 (*)     Albumin 3.0 (*)     All other components within normal limits   ISTAT  GASES LACTATE JULIOCESAR POCT - Abnormal; Notable for the following:     Ph Venous 7.45 (*)     PCO2 Venous 38 (*)     All other components within normal limits   LIPASE   PERIPHERAL IV CATHETER   CARDIAC CONTINUOUS MONITORING   ISTAT CG4 GASES LACTATE JULIOCESAR NURSING POCT   BLOOD CULTURE   BLOOD CULTURE     CT Chest Pulmonary Embolism w Contrast   Final Result   IMPRESSION:   1. No visualized pulmonary embolus.   2. No evidence of active pulmonary disease.      SATISH HERNANDEZ MD      XR Chest 2 Views   Final Result   IMPRESSION: No convincing evidence of active cardiopulmonary disease.      SATISH HERNANDEZ MD[CT1.2]         Treatments provided: Fluids, Antibiotics, Aspirin, Solumedrol, Nebulizer.   Patient did stated some improvements after 3 nebulizers, though still has expiratory wheezes and frequent dry coughs.   Family Comments: NA  OBS brochure/video discussed/provided to patient:  N/A  ED Medications:   Medications   0.9% sodium chloride BOLUS (0 mLs Intravenous Stopped 7/25/18 0550)     Followed by   sodium chloride 0.9% infusion (not administered)   azithromycin (ZITHROMAX) 500 mg in sodium chloride 0.9 % 250 mL intermittent infusion (0 mg Intravenous Stopped 7/25/18 0713)   ipratropium - albuterol 0.5 mg/2.5 mg/3 mL (DUONEB) neb solution 3 mL (not administered)   albuterol neb solution 2.5 mg (2.5 mg Nebulization Given 7/25/18 0559)   ipratropium - albuterol 0.5 mg/2.5 mg/3 mL (DUONEB) neb solution 3 mL (3 mLs Nebulization Given 7/25/18 0502)   cefTRIAXone (ROCEPHIN) 1 g vial to attach to  mL bag for ADULTS or NS 50 mL bag for PEDS (0 g Intravenous Stopped 7/25/18 0527)   aspirin chewable tablet 324 mg (324 mg Oral Given 7/25/18 0554)   methylPREDNISolone sodium succinate (solu-MEDROL)  injection 125 mg (125 mg Intravenous Given 7/25/18 0553)   0.9% sodium chloride BOLUS (0 mLs Intravenous Stopped 7/25/18 0633)   iopamidol (ISOVUE-370) solution 500 mL (68 mLs Intravenous Given 7/25/18 0631)     Drips infusing:  No  For the majority of the shift, the patient's behavior Green. Interventions performed were History of dementia. Patient is pleasant and does not attempt to get out of bed. Follows commands.     Severe Sepsis OR Septic Shock Diagnosis Present: No      ED Nurse Name/Phone Number: Cas Beebe,   7:20 AM[CT1.1]    RECEIVING UNIT ED HANDOFF REVIEW    Above ED Nurse Handoff Report was reviewed: Yes  Reviewed by: Cecilia Liz on July 25, 2018 at 9:05 AM[AH1.1]          Revision History        User Key Date/Time User Provider Type Action    > AH1.1 7/25/2018  9:06 AM Cecilia Liz RN Registered Nurse Addend     CT1.2 7/25/2018  7:34 AM Cas Beebe RN Registered Nurse Sign     CT1.1 7/25/2018  7:20 AM Cas Beebe RN Registered Nurse             ED Notes by Nila Flores RN at 7/25/2018  3:44 AM     Author:  Nila Flores RN Service:  (none) Author Type:  Registered Nurse    Filed:  7/25/2018  3:44 AM Date of Service:  7/25/2018  3:44 AM Creation Time:  7/25/2018  3:44 AM    Status:  Signed :  Nila Flores RN (Registered Nurse)         Pt unable to answer some triage questions due to dementia.[AP1.1]      Revision History        User Key Date/Time User Provider Type Action    > AP1.1 7/25/2018  3:44 AM Nila Flores RN Registered Nurse Sign            ED Notes by Belkys Freed RN at 7/25/2018  3:41 AM     Author:  Belkys Freed RN Service:  (none) Author Type:  Registered Nurse    Filed:  7/25/2018  3:41 AM Date of Service:  7/25/2018  3:41 AM Creation Time:  7/25/2018  3:41 AM    Status:  Signed :  Belkys Freed RN (Registered Nurse)         Bed: ED15  Expected date:   Expected time:   Means of arrival:   Comments:  arpita 86yo fever., sob      "Revision History        User Key Date/Time User Provider Type Action    > GJ1.1 7/25/2018  3:41 AM Belkys Freed RN Registered Nurse Sign                  Procedure Notes     No notes of this type exist for this encounter.         Progress Notes - Therapies (Notes from 07/23/18 through 07/26/18)      Progress Notes by Althea Gupta RT at 7/25/2018 12:06 PM     Author:  Althea Gupta RT Service:  Respiratory Therapy Author Type:  Respiratory Therapist    Filed:  7/25/2018 12:07 PM Date of Service:  7/25/2018 12:06 PM Creation Time:  7/25/2018 12:06 PM    Status:  Signed :  Althea Gupta RT (Respiratory Therapist)         Date:7/25/2018  Admission Dx: Acute Chest Pain, Elevated troponin, Cough, dry  Pulmonary History: none  Home Nebulizer/MDI Use: none  Home Oxygen: none    Acuity Level (RCAT flow sheet): level 4/ prn    Aerosol Therapy initiated: Albuterol Q2 prn    Pulmonary Hygiene initiated: Deep breathe and cough    Volume Expansion initiated: IS    Current Oxygen Requirements: Room air     Current SpO2: 94%    Re-evaluation date: 28 July    Patient Education: Patient informed of medication benefits and possible side effects along with family present in room.     See \"RT Assessments\" flow sheet for patient assessment scoring and Acuity Level Details.[KT1.1]              Revision History        User Key Date/Time User Provider Type Action    > KT1.1 7/25/2018 12:07 PM Althea Gupta RT Respiratory Therapist Sign            "

## 2018-07-25 NOTE — H&P
[facility] Internal Medicine  History and Physical      Patient Name: Leighann Regan MRN# [id number]   Age: 87 year old YOB: 1931     Date of Admission:7/25/2018    Primary care provider: No primary care provider on file.  Date of Service: 7/25/2018         Assessment and Plan:   Leighann Regan is a 87 year old female with a history of HTN, PPM, Dementia who presents from her DASIA 2/2 cough and chest pain.    ED work up revealed patient normotensive, tachycardic 90s, temp 99.3, 94% on room air.  Laboratory work up revealed Troponin 0.184, creatinine 1.09, wbc 15.7 with otherwise normal CMP, Lipase, Lactic Acid, CBC.  CXR revealed no acute abnormalities.  CT chest was negative.   Patient received IVF, ASA, Zithromax, Ceftriaxone, Duoneb, IV Solumedrol, Albuterol and admitted for further management.    Acute Chest Pain, Elevated Troponin - acute onset of right sided chest pain earlier today.  No hx of any trauma.  Patient complains of cough and chest pain which sounds pleuritic in nature, however troponin elevated to 0.184 concerning for possible ACS.  CXR and CT chest negative for acute abnormality.  Patient received ASA and started on a heparin infusion in the ED.  - serial troponin levels and telemetry monitoring  - check BNP  - continue heparin infusion, ASA and Betablocker  - 2d echocardiogram ordered  - Cardiology consulted.    Cough, Leukocytosis - mild cough noted by family one week ago, now with dry cough on exam and few scattered wheezes.  No hx of Asthma, COPD or Tobacco Abuse.  Imaging negative for Acute Pneumonia.  WBC elevated at 15.7 and stable on room air.  Possible viral process may be contributing to above chest pain.  - continue scheduled duonebs  - check procalcitonin, blood cultures and UA.  Hold further antibiotics for now    HTN - bp stable.    - continue home Metoprolol.    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    - interrogate ppm     Dementia - diagnosed  around 3 years ago.  Oriented to person only at baseline.  Not currently on medications.       CODE: Full - discussed with POA at the bedside.  Has a POLST which states DNR, but family reports they would like resuscitation and short trial of intubation if required, but not prolonged life on a ventilator.  Diet/IVF: regular, NS  GI ppx:  pepcid  DVT ppx: on heparin    Patient discussed with Dr. Fan Watkins MS PA-C  Physician Assistant   Hospitalist Service  Pager: [phone]           Chief Complaint:   Chest Pain, Cough         HPI:   87 year old female with a history of HTN, PPM, Dementia who presents from her DASIA 2/2 cough and chest pain.    Patient has dementia and is oriented to person only and is currently at baseline.  Family at the bedside reports she had a mild cough last week.  Today, her residential called the POA in the middle of the night as patient reported chest pain and a cough.  EMS was called.  Patient complains of right sided chest pain worse with coughing.  Family denies hx of any lung disease, tobacco, heart disease.  Patient is otherwise without complaints, but ROS limited 2/2 hx of dementia.           Past Medical History:     Past Medical History:   Diagnosis Date     Constipation      Dementia      HTN (hypertension)      Macular degeneration      Pacemaker      Seborrheic dermatitis           Past Surgical History:     Past Surgical History:   Procedure Laterality Date     HYSTERECTOMY            Social History:     Social History     Social History     Marital status:      Spouse name: N/A     Number of children: 7     Years of education: N/A     Occupational History     retired      Social History Main Topics     Smoking status: Never Smoker     Smokeless tobacco: Not on file     Alcohol use No     Drug use: No     Sexual activity: Not on file     Other Topics Concern     Not on file     Social History Narrative     No narrative on file          Family History:     Family  History   Problem Relation Age of Onset     Cerebrovascular Disease Mother      Diabetes Mother           Allergies:      Allergies   Allergen Reactions     Codeine           Medications:     Prior to Admission medications    Not on File          Review of Systems:   A complete ROS was performed and is negative other than what is stated in the HPI.       Physical Exam:   Blood pressure 130/66, temperature 99.3  F (37.4  C), temperature source Oral, resp. rate 22, SpO2 97 %. on room air  General: Alert, interactive, NAD, sitting up in bed with daughters at the bedside, frequent dry cough.  Hard of hearing.  HEENT: AT/NC, sclera anicteric, PERRL, EOMI  Chest/Resp: few coarse breath sounds bilaterally, few upper airway wheezes  Heart/CV: regular rate and rhythm, no murmur  Abdomen/GI: Soft, nontender, nondistended. +BS.  No rebound or guarding.  Extremities/MSK: Trace BLE edema  Skin: Warm and dry  Neuro: Alert & oriented to person only.  Not to time or place. No focal deficits, moves all extremities equally         Labs:   ROUTINE ICU LABS (Last four results)  CMP  Recent Labs  Lab 07/25/18  0413      POTASSIUM 3.5   CHLORIDE 103   CO2 25   ANIONGAP 7   *   BUN 17   CR 1.09*   GFRESTIMATED 47*   GFRESTBLACK 57*   ALESHA 8.4*   PROTTOTAL 7.5   ALBUMIN 3.0*   BILITOTAL 1.1   ALKPHOS 89   AST 20   ALT 18     CBC  Recent Labs  Lab 07/25/18  0413   WBC 15.7*   RBC 4.71   HGB 14.4   HCT 44.3   MCV 94   MCH 30.6   MCHC 32.5   RDW 13.2        INRNo lab results found in last 7 days.  Arterial Blood GasNo lab results found in last 7 days.       Imaging/Procedures:     Results for orders placed or performed during the hospital encounter of 07/25/18   XR Chest 2 Views    Narrative    CHEST 2 VIEWS  7/25/2018 4:46 AM     HISTORY: Cough. Right-sided chest pain.    COMPARISON: None.    FINDINGS: No convincing pulmonary opacities. Borderline cardiomegaly.  Atherosclerotic calcification in the thoracic aorta. Left  anterior  chest wall cardiac device with lead tips in the right atrium and right  ventricle.      Impression    IMPRESSION: No convincing evidence of active cardiopulmonary disease.    SATISH HERNANDEZ MD   CT Chest Pulmonary Embolism w Contrast    Narrative    CT CHEST WITH CONTRAST  7/25/2018 6:31 AM     HISTORY: Dyspnea.    COMPARISON: None.    TECHNIQUE: Following the uneventful administration of 68 mL Isovue-370  intravenous contrast, helical sections were acquired through the lungs  according to the pulmonary embolism protocol. Coronal reconstructions  were generated. Radiation dose for this scan was reduced using  automated exposure control, adjustment of the mA and/or kV according  to the patient's size, or iterative reconstruction technique.    FINDINGS: No visualized pulmonary embolus. The thoracic aorta is  normal in caliber without dissection. Atherosclerotic calcification in  the thoracic aorta. Left anterior chest wall cardiac device with lead  tips in the right atrium and right ventricle.    A few linear and band-like opacities in bilateral lung bases, most  likely representing atelectasis and/or scarring. The lungs are  otherwise clear. No pleural or pericardial effusion. No enlarged lymph  nodes in the chest. Heterogeneous thyroid gland.    Scan through the upper abdomen is unremarkable.      Impression    IMPRESSION:  1. No visualized pulmonary embolus.  2. No evidence of active pulmonary disease.    SATISH HERNANDEZ MD

## 2018-07-25 NOTE — IP AVS SNAPSHOT
"                  MRN:[id number]                      After Visit Summary   7/25/2018    Leighann Regan    MRN: [id number]           Thank you!     Thank you for choosing [facility] for your care. Our goal is always to provide you with excellent care. Hearing back from our patients is one way we can continue to improve our services. Please take a few minutes to complete the written survey that you may receive in the mail after you visit. If you would like to speak to someone directly about your visit please contact Patient Relations at [phone]. Thank you!          Patient Information     Date Of Birth          1/15/1931        Designated Caregiver       Most Recent Value    Caregiver    Name of designated caregiver Rachele    Phone number of caregiver [phone]      About your hospital stay     You were admitted on:  July 25, 2018 You last received care in the:  [place] [address] [facility]    You were discharged on:  July 26, 2018        Reason for your hospital stay       Chest pain   Cough                  Who to Call     For medical emergencies, please call 911.  For non-urgent questions about your medical care, please call your primary care provider or clinic, [phone]          Attending Provider     Provider Specialty    Alvaro Lemons MD Emergency Medicine    [facility] Rad[facility] Hopkins [facility] Emergency Medicine    Abhishek Chavira MD Internal Medicine       Primary Care Provider Office Phone #    Zachary Sky [phone]      After Care Instructions     Diet       Follow this diet upon discharge: Regular diet                  Follow-up Appointments     Follow-up and recommended labs and tests        Follow up with primary care provider, Zachary Syk, within 7 days for hospital follow- up.     -- Complete the course of antibiotics as prescribed  -- You are being given medication called tessalon and robitussin to help with cough   -- Also short course of diuretic \"water " "pill\" for next 5 days to help with the swelling. Take this in the morning time. Discuss with your primary doctor if you need to take this longer term                  Pending Results     Date and Time Order Name Status Description    2018 1048 Beta strep group A culture In process     2018 1000 Blood culture Preliminary     2018 0412 Blood culture Preliminary     2018 0408 Blood culture Preliminary             Statement of Approval     Ordered          18 1146  I have reviewed and agree with all the recommendations and orders detailed in this document.  EFFECTIVE NOW     Approved and electronically signed by:  Gabriela Garcia MD             Admission Information     Date & Time Provider Department Dept. Phone    2018 Abhishek Chavira MD [place] [address] [facility] [phone]      Your Vitals Were     Blood Pressure Pulse Temperature Respirations Height Weight    146/65 (BP Location: Right arm) 65 97.1  F (36.2  C) (Oral) 18 1.651 m (5' 5\") 83.1 kg (183 lb 3.2 oz)    Pulse Oximetry BMI (Body Mass Index)                95% 30.49 kg/m2          MyChart Information     [employer] lets you send messages to your doctor, view your test results, renew your prescriptions, schedule appointments and more. To sign up, go to [email or web address] . Click on \"Log in\" on the left side of the screen, which will take you to the Welcome page. Then click on \"Sign up Now\" on the right side of the page.     You will be asked to enter the access code listed below, as well as some personal information. Please follow the directions to create your username and password.     Your access code is: [email or web address]  Expires: 10/24/2018  1:22 PM     Your access code will  in 90 days. If you need help or a new code, please call your [facility] or [phone].        Care EveryWhere ID     This is your Care EveryWhere ID. This could be used by other organizations to access your [facility] medical " records  [id number]        Equal Access to Services     [facility] RICCO : Hadii rachel klein hadleoniegood Soguyali, waaxda luqadaha, qaybta kaalmada dexter, ruby pughmakpavel pino. So [facility] [phone].    ATENCIÓN: Si habla español, tiene a ramirez disposición servicios gratuitos de asistencia lingüística. Llame al [phone].    We comply with applicable federal civil rights laws and [place] laws. We do not discriminate on the basis of race, color, national origin, age, disability, sex, sexual orientation, or gender identity.               Review of your medicines      START taking        Dose / Directions    atorvastatin 10 MG tablet   Commonly known as:  LIPITOR   Used for:  Acute chest pain, Troponin level elevated        Dose:  10 mg   Take 1 tablet (10 mg) by mouth every evening   Quantity:  30 tablet   Refills:  0       azithromycin 250 MG tablet   Commonly known as:  ZITHROMAX   Used for:  Acute bronchitis, unspecified organism        Dose:  250 mg   Take 1 tablet (250 mg) by mouth daily for 2 days   Quantity:  2 tablet   Refills:  0       benzonatate 100 MG capsule   Commonly known as:  TESSALON   Used for:  Acute bronchitis, unspecified organism        Dose:  100 mg   Take 1 capsule (100 mg) by mouth 3 times daily as needed for cough   Quantity:  42 capsule   Refills:  0       cefdinir 300 MG capsule   Commonly known as:  OMNICEF   Used for:  Acute bronchitis, unspecified organism, Dysuria        Dose:  300 mg   Take 1 capsule (300 mg) by mouth 2 times daily for 5 days   Quantity:  10 capsule   Refills:  0       furosemide 20 MG tablet   Commonly known as:  LASIX   Used for:  Diastolic dysfunction        Dose:  20 mg   Take 1 tablet (20 mg) by mouth daily for 5 days   Quantity:  5 tablet   Refills:  0       guaiFENesin-dextromethorphan 100-10 MG/5ML syrup   Commonly known as:  ROBITUSSIN DM   Used for:  Acute bronchitis, unspecified organism        Dose:  5 mL   Take 5 mLs by mouth every 4 hours  as needed for cough   Quantity:  560 mL   Refills:  0         CONTINUE these medicines which have NOT CHANGED        Dose / Directions    acetaminophen 325 MG tablet   Commonly known as:  TYLENOL        Dose:  650 mg   Take 650 mg by mouth every 8 hours as needed for mild pain   Refills:  0       aspirin 81 MG chewable tablet        Dose:  81 mg   Take 81 mg by mouth daily   Refills:  0       Calcium Carb-Cholecalciferol 600-800 MG-UNIT Tabs        Dose:  1 tablet   Take 1 tablet by mouth daily   Refills:  0       meclizine 25 MG tablet   Commonly known as:  ANTIVERT        Dose:  25 mg   Take 25 mg by mouth 3 times daily as needed for dizziness   Refills:  0       metoprolol succinate 50 MG 24 hr tablet   Commonly known as:  TOPROL-XL        Dose:  50 mg   Take 50 mg by mouth daily   Refills:  0       mupirocin 2 % cream   Commonly known as:  BACTROBAN        Apply topically 2 times daily as needed Apply to scalp   Refills:  0       PRESERVISION AREDS 2 Caps        Dose:  1 capsule   Take 1 capsule by mouth daily   Refills:  0       senna-docusate 8.6-50 MG per tablet   Commonly known as:  SENOKOT-S;PERICOLACE        Dose:  1 tablet   Take 1 tablet by mouth daily as needed for constipation   Refills:  0            Where to get your medicines      Some of these will need a paper prescription and others can be bought over the counter. Ask your nurse if you have questions.     Bring a paper prescription for each of these medications     atorvastatin 10 MG tablet    azithromycin 250 MG tablet    benzonatate 100 MG capsule    cefdinir 300 MG capsule    furosemide 20 MG tablet    guaiFENesin-dextromethorphan 100-10 MG/5ML syrup                Protect others around you: Learn how to safely use, store and throw away your medicines at www.disposemymeds.org.        ANTIBIOTIC INSTRUCTION     You've Been Prescribed an Antibiotic - Now What?  Your healthcare team thinks that you or your loved one might have an infection. Some  infections can be treated with antibiotics, which are powerful, life-saving drugs. Like all medications, antibiotics have side effects and should only be used when necessary. There are some important things you should know about your antibiotic treatment.      Your healthcare team may run tests before you start taking an antibiotic.    Your team may take samples (e.g., from your blood, urine or other areas) to run tests to look for bacteria. These test can be important to determine if you need an antibiotic at all and, if you do, which antibiotic will work best.      Within a few days, your healthcare team might change or even stop your antibiotic.    Your team may start you on an antibiotic while they are working to find out what is making you sick.    Your team might change your antibiotic because test results show that a different antibiotic would be better to treat your infection.    In some cases, once your team has more information, they learn that you do not need an antibiotic at all. They may find out that you don't have an infection, or that the antibiotic you're taking won't work against your infection. For example, an infection caused by a virus can't be treated with antibiotics. Staying on an antibiotic when you don't need it is more likely to be harmful than helpful.      You may experience side effects from your antibiotic.    Like all medications, antibiotics have side effects. Some of these can be serious.    Let you healthcare team know if you have any known allergies when you are admitted to the hospital.    One significant side effect of nearly all antibiotics is the risk of severe and sometimes deadly diarrhea caused by Clostridium difficile (C. Difficile). This occurs when a person takes antibiotics because some good germs are destroyed. Antibiotic use allows C. diificile to take over, putting patients at high risk for this serious infection.    As a patient or caregiver, it is important to  understand your or your loved one's antibiotic treatment. It is especially important for caregivers to speak up when patients can't speak for themselves. Here are some important questions to ask your healthcare team.    What infection is this antibiotic treating and how do you know I have that infection?    What side effects might occur from this antibiotic?    How long will I need to take this antibiotic?    Is it safe to take this antibiotic with other medications or supplements (e.g., vitamins) that I am taking?     Are there any special directions I need to know about taking this antibiotic? For example, should I take it with food?    How will I be monitored to know whether my infection is responding to the antibiotic?    What tests may help to make sure the right antibiotic is prescribed for me?      Information provided by:  www.cdc.gov/getsmart  U.S. Department of Health and Human Services  Centers for disease Control and Prevention  National Center for Emerging and Zoonotic Infectious Diseases  Division of Healthcare Quality Promotion             Medication List: This is a list of all your medications and when to take them. Check marks below indicate your daily home schedule. Keep this list as a reference.      Medications           Morning Afternoon Evening Bedtime As Needed    acetaminophen 325 MG tablet   Commonly known as:  TYLENOL   Take 650 mg by mouth every 8 hours as needed for mild pain                                   aspirin 81 MG chewable tablet   Take 81 mg by mouth daily   Last time this was given:  81 mg on 7/26/2018  8:20 AM   Next Dose Due:  7/27                                   atorvastatin 10 MG tablet   Commonly known as:  LIPITOR   Take 1 tablet (10 mg) by mouth every evening   Last time this was given:  10 mg on 7/25/2018  8:13 PM   Next Dose Due:  7/26                                       azithromycin 250 MG tablet   Commonly known as:  ZITHROMAX   Take 1 tablet (250 mg) by mouth  daily for 2 days   Next Dose Due:  7/27                                   benzonatate 100 MG capsule   Commonly known as:  TESSALON   Take 1 capsule (100 mg) by mouth 3 times daily as needed for cough   Last time this was given:  100 mg on 7/26/2018  9:13 AM                                   Calcium Carb-Cholecalciferol 600-800 MG-UNIT Tabs   Take 1 tablet by mouth daily   Next Dose Due:  7/26, can take today.                                     cefdinir 300 MG capsule   Commonly known as:  OMNICEF   Take 1 capsule (300 mg) by mouth 2 times daily for 5 days   Next Dose Due:  7/26, first dose today.                                      furosemide 20 MG tablet   Commonly known as:  LASIX   Take 1 tablet (20 mg) by mouth daily for 5 days   Next Dose Due:  7/26, first dose today.                                   guaiFENesin-dextromethorphan 100-10 MG/5ML syrup   Commonly known as:  ROBITUSSIN DM   Take 5 mLs by mouth every 4 hours as needed for cough                                   meclizine 25 MG tablet   Commonly known as:  ANTIVERT   Take 25 mg by mouth 3 times daily as needed for dizziness                                   metoprolol succinate 50 MG 24 hr tablet   Commonly known as:  TOPROL-XL   Take 50 mg by mouth daily   Last time this was given:  50 mg on 7/26/2018  8:20 AM   Next Dose Due:  7/27                                   mupirocin 2 % cream   Commonly known as:  BACTROBAN   Apply topically 2 times daily as needed Apply to scalp                                   PRESERVISION AREDS 2 Caps   Take 1 capsule by mouth daily   Next Dose Due:  7/26                                   senna-docusate 8.6-50 MG per tablet   Commonly known as:  SENOKOT-S;PERICOLACE   Take 1 tablet by mouth daily as needed for constipation                                             More Information        Discharge Instructions for Angina  You have been diagnosed with a type of chest pain called angina. Angina occurs when not  enough oxygen reaches the heart muscle. It is most often felt under your breastbone, in your left shoulder, or down your left arm. The pain may even spread to your jaw or back. Exercise, increased activity, emotional upset, or stress can trigger this pain. With proper treatment and lifestyle changes to reduce risk factors, most people with angina are able to maintain a full and active life.  Managing risk factors  Your healthcare provider will work with you to make lifestyle changes as needed. This can help prevent worsening of coronary artery disease, which is likely the cause of your angina.  Coronary artery disease is a narrowing of the blood vessels that supply oxygen and nutrients to the heart muscle. The blood vessels can also spasm and reduce the oxygen reaching the heart muscle from the narrowing inside of the artery. Managing your risk factors may prevent both of these causes of narrowing of your arteries.  Diet  Your healthcare provider will give you information on dietary changes that you may need to make, based on your situation. Your provider may recommend that you see a registered dietitian for help with diet changes. Try these changes to start:      Eat less fat and cholesterol    Eat less sodium (salt), especially if you have high blood pressure     Eat more fresh vegetables and fruits     Eat lean proteins, such as fish, poultry, and legumes (beans and peas), and eat less red meat and processed meats    Use low-fat dairy products     Use vegetable and nut oils in limited amounts     Limit sweets and processed foods such as chips, cookies, and baked goods    Limit sodas and high calorie drinks    Limit greasy and fried foods, or those high in saturated fat    Limit alcohol intake  Physical activity  Your healthcare provider may recommend that you increase your physical activity if you have not been as active as possible. This may include moderate to vigorous intensity physical activity for at least  30 to 60 minutes each day for at least 5 to 7 days per week. A few examples of moderate to vigorous intensity physical activity include:     Walking at a brisk pace, about 3 to 4 miles per hour    Jogging or running    Swimming or water aerobics    Hiking    Dancing    Martial arts    Tennis    Riding a bike  Don't start or increase your activity level without first seeing your healthcare provider.  Weight management  If you are overweight, your healthcare provider will work with you to lose weight and lower your BMI (body mass index) to a normal or near-normal level. Making diet changes and increasing physical activity can help. A healthy and reasonable goal for weight loss is to lose 10% of your current weight per year.  Smoking  If you smoke, break the smoking habit. Enroll in a stop-smoking program to improve your chances of success. You can also join a support group. Talk to your healthcare provider about nicotine replacement products or medicines to help you quit.  Stress  Learn ways to manage stress to help you deal with stress in your home and work life. Your ability to prioritize your health depends on your mental health and focus. Feeling supported in the rest of your life is key to achieving success with your health.  Managing medicines    Keep a record of your episodes of chest pain. Take these with you when you see your healthcare provider.    Take your medicines exactly as directed. Don t skip doses. If you miss a dose, call your healthcare provider right away.    If you have unwanted side effects from your medicine, tell your healthcare provider right away.  Taking nitroglycerin    Keep your nitroglycerin with you at all times.    If you re on nitroglycerin, don t take medicines used to treat erectile dysfunction (such as sildenafil) at all. These can react with nitroglycerin and cause your blood pressure to drop to a dangerous or even life-threatening level.    If you use nitroglycerin to prevent  angina attacks, follow your healthcare provider s instructions for your kind of nitroglycerin (pill, spray, or skin patch).    If you use nitroglycerin to stop an angina attack, follow these steps:  ? Sit down, because you may become dizzy.  ? Put 1 tablet under your tongue, or between your lip and gum, or between your cheek and gum. Let the tablet dissolve completely. Don't chew or swallow the tablet.  ? If you use a spray, then spray once on or under your tongue. Don't inhale. Close your mouth. Wait a few seconds before you swallow and don't rinse your mouth for 5 to 10 minutes.  ? After taking 1 tablet or spraying once, continue sitting for 5 minutes.  ? If the angina goes away completely, rest awhile and follow your provider's orders about returning to your normal routine.  ? If the chest pain or pressure continues, [id number] right away. Don't delay. You may be having a heart attack (acute myocardial infarction, or AMI)!  ? You may be told by your doctor to [id number] taking 2 or 3 tables or sprays of nitroglycerin (spaced 5 minutes apart) and the chest pain or pressure is still present 5 minutes after the last dose. Don't take more than 3 tablets, or spray more than 3 times, within 15 minutes.   When to call your healthcare provider  Call your healthcare provider right away if you have any of these:    Severe headache    Severe dizziness, or fainting    Nausea or vomiting    Fast heartbeat (higher than 100 beats per minute)    Swollen ankles    Weakness    Angina attacks that last longer, occur more often, or are more severe than in the past    Angina that occurs at rest, wakes you up out of sleep, or does not resolve   Date Last Reviewed: 6/1/2016 2000-2017 [employer]. [address] [place] [address], [place] [address]. All rights reserved. This information is not intended as a substitute for professional medical care. Always follow your healthcare professional's instructions.

## 2018-07-25 NOTE — IP AVS SNAPSHOT
"[place] [address] MEDICAL SURGICAL: [phone]                                              INTERAGENCY TRANSFER FORM - PHYSICIAN ORDERS   2018                    Hospital Admission Date: 2018  ADELAIDA HANSON   : 1/15/1931  Sex: Female        Attending Provider: Abhishek Chavira MD     Allergies:  Codeine, Ibuprofen, Latex, Sulfa Drugs, Tramadol    Infection:  None   Service:  GENERAL MEDI    Ht:  1.651 m (5' 5\")   Wt:  83.1 kg (183 lb 3.2 oz)   Admission Wt:  82.3 kg (181 lb 6.4 oz)    BMI:  30.49 kg/m 2   BSA:  1.95 m 2            Patient PCP Information     Provider PCP Type    Zachary Sky MD General      ED Clinical Impression     Diagnosis Description Comment Added By Time Added    Acute chest pain [R07.9] Acute chest pain [R07.9]  Alvaro Lemons MD 2018  7:18 AM    Troponin level elevated [R74.8] Troponin level elevated [R74.8]  Alvaro Lemons MD 2018  7:18 AM      Hospital Problems as of 2018              Priority Class Noted POA    Elevated troponin Medium  2018 Yes      Non-Hospital Problems as of 2018     None      Code Status History     Date Active Date Inactive Code Status Order ID Comments User Context    This patient has a current code status but no historical code status.         Medication Review      START taking        Dose / Directions Comments    atorvastatin 10 MG tablet   Commonly known as:  LIPITOR   Used for:  Acute chest pain, Troponin level elevated        Dose:  10 mg   Take 1 tablet (10 mg) by mouth every evening   Quantity:  30 tablet   Refills:  0    Future refills by PCP Dr. Zachary Sky with phone number [phone].       azithromycin 250 MG tablet   Commonly known as:  ZITHROMAX   Used for:  Acute bronchitis, unspecified organism        Dose:  250 mg   Take 1 tablet (250 mg) by mouth daily for 2 days   Quantity:  2 tablet   Refills:  0        benzonatate 100 MG capsule   Commonly known as:  TESSALON   Used for:  " Acute bronchitis, unspecified organism        Dose:  100 mg   Take 1 capsule (100 mg) by mouth 3 times daily as needed for cough   Quantity:  42 capsule   Refills:  0        cefdinir 300 MG capsule   Commonly known as:  OMNICEF   Used for:  Acute bronchitis, unspecified organism, Dysuria        Dose:  300 mg   Take 1 capsule (300 mg) by mouth 2 times daily for 5 days   Quantity:  10 capsule   Refills:  0        furosemide 20 MG tablet   Commonly known as:  LASIX   Used for:  Diastolic dysfunction        Dose:  20 mg   Take 1 tablet (20 mg) by mouth daily for 5 days   Quantity:  5 tablet   Refills:  0        guaiFENesin-dextromethorphan 100-10 MG/5ML syrup   Commonly known as:  ROBITUSSIN DM   Used for:  Acute bronchitis, unspecified organism        Dose:  5 mL   Take 5 mLs by mouth every 4 hours as needed for cough   Quantity:  560 mL   Refills:  0          CONTINUE these medications which have NOT CHANGED        Dose / Directions Comments    acetaminophen 325 MG tablet   Commonly known as:  TYLENOL        Dose:  650 mg   Take 650 mg by mouth every 8 hours as needed for mild pain   Refills:  0        aspirin 81 MG chewable tablet        Dose:  81 mg   Take 81 mg by mouth daily   Refills:  0        Calcium Carb-Cholecalciferol 600-800 MG-UNIT Tabs        Dose:  1 tablet   Take 1 tablet by mouth daily   Refills:  0        meclizine 25 MG tablet   Commonly known as:  ANTIVERT        Dose:  25 mg   Take 25 mg by mouth 3 times daily as needed for dizziness   Refills:  0        metoprolol succinate 50 MG 24 hr tablet   Commonly known as:  TOPROL-XL        Dose:  50 mg   Take 50 mg by mouth daily   Refills:  0        mupirocin 2 % cream   Commonly known as:  BACTROBAN        Apply topically 2 times daily as needed Apply to scalp   Refills:  0        PRESERVISION AREDS 2 Caps        Dose:  1 capsule   Take 1 capsule by mouth daily   Refills:  0        senna-docusate 8.6-50 MG per tablet   Commonly known as:   "SENOKOT-S;PERICOLACE        Dose:  1 tablet   Take 1 tablet by mouth daily as needed for constipation   Refills:  0                Summary of Visit     Reason for your hospital stay       Chest pain   Cough             After Care     Diet       Follow this diet upon discharge: Regular diet             Follow-Up Appointment Instructions     Future Labs/Procedures    Follow-up and recommended labs and tests      Comments:    Follow up with primary care provider, Zachary Sky, within 7 days for hospital follow- up.     -- Complete the course of antibiotics as prescribed  -- You are being given medication called tessalon and robitussin to help with cough   -- Also short course of diuretic \"water pill\" for next 5 days to help with the swelling. Take this in the morning time. Discuss with your primary doctor if you need to take this longer term      Follow-Up Appointment Instructions     Follow-up and recommended labs and tests        Follow up with primary care provider, Zachary Sky, within 7 days for hospital follow- up.     -- Complete the course of antibiotics as prescribed  -- You are being given medication called tessalon and robitussin to help with cough   -- Also short course of diuretic \"water pill\" for next 5 days to help with the swelling. Take this in the morning time. Discuss with your primary doctor if you need to take this longer term             Statement of Approval     Ordered          07/26/18 1146  I have reviewed and agree with all the recommendations and orders detailed in this document.  EFFECTIVE NOW     Approved and electronically signed by:  Gabriela Garcia MD               "

## 2018-07-25 NOTE — PLAN OF CARE
Problem: Patient Care Overview  Goal: Plan of Care/Patient Progress Review  PRIMARY DIAGNOSIS: CHEST PAIN  OUTPATIENT/OBSERVATION GOALS TO BE MET BEFORE DISCHARGE:  1. Ruled out acute coronary syndrome (negative or stable Troponin):  No- last trop 0.184  2. Pain Status: Improved with use of alternative comfort measures i.e.: positioning. Pain increases with coughing.  3. Appropriate provocative testing performed: N/A  - Stress Test Procedure: Echo completed  - Interpretation of cardiac rhythm per RN: V paced with BBB and ST depression    4. Cleared by Consultants (if applicable):No  5. Return to near baseline physical activity: Yes  Discharge Planner Nurse   Safe discharge environment identified: Yes  Barriers to discharge: Yes       Entered by: Cecilia Liz 07/25/2018 12:07 PM     Please review provider order for any additional goals.   Nurse to notify provider when observation goals have been met and patient is ready for discharge.

## 2018-07-25 NOTE — PROGRESS NOTES
"Date:7/25/2018  Admission Dx: Acute Chest Pain, Elevated troponin, Cough, dry  Pulmonary History: none  Home Nebulizer/MDI Use: none  Home Oxygen: none    Acuity Level (RCAT flow sheet): level 4/ prn    Aerosol Therapy initiated: Albuterol Q2 prn    Pulmonary Hygiene initiated: Deep breathe and cough    Volume Expansion initiated: IS    Current Oxygen Requirements: Room air     Current SpO2: 94%    Re-evaluation date: 28 July    Patient Education: Patient informed of medication benefits and possible side effects along with family present in room.     See \"RT Assessments\" flow sheet for patient assessment scoring and Acuity Level Details.           "

## 2018-07-25 NOTE — ED NOTES
[facility]  ED Nurse Handoff Report    Leighann Regan is a 87 year old female   ED Chief complaint: Cough  . ED Diagnosis:   Final diagnoses:   Acute chest pain   Troponin level elevated     Allergies:   Allergies   Allergen Reactions     Codeine        Code Status: DNR / DNI  Activity level - Baseline/Home:  Unable to Assess - history of dementia and poor historian, no family member here to provide history, difficulty contacting patient's senior living center. Activity Level - Current:   Unable to Assess - coughing and sob, not yet attempted to get patient out of bed. Lift room needed: No. Bariatric: No   Needed: No   Isolation: No. Infection: Not Applicable.     Vital Signs:   Vitals:    07/25/18 0600 07/25/18 0615 07/25/18 0645 07/25/18 0700   BP: 134/78  133/84 130/66   Resp: 19 23 25 22   Temp:       TempSrc:       SpO2:  98% 98% 97%       Cardiac Rhythm:  ,   Cardiac  Cardiac Rhythm: Normal sinus rhythm;Ventricular paced  Pain level:    Patient confused: Yes - dementia. Patient Falls Risk: Yes.   Elimination Status: Has not yet voided in ED   Patient Report - Initial Complaint: Leighann Regan is a 87 year old female with a history of dementia who presents via EMS with a cough. The patient lives in a nursing home, where staff reports that she has a cough and left-sided chest pain, prompting them to call EMS to transport the patient to the ED for evaluation. Of note, the patient states she does not know why she is in the ED, but that she recently walked in weed killer. Focused Assessment: Patient endorses right sided chest pain that is worst with coughing, frequent coughing, and expiratory wheezing. Did received 3 nebulizers and stated feel mildly better, though patient still has expiratory wheezes.    Tests Performed: labs, CXR, CTPE. Abnormal Results:   Labs Ordered and Resulted from Time of ED Arrival Up to the Time of Departure from the ED   CBC WITH PLATELETS DIFFERENTIAL -  Abnormal; Notable for the following:        Result Value    WBC 15.7 (*)     Absolute Neutrophil 12.7 (*)     Absolute Monocytes 1.4 (*)     All other components within normal limits   TROPONIN I - Abnormal; Notable for the following:     Troponin I ES 0.184 (*)     All other components within normal limits   COMPREHENSIVE METABOLIC PANEL - Abnormal; Notable for the following:     Glucose 121 (*)     Creatinine 1.09 (*)     GFR Estimate 47 (*)     GFR Estimate If Black 57 (*)     Calcium 8.4 (*)     Albumin 3.0 (*)     All other components within normal limits   ISTAT  GASES LACTATE JULIOCESAR POCT - Abnormal; Notable for the following:     Ph Venous 7.45 (*)     PCO2 Venous 38 (*)     All other components within normal limits   LIPASE   PERIPHERAL IV CATHETER   CARDIAC CONTINUOUS MONITORING   ISTAT CG4 GASES LACTATE JULIOCESAR NURSING POCT   BLOOD CULTURE   BLOOD CULTURE     CT Chest Pulmonary Embolism w Contrast   Final Result   IMPRESSION:   1. No visualized pulmonary embolus.   2. No evidence of active pulmonary disease.      SATISH HERNANDEZ MD      XR Chest 2 Views   Final Result   IMPRESSION: No convincing evidence of active cardiopulmonary disease.      SATISH HERNANDEZ MD         Treatments provided: Fluids, Antibiotics, Aspirin, Solumedrol, Nebulizer.   Patient did stated some improvements after 3 nebulizers, though still has expiratory wheezes and frequent dry coughs.   Family Comments: NA  OBS brochure/video discussed/provided to patient:  N/A  ED Medications:   Medications   0.9% sodium chloride BOLUS (0 mLs Intravenous Stopped 7/25/18 0550)     Followed by   sodium chloride 0.9% infusion (not administered)   azithromycin (ZITHROMAX) 500 mg in sodium chloride 0.9 % 250 mL intermittent infusion (0 mg Intravenous Stopped 7/25/18 3269)   ipratropium - albuterol 0.5 mg/2.5 mg/3 mL (DUONEB) neb solution 3 mL (not administered)   albuterol neb solution 2.5 mg (2.5 mg Nebulization Given 7/25/18 9559)   ipratropium - albuterol  0.5 mg/2.5 mg/3 mL (DUONEB) neb solution 3 mL (3 mLs Nebulization Given 7/25/18 0502)   cefTRIAXone (ROCEPHIN) 1 g vial to attach to  mL bag for ADULTS or NS 50 mL bag for PEDS (0 g Intravenous Stopped 7/25/18 0527)   aspirin chewable tablet 324 mg (324 mg Oral Given 7/25/18 0554)   methylPREDNISolone sodium succinate (solu-MEDROL) injection 125 mg (125 mg Intravenous Given 7/25/18 0553)   0.9% sodium chloride BOLUS (0 mLs Intravenous Stopped 7/25/18 0633)   iopamidol (ISOVUE-370) solution 500 mL (68 mLs Intravenous Given 7/25/18 0631)     Drips infusing:  No  For the majority of the shift, the patient's behavior Green. Interventions performed were History of dementia. Patient is pleasant and does not attempt to get out of bed. Follows commands.     Severe Sepsis OR Septic Shock Diagnosis Present: No      ED Nurse Name/Phone Number: Cas Beebe,   7:20 AM    RECEIVING UNIT ED HANDOFF REVIEW    Above ED Nurse Handoff Report was reviewed: Yes  Reviewed by: Cecilia Liz on July 25, 2018 at 9:05 AM

## 2018-07-25 NOTE — IP AVS SNAPSHOT
` ` Patient Information     Patient Name Sex     Leighann Regan ([id number]) Female 1/15/1931       Room Bed    0308 0308-01      Patient Demographics     Address Phone    [address] [facility] [id number] [phone] (Home)  none (Work)  [phone] (Mobile) *Preferred*      Patient Ethnicity & Race     Ethnic Group Patient Race    American White      Emergency Contact(s)     Name Relation Home Work Mobile    Rachele Francis [phone]        Documents on File        Status Date Received Description       Documents for the Patient    Consent for EHR Access Received 18     Insurance Card Received 18     Patient ID Received 18     [facility] Specified Other       Privacy Notice - [place] Received 18     Consent for Services - Hospital and Clinic Received 18     HIE Auth Received 18        Documents for the Encounter    CMS IM for Patient Signature Received 18     Observation Notice Received 18 code 44 Disc w/pt and prabhakar    CMS [place] for Patient Signature Received 18 code 44    Discharge Attachment   ANGINA, DISCHARGE INSTRUCTIONS FOR (ENGLISH)      Admission Information     Attending Provider Admitting Provider Admission Type Admission Date/Time    Abhishek Chavira MD Parpia, Abdul K, MD Emergency 18  0340    Discharge Date Hospital Service Auth/Cert Status Service Area     General [facility]    Unit Room/Bed Admission Status        3 MEDICAL SURGICAL 308/030- Admission (Confirmed)       Admission     Complaint    Elevated troponin, Elevated troponin      Hospital Account     Name Acct ID Class Status Primary Coverage    Leighann Regan [id number] Observation Open MEDICARE - MEDICARE FOR HB SUPPLEMENT            Guarantor Account (for Hospital Account #[id number])     Name Relation to Pt Service Area Active? Acct Type    Leighann Regan Self FCS Yes Personal/Family     Address Phone          [address] [place] [address] [phone](H)  none(O)              Coverage Information (for Hospital Account #[id number])     1. MEDICARE/MEDICARE FOR HB SUPPLEMENT     F/O Payor/Plan Precert #    MEDICARE/MEDICARE FOR HB SUPPLEMENT     Subscriber Subscriber #    ReganLeighann [id number]    Address Phone    [employer] CLAIM[employer]  [address]  [place] [address] [phone]          2. BCBS/BCBS PLATINUM BLUE     F/O Payor/Plan Precert #    BCBS/BCBS PLATINUM BLUE     Subscriber Subscriber #    ReganLeighann [id number]    Address Phone    [address] [address]  [place] [address] [phone]

## 2018-07-25 NOTE — CONSULTS
[facility]    Cardiology Consultation     Date of Admission:  7/25/2018    Assessment & Plan     The patient is a pleasant 87-year-old female with a history of bradycardia status post permanent pacemaker implantation, essential hypertension, and dementia who is admitted with atypical chest pain and found to have an initial elevated troponin of 0.184.  The patient's chest pain is not cardiac in origin as it is nearly continuous and worsened with deep inspiration.  I reviewed her CT scan and did not see coronary artery calcifications.  At this time I would repeat a stat troponin and complete her transthoracic echocardiogram.  In the interim I have added a low-dose statin to her medical regimen.  If her troponin does not rise significantly or decreases I would discontinue her IV unfractionated heparin.  Further recommendations will be based on the results of the transthoracic echocardiogram and repeat troponin.  I will defer to the internal medicine team evaluation of her noncardiac pleuritic chest pain.    Oscar Jalloh MD    Primary Care Physician   Zachary Sky    Reason for Consult   Reason for consult: I was asked by internal medicine to evaluate this patient for chest pain.    History of Present Illness   Leighnan Regan is a 87 year old female who presents with right-sided chest pain which is worsened by deep inspiration.  The patient has a history of permanent pacemaker implantation in [facility] in 2006, essential hypertension and dementia.  Much of the history is obtained from the chart and daughter.  The patient is on sure when her chest pain began but states it hasn't is continuous.  She denies any other associated chest tightness or heaviness.  The pain on the right side of her chest is worsened when she takes in a deep inspiration.  A CT scan was performed showing no pulmonary abnormalities all of the patient does have an elevated white count.  I did review the CT myself and there  is no evidence for coronary artery calcification.  Her initial troponin has come back elevated at 0.184.  Her ECG shows a ventricularly paced rhythm.  A transthoracic echogram is currently pending.    Patient Active Problem List   Diagnosis     Elevated troponin       Past Medical History   I have reviewed this patient's medical history and updated it with pertinent information if needed.   Past Medical History:   Diagnosis Date     Constipation      Dementia      HTN (hypertension)      Macular degeneration      Pacemaker      Seborrheic dermatitis        Past Surgical History   I have reviewed this patient's surgical history and updated it with pertinent information if needed.  Past Surgical History:   Procedure Laterality Date     HYSTERECTOMY         Prior to Admission Medications   Prior to Admission Medications   Prescriptions Last Dose Informant Patient Reported? Taking?   Calcium Carb-Cholecalciferol 600-800 MG-UNIT TABS 7/24/2018 at Unknown time  Yes Yes   Sig: Take 1 tablet by mouth daily   Multiple Vitamins-Minerals (PRESERVISION AREDS 2) CAPS 7/24/2018 at Unknown time  Yes Yes   Sig: Take 1 capsule by mouth daily   acetaminophen (TYLENOL) 325 MG tablet  at prn  Yes Yes   Sig: Take 650 mg by mouth every 8 hours as needed for mild pain   aspirin 81 MG chewable tablet 7/24/2018 at Unknown time  Yes Yes   Sig: Take 81 mg by mouth daily   meclizine (ANTIVERT) 25 MG tablet  at prn  Yes Yes   Sig: Take 25 mg by mouth 3 times daily as needed for dizziness   metoprolol succinate (TOPROL-XL) 50 MG 24 hr tablet 7/24/2018 at clarifying which bb with NH  Yes Yes   Sig: Take 50 mg by mouth daily   mupirocin (BACTROBAN) 2 % cream  at prn  Yes Yes   Sig: Apply topically 2 times daily as needed Apply to scalp   senna-docusate (SENOKOT-S;PERICOLACE) 8.6-50 MG per tablet  at prn  Yes Yes   Sig: Take 1 tablet by mouth daily as needed for constipation      Facility-Administered Medications: None     Current  "Facility-Administered Medications   Medication Dose Route Frequency     [START ON 7/26/2018] aspirin  81 mg Oral Daily     atorvastatin  10 mg Oral QPM     azithromycin  500 mg Intravenous Q24H     famotidine  20 mg Oral BID     ipratropium - albuterol 0.5 mg/2.5 mg/3 mL  3 mL Nebulization 4x daily     ipratropium - albuterol 0.5 mg/2.5 mg/3 mL  3 mL Nebulization Q4H     metoprolol succinate  50 mg Oral Daily     morphine (PF)  2 mg Intravenous Once     sodium chloride (PF)  3 mL Intracatheter Q8H     Current Facility-Administered Medications   Medication Last Rate     HEParin 800 Units/hr (07/25/18 0922)     - MEDICATION INSTRUCTIONS -       sodium chloride 75 mL/hr at 07/25/18 1014     Allergies   Allergies   Allergen Reactions     Codeine        Social History    reports that she has never smoked. She does not have any smokeless tobacco history on file. She reports that she does not drink alcohol or use illicit drugs.    Family History   Family History   Problem Relation Age of Onset     Cerebrovascular Disease Mother      Diabetes Mother        Review of Systems   The comprehensive 10 point Review of Systems is negative other than noted in the HPI or here.     Physical Exam   Vital Signs with Ranges  Temp:  [99.3  F (37.4  C)] 99.3  F (37.4  C)  Pulse:  [65] 65  Heart Rate:  [] 92  Resp:  [8-27] 24  BP: (125-161)/() 144/60  SpO2:  [92 %-98 %] 92 %  Wt Readings from Last 4 Encounters:   07/25/18 82.1 kg (181 lb)            Vitals: /60 (BP Location: Left arm)  Pulse 65  Temp 99.3  F (37.4  C) (Oral)  Resp 24  Ht 1.651 m (5' 5\")  Wt 82.1 kg (181 lb)  SpO2 92%  BMI 30.12 kg/m2    Elderly female who appears stated age in no acute distress  No JVD  No scleral icterus  Heart is distant but regular  Lungs are clear  Abdomen is soft and nontender  Lower extremities edema  She is alert but only oriented to herself  Musculoskeletal exam does not reveal any gross M normalities of her major " joints  Dermatologic exam does not reveal any significant rashes or ecchymoses unexposed areas of skin      Recent Labs  Lab 07/25/18  0413   TROPI 0.184*         Recent Labs  Lab 07/25/18  0413   WBC 15.7*   HGB 14.4   MCV 94         POTASSIUM 3.5   CHLORIDE 103   CO2 25   BUN 17   CR 1.09*   GFRESTIMATED 47*   GFRESTBLACK 57*   ANIONGAP 7   ALESHA 8.4*   *   ALBUMIN 3.0*   PROTTOTAL 7.5   BILITOTAL 1.1   ALKPHOS 89   ALT 18   AST 20   LIPASE 73   TROPI 0.184*     No results for input(s): CHOL, HDL, LDL, TRIG, CHOLHDLRATIO in the last 98580 hours.    Recent Labs  Lab 07/25/18  0413   WBC 15.7*   HGB 14.4   HCT 44.3   MCV 94          Recent Labs  Lab 07/25/18  0425   PHV 7.45*   PO2V 34   PCO2V 38*   HCO3V 26     No results for input(s): NTBNPI, NTBNP in the last 168 hours.  No results for input(s): DD in the last 168 hours.  No results for input(s): SED, CRP in the last 168 hours.    Recent Labs  Lab 07/25/18  0413        No results for input(s): TSH in the last 168 hours.  No results for input(s): COLOR, APPEARANCE, URINEGLC, URINEBILI, URINEKETONE, SG, UBLD, URINEPH, PROTEIN, UROBILINOGEN, NITRITE, LEUKEST, RBCU, WBCU in the last 168 hours.    Imaging:  Recent Results (from the past 48 hour(s))   XR Chest 2 Views    Narrative    CHEST 2 VIEWS  7/25/2018 4:46 AM     HISTORY: Cough. Right-sided chest pain.    COMPARISON: None.    FINDINGS: No convincing pulmonary opacities. Borderline cardiomegaly.  Atherosclerotic calcification in the thoracic aorta. Left anterior  chest wall cardiac device with lead tips in the right atrium and right  ventricle.      Impression    IMPRESSION: No convincing evidence of active cardiopulmonary disease.    SATISH HERNANDEZ MD   CT Chest Pulmonary Embolism w Contrast    Narrative    CT CHEST WITH CONTRAST  7/25/2018 6:31 AM     HISTORY: Dyspnea.    COMPARISON: None.    TECHNIQUE: Following the uneventful administration of 68 mL Isovue-370  intravenous  contrast, helical sections were acquired through the lungs  according to the pulmonary embolism protocol. Coronal reconstructions  were generated. Radiation dose for this scan was reduced using  automated exposure control, adjustment of the mA and/or kV according  to the patient's size, or iterative reconstruction technique.    FINDINGS: No visualized pulmonary embolus. The thoracic aorta is  normal in caliber without dissection. Atherosclerotic calcification in  the thoracic aorta. Left anterior chest wall cardiac device with lead  tips in the right atrium and right ventricle.    A few linear and band-like opacities in bilateral lung bases, most  likely representing atelectasis and/or scarring. The lungs are  otherwise clear. No pleural or pericardial effusion. No enlarged lymph  nodes in the chest. Heterogeneous thyroid gland.    Scan through the upper abdomen is unremarkable.      Impression    IMPRESSION:  1. No visualized pulmonary embolus.  2. No evidence of active pulmonary disease.    SATISH HERNANDEZ MD       Echo:  No results found for this or any previous visit (from the past 4320 hour(s)).

## 2018-07-25 NOTE — ED NOTES
Signed out by Dr Lemons at change of shift.  In brief, patient has history of dementia and presented with cough and chest pain.  Work-up included negative CT PA, positive troponin, paced EKG.  Patient is on heparin.  DNR/DNI but family interested in other interventions including possible angiogram if indicated medically.    No further needs in ED prior to admission.           Radha Mills MD  07/26/18 3983

## 2018-07-25 NOTE — IP AVS SNAPSHOT
` `     [place] [address] MEDICAL SURGICAL: [phone]            Medication Administration Report for Leighann Regan as of 07/26/18 2972   Legend:    Given Hold Not Given Due Canceled Entry Other Actions    Time Time (Time) Time  Time-Action       Inactive    Active    Linked        Medications 07/20/18 07/21/18 07/22/18 07/23/18 07/24/18 07/25/18 07/26/18    acetaminophen (TYLENOL) tablet 650 mg  Dose: 650 mg  Freq: EVERY 4 HOURS PRN Route: PO  PRN Reason: mild pain  Start: 07/25/18 1000   Admin Instructions: Alternate ibuprofen (if ordered) with acetaminophen.  Maximum acetaminophen dose from all sources = 75 mg/kg/day not to exceed 4 grams/day.    Admin. Amount: 2 tablet (2 × 325 mg tablet)  Dispense Loc:  [facility] MS3[facility]               albuterol neb solution 2.5 mg  Dose: 2.5 mg  Freq: EVERY 2 HOURS PRN Route: NEBULIZATION  PRN Reasons: wheezing,shortness of breath / dyspnea  Start: 07/25/18 1000   Admin. Amount: 2.5 mg = 3 mL Conc: 2.5 mg/3 mL  Dispense Loc:  [facility]  Volume: 3 mL               aspirin chewable tablet 81 mg  Dose: 81 mg  Freq: DAILY Route: PO  Start: 07/26/18 0900   Admin. Amount: 1 tablet (1 × 81 mg tablet)  Last Admin: 07/26/18 0820  Dispense Loc:  [facility]           0820 (81 mg)-Given           atorvastatin (LIPITOR) tablet 10 mg  Dose: 10 mg  Freq: EVERY EVENING Route: PO  Start: 07/25/18 2000   Admin. Amount: 1 tablet (1 × 10 mg tablet)  Last Admin: 07/25/18 2013  Dispense Loc:  [facility]          2013 (10 mg)-Given        [ ] 2000           azithromycin (ZITHROMAX) 500 mg in sodium chloride 0.9 % 250 mL intermittent infusion  Dose: 500 mg  Freq: EVERY 24 HOURS Route: IV  Indications of Use: COMMUNITY ACQUIRED PNEUMONIA  Last Dose: 500 mg (07/26/18 0700)  Start: 07/25/18 0409   Admin. Amount: 500 mg  Last Admin: 07/26/18 0700  Dispense Loc:  Main Pharmacy  Infused Over: 1 Hours  Volume: 250 mL   Mixture Administration Information:   Medication Type Amount   azithromycin 500 MG SOLR  "Medications 500 mg   sodium chloride 0.9 % SOLN Base 250 mL                  0515 (500 mg)-New Bag       0713-Stopped        0556 (500 mg)-New Bag       0611-Stopped [C]       0700 (500 mg)-Restarted           benzonatate (TESSALON) capsule 100 mg  Dose: 100 mg  Freq: 3 TIMES DAILY PRN Route: PO  PRN Reason: cough  Start: 07/25/18 1417   Admin. Amount: 1 capsule (1 × 100 mg capsule)  Last Admin: 07/26/18 0913  Dispense Loc: [facility]S [facility]          1611 (100 mg)-Given        0913 (100 mg)-Given           famotidine (PEPCID) tablet 20 mg  Dose: 20 mg  Freq: DAILY Route: PO  Start: 07/26/18 0900   Admin Instructions: Dose adjusted per renal dosing policy to once daily.  Estimated CrCl = 38.5 mL/min.    Admin. Amount: 1 tablet (1 × 20 mg tablet)  Last Admin: 07/26/18 0820  Dispense Loc: [facility]           0820 (20 mg)-Given           guaiFENesin-dextromethorphan (ROBITUSSIN DM) 100-10 MG/5ML syrup 5 mL  Dose: 5 mL  Freq: EVERY 4 HOURS PRN Route: PO  PRN Reason: cough  Start: 07/25/18 1417   Admin. Amount: 5 mL  Dispense Loc: [facility]  Volume: 5 mL               lidocaine (LMX4) kit  Freq: EVERY 1 HOUR PRN Route: Top  PRN Reason: pain  PRN Comment: with VAD insertion or accessing implanted port.  Start: 07/25/18 1000   Admin Instructions: Do NOT give if patient has a history of allergy to any local anesthetic or any \"sonu\" product.   Apply 30 minutes prior to VAD insertion or port access.  MAX Dose:  2.5 g (  of 5 g tube)    Dispense Loc: [facility]               lidocaine 1 % 1 mL  Dose: 1 mL  Freq: EVERY 1 HOUR PRN Route: OTHER  PRN Comment: mild pain with VAD insertion or accessing implanted port  Start: 07/25/18 1000   Admin Instructions: Do NOT give if patient has a history of allergy to any local anesthetic or any \"sonu\" product. MAX dose 1 mL subcutaneous OR intradermal in divided doses.    Admin. Amount: 1 mL  Dispense Loc: [facility] Floor Stock  Volume: 2 mL               melatonin tablet 1 mg  Dose: 1 mg  Freq: " AT BEDTIME PRN Route: PO  PRN Reason: sleep  Start: 18 1000   Admin Instructions: Do not give unless at least 6 hours of uninterrupted sleep is expected.    Admin. Amount: 1 tablet (1 × 1 mg tablet)  Dispense Loc: [facility]S MS3[facility]               metoprolol succinate (TOPROL-XL) 24 hr tablet 50 mg  Dose: 50 mg  Freq: DAILY Route: PO  Start: 18 1015   Admin Instructions: DO NOT CRUSH. Tablet may be split in half along score line.  Hold for SBP<120, HR<60    Admin. Amount: 1 tablet (1 × 50 mg tablet)  Last Admin: 18 0820  Dispense Loc: [facility]S MS3[facility]          1229 (50 mg)-Given        0820 (50 mg)-Given           morphine (PF) injection 1 mg  Dose: 1 mg  Freq: EVERY 2 HOURS PRN Route: IV  PRN Reason: other  PRN Comment: pain control or improvement in physical function. Hold dose for analgesic side effects.  Start: 18 1000   Admin Instructions: Notify provider to assess for uncontrolled pain or analgesic side effects. Hold while on PCA or with regular IV opioid dosing  For ordered doses up to 15 mg give IV Push undiluted over 4-5 minutes.    Admin. Amount: 1 mg  Dispense Loc: [facility] MS3[facility]               morphine (PF) injection 2 mg  Dose: 2 mg  Freq: ONCE Route: IV  Start: 18 0818   Admin Instructions: For ordered doses up to 15 mg give IV Push undiluted over 4-5 minutes.    Admin. Amount: 2 mg  Dispense Loc: [facility] MS3[facility]  Infused Over: 4-5 Minutes  Administrations Remainin                      naloxone (NARCAN) injection 0.1-0.4 mg  Dose: 0.1-0.4 mg  Freq: EVERY 2 MIN PRN Route: IV  PRN Reason: opioid reversal  Start: 18 1000   Admin Instructions: For respiratory rate LESS than or EQUAL to 8.  Partial reversal dose:  0.1 mg titrated q 2 minutes for Analgesia Side Effects Monitoring Sedation Level of 3 (frequently drowsy, arousable, drifts to sleep during conversation).Full reversal dose:  0.4 mg bolus for Analgesia Side Effects Monitoring Sedation Level of 4 (somnolent, minimal or no  response to stimulation).  For ordered doses up to 2mg give IVP. Give each 0.4mg over 15 seconds in emergency situations. For non-emergent situations further dilute in 9mL of NS to facilitate titration of response.    Admin. Amount: 0.1-0.4 mg = 0.25-1 mL Conc: 0.4 mg/mL  Dispense Loc: [facility]S MS3E  Volume: 1 mL               ondansetron (ZOFRAN-ODT) ODT tab 4 mg  Dose: 4 mg  Freq: EVERY 6 HOURS PRN Route: PO  PRN Reasons: nausea,vomiting  Start: 07/25/18 1000   Admin Instructions: This is Step 1 of nausea and vomiting management.  If nausea not resolved in 15 minutes, go to Step 2 prochlorperazine (COMPAZINE). Do not push through foil backing. Peel back foil and gently remove. Place on tongue immediately. Administration with liquid unnecessary  With dry hands, peel back foil backing and gently remove tablet; do not push oral disintegrating tablet through foil backing; administer immediately on tongue and oral disintegrating tablet dissolves in seconds; then swallow with saliva; liquid not required.    Admin. Amount: 1 tablet (1 × 4 mg tablet)  Dispense Loc: [facility] ADS MS3E              Or  ondansetron (ZOFRAN) injection 4 mg  Dose: 4 mg  Freq: EVERY 6 HOURS PRN Route: IV  PRN Reasons: nausea,vomiting  Start: 07/25/18 1000   Admin Instructions: This is Step 1 of nausea and vomiting management.  If nausea not resolved in 15 minutes, go to Step 2 prochlorperazine (COMPAZINE).  Irritant. For ordered doses up to 4 mg, give IV Push undiluted over 2-5 minutes.    Admin. Amount: 4 mg = 2 mL Conc: 4 mg/2 mL  Dispense Loc: [facility]S MS3[facility]  Infused Over: 2-5 Minutes  Volume: 2 mL               Patient is already receiving anticoagulation with heparin, enoxaparin (LOVENOX), warfarin (COUMADIN)  or other anticoagulant medication  Freq: CONTINUOUS PRN Route: XX  Start: 07/25/18 1000   Dispense Loc: Non Rx dispense               polyethylene glycol (MIRALAX/GLYCOLAX) Packet 17 g  Dose: 17 g  Freq: DAILY PRN Route: PO  PRN Reason:  constipation  Start: 07/25/18 1000   Admin Instructions: Give in 8oz of  water, juice, or soda. Hold for loose stools.  This is the second step of a three step constipation treatment.  1 Packet = 17 grams. Mixed prescribed dose in 8 ounces of water. Follow with 8 oz. of water.    Admin. Amount: 17 g  Dispense Loc:  ADS MS3E               Reason ACE/ARB/ARNI order not selected  Freq: DOES NOT GO TO MAR Route: OTHER  PRN Reason: other  Start: 07/26/18 1146   Order specific questions:  Reason not prescribed: Diastolic Heart Failure ejection fraction > 40%     Dispense Loc:  Main Pharmacy  POC: Discharge-NON Med               senna-docusate (SENOKOT-S;PERICOLACE) 8.6-50 MG per tablet 1 tablet  Dose: 1 tablet  Freq: 2 TIMES DAILY PRN Route: PO  PRN Reason: constipation  Start: 07/25/18 1000   Admin Instructions: If no bowel movement in 24 hours, increase to 2 tablets PO.  Hold for loose stools.  This is the first step of a three step constipation treatment.    Admin. Amount: 1 tablet  Dispense Loc:  ADS MS3[facility]              Or  senna-docusate (SENOKOT-S;PERICOLACE) 8.6-50 MG per tablet 2 tablet  Dose: 2 tablet  Freq: 2 TIMES DAILY PRN Route: PO  PRN Reason: constipation  Start: 07/25/18 1000   Admin Instructions: Hold for loose stools.  This is the first step of a three step constipation treatment.    Admin. Amount: 2 tablet  Dispense Loc:  ADS MS3E               sodium chloride (PF) 0.9% PF flush 3 mL  Dose: 3 mL  Freq: EVERY 8 HOURS Route: IK  Start: 07/25/18 1015   Admin Instructions: And Q1H PRN, to lock peripheral IV dormant line.    Admin. Amount: 3 mL  Last Admin: 07/26/18 0313  Dispense Loc: [facility] Floor Stock  Volume: 4 mL          (1022)-Not Given       1827 (3 mL)-Given        0313 (3 mL)-Given       (0721)-Not Given       [ ] 1600           sodium chloride (PF) 0.9% PF flush 3 mL  Dose: 3 mL  Freq: EVERY 1 HOUR PRN Route: IK  PRN Reason: line flush  PRN Comment: for peripheral IV flush post IV meds  Start:  18 1000   Admin. Amount: 3 mL  Last Admin: 18 0604  Dispense Loc: [facility] Floor Stock  Volume: 4 mL           0604 (3 mL)-Given          Completed Medications  Medications 18         Dose: 100 mL  Freq: ONCE Route: IV  Last Dose: Stopped (18)  Start: 18   End: 18   Admin. Amount: 100 mL  Last Admin: 1831  Dispense Loc: [facility] ADS ERA  Administrations Remainin  Volume: 100 mL          0631 (82 mL)-New Bag [C]       0633-Stopped              Dose: 1,000 mL  Freq: ONCE Route: IV  Last Dose: Stopped (18 0550)  Start: 18 0408   End: 18 0550   Admin. Amount: 1,000 mL  Last Admin: 18 0420  Dispense Loc: [facility] ER  Infused Over: 1 Hours  Administrations Remainin  Volume: 1,000 mL          0420 (1,000 mL)-New Bag       0550-Stopped              Dose: 324 mg  Freq: ONCE Route: PO  Start: 18 0538   End: 18 0554   Admin. Amount: 4 tablet (4 × 81 mg tablet)  Last Admin: 1854  Dispense Loc: [facility] ADS ERA  Administrations Remainin          0554 (324 mg)-Given              Dose: 1 g  Freq: ONCE Route: IV  Indications of Use: COMMUNITY ACQUIRED PNEUMONIA  Last Dose: Stopped (18)  Start: 18 040   End: 18   Admin. Amount: 1 g  Last Admin: 18 0458  Dispense Loc: [facility]S ERA  Infused Over: 15-30 Minutes  Administrations Remainin  Volume: 10 mL          0458 (1 g)-New Bag       05-Stopped              Dose: 4,000 Units  Freq: ONCE Route: IV  Start: 18 0839   End: 18 0921   Admin Instructions: Nurse to administer dose from existing infusion. If no infusion bag or syringe for this order is available, contact pharmacist to re-enter medication order.      Admin. Amount: 4,000 Units  Last Admin: 18 0921  Dispense Loc:  Main Pharmacy  Administrations Remainin          0921 (4,000 Units)-Given              Dose: 500 mL  Freq:  ONCE Route: IV  Start: 18   End: 1831   Admin. Amount: 500 mL  Last Admin: 18  Dispense Loc: [facility] Floor Stock  Administrations Remainin  Volume: 500 mL          0631 (68 mL)-Given [C]              Dose: 3 mL  Freq: ONCE Route: NEBULIZATION  Start: 18 0409   End: 18 0502   Admin. Amount: 3 mL  Last Admin: 18 0502  Dispense Loc: [facility] ADS ERA  Administrations Remainin  Volume: 3 mL          0419 ( )-Given       0502 (3 mL)-Given              Dose: 125 mg  Freq: ONCE Route: IV  Start: 18 0541   End: 18 0553   Admin Instructions: Give Doses 125mg and less IV Push over 2-3 minutes - reconstitute with 2 mL of bacteriostatic water if no diluent is provided. Doses greater than 125 mg need to be in at least 50 mL IVPB.    Admin. Amount: 125 mg = 2 mL Conc: 125 mg/2 mL  Last Admin: 18 0553  Dispense Loc: [facility]S ERA  Administrations Remainin  Volume: 2 mL          0553 (125 mg)-Given           Discontinued Medications  Medications 18         Dose: 2.5 mg  Freq: EVERY 20 MIN PRN Route: NEBULIZATION  PRN Reason: wheezing  Start: 18 0540   End: 18 1206   Admin. Amount: 2.5 mg = 3 mL Conc: 2.5 mg/3 mL  Last Admin: 18 0559  Dispense Loc: [facility] ADS MS3S  Volume: 3 mL          0559 (2.5 mg)-Given       1206-Med Discontinued          Dose: 20 mg  Freq: 2 TIMES DAILY Route: PO  Start: 18 1015   End: 18 1353   Admin. Amount: 1 tablet (1 × 20 mg tablet)  Last Admin: 18 1228  Dispense Loc: [facility] ADS MS3S          1228 (20 mg)-Given       1353-Med Discontinued          Rate: 8 mL/hr Dose: 800 Units/hr  Freq: CONTINUOUS Route: IV  Last Dose: Stopped (18 1550)  Start: 18 0839   End: 18 1523   Order specific questions:  Initial Set-up verified by Margaret Kaur RN, co-signed by Gurjit Vickers RN     Last Admin: 18 0922  Dispense Loc: [facility]  Pharmacy  Volume: 250 mL          0922 (800 Units/hr)-New Bag       1523-Med Discontinued  1550-Stopped              Dose: 3 mL  Freq: EVERY 4 HOURS Route: NEBULIZATION  Start: 07/25/18 1015   End: 07/25/18 1206   Admin. Amount: 3 mL  Dispense Loc: [facility] ADS MS3S  Volume: 3 mL                 1206-Med Discontinued          Dose: 3 mL  Freq: 4 TIMES DAILY Route: NEBULIZATION  Start: 07/25/18 0800   End: 07/25/18 1206   Admin. Amount: 3 mL  Dispense Loc: [facility] ADS MS3S  Volume: 3 mL                        1206-Med Discontinued          Rate: 75 mL/hr   Freq: CONTINUOUS Route: IV  Start: 07/25/18 1015   End: 07/25/18 1008          1008-Med Discontinued          Rate: 75 mL/hr   Freq: CONTINUOUS Route: IV  Start: 07/25/18 1015   End: 07/25/18 1325   Last Admin: 07/25/18 1014  Dispense Loc: [facility] Floor Stock  Volume: 1,000 mL          1014 ( )-New Bag       1325-Med Discontinued          Rate: 125 mL/hr Dose: 1000 mL  Freq: CONTINUOUS Route: IV  Start: 07/25/18 0409   End: 07/25/18 1008   Admin Instructions: Administer after the bolus.    Admin. Amount: 1,000 mL  Dispense Loc: [facility] Floor Stock  Volume: 1,000 mL          1008-Med Discontinued           Medications 07/20/18 07/21/18 07/22/18 07/23/18 07/24/18 07/25/18 07/26/18

## 2018-07-25 NOTE — IP AVS SNAPSHOT
"` `           [place] [address] MEDICAL SURGICAL: [phone]                                              INTERAGENCY TRANSFER FORM - NURSING   2018                    Hospital Admission Date: 2018  ADELAIDA HANSON   : 1/15/1931  Sex: Female        Attending Provider: Abhishek Chavira MD     Allergies:  Codeine, Ibuprofen, Latex, Sulfa Drugs, Tramadol    Infection:  None   Service:  GENERAL MEDI    Ht:  1.651 m (5' 5\")   Wt:  83.1 kg (183 lb 3.2 oz)   Admission Wt:  82.3 kg (181 lb 6.4 oz)    BMI:  30.49 kg/m 2   BSA:  1.95 m 2            Patient PCP Information     Provider PCP Type    Zachary Sky MD General      Current Code Status     Date Active Code Status Order ID Comments User Context       2018 10:00 AM Full Code [id number]  Brianda Watkins PA-C Inpatient       Code Status History     Date Active Date Inactive Code Status Order ID Comments User Context    This patient has a current code status but no historical code status.      Advance Directives        Scanned docmt in ACP Activity?           No scanned doc        Hospital Problems as of 2018              Priority Class Noted POA    Elevated troponin Medium  2018 Yes      Non-Hospital Problems as of 2018     None      Immunizations     None         END      ASSESSMENT     Discharge Profile Flowsheet     EXPECTED DISCHARGE     Inspection under devices  Full 18 0930    Expected Discharge Date  18 (1.0 d > Obs >>from [facility] in [place]) 18 0810   Skin WDL  ex;characteristics 18 0930    GASTROINTESTINAL (ADULT,PEDIATRIC,OB)     Skin Temperature  warm 18 0930    GI WDL  WDL 18 0930   Skin Moisture  dry 18 0930    All Quadrants Bowel Sounds  audible and normoactive 18 0930   Skin Elasticity  quick return to original state 18 0930    Last Bowel Movement  18 0930   Skin Integrity  bruise(s) 18 0930    Passing flatus  yes 18 " "0930   Full except areas not inspected   Hip, left;Hip, right;Buttock, left;Buttock, right;Sacrum;Coccyx 07/26/18 0930    COMMUNICATION ASSESSMENT     SAFETY      Patient's communication style  spoken language (English or Bilingual) 07/25/18 0340   Safety WDL  ex;safety factors 07/26/18 0930    SKIN     All Alarms  alarm(s) activated and audible 07/26/18 0930    Inspection of bony prominences  Full except (identify areas not inspected) 07/26/18 0930   Safety Factors  ID band on;upper side rails raised x 2;wheels locked;call light in reach;bed in low position 07/26/18 0930                 Assessment WDL (Within Defined Limits) Definitions           Safety WDL     Effective: 09/28/15    Row Information: <b>WDL Definition:</b> Bed in low position, wheels locked; call light in reach; upper side rails up x 2; ID band on<br> <font color=\"gray\"><i>Item=AS safety wdl>>List=AS safety wdl>>Version=F14</i></font>      Skin WDL     Effective: 09/28/15    Row Information: <b>WDL Definition:</b> Warm; dry; intact; elastic; without discoloration; pressure points without redness<br> <font color=\"gray\"><i>Item=AS skin wdl>>List=AS skin wdl>>Version=F14</i></font>      Vitals     Vital Signs Flowsheet     VITAL SIGNS     Height  1.651 m (5' 5\") 07/25/18 0849    Temp  97.1  F (36.2  C) 07/26/18 1147   Height Method  Actual 07/25/18 0835    Temp src  Oral 07/26/18 1147   Weight  83.1 kg (183 lb 3.2 oz) 07/26/18 0554    Resp  18 07/26/18 1147   Weight Method  Standing scale 07/26/18 0554    Pulse  65 07/25/18 1002   BSA (Calculated - sq m)  1.94 07/25/18 0849    Heart Rate  65 07/26/18 1147   BMI (Calculated)  30.18 07/25/18 0849    Pulse/Heart Rate Source  Monitor 07/26/18 0355   POSITIONING      BP  146/65 07/26/18 1147   Body Position  independently positioning 07/26/18 0930    BP Location  Right arm 07/26/18 1147   Head of Bed (HOB)  HOB at 20-30 degrees 07/26/18 0807    OXYGEN THERAPY     Chair  Upright in chair 07/26/18 0930    " SpO2  95 % 07/26/18 1147   Positioning/Transfer Devices  pillows;in use 07/26/18 0930    O2 Device  None (Room air) 07/26/18 1147   DAILY CARE      PACEMAKER     Activity Management  activity adjusted per tolerance;activity encouraged 07/26/18 0930    Pacemaker  Permanent 07/26/18 0930   Activity Assistance Provided  assistance, 1 person 07/26/18 0930    PAIN/COMFORT     Assistive Device Utilized  walker;gait belt 07/26/18 0930    Patient Currently in Pain  denies 07/26/18 1147   Additional Documentation  Activity Device Assistance (Row) 07/26/18 0021    Preferred Pain Scale  number (Numeric Rating Pain Scale) 07/26/18 1147   ECG      Patient's Stated Pain Goal  No pain 07/26/18 1147   Equipment  electrodes changed;telemetry batteries changed 07/25/18 1109    0-10 Pain Scale  0 07/26/18 1147   EKG MONITORING      Word Pain Scale  0 07/26/18 0315   Cardiac Rhythm  NSR;Ventricular paced 07/25/18 0432    FACES Pain Rating  2-->hurts little bit 07/25/18 1016   HARRY COMA SCALE      Pain Location  Chest 07/25/18 1016   Best Eye Response  4-->(E4) spontaneous 07/26/18 0930    Pain Orientation  Right 07/25/18 1016   Best Motor Response  6-->(M6) obeys commands 07/26/18 0930    Nonverbal Indicators Of Pain  rubbing 07/25/18 1016   Best Verbal Response  4-->(V4) confused 07/26/18 0930    Pain Intervention(s)  Repositioned 07/25/18 1016   [place] Coma Scale Score  14 07/26/18 0930    HEIGHT AND WEIGHT                   Patient Lines/Drains/Airways Status    Active LINES/DRAINS/AIRWAYS     None            Patient Lines/Drains/Airways Status    Active PICC/CVC     None            Intake/Output Detail Report     Date Intake     Output    Shift P.O. I.V. IV Piggyback Total Total       Noc 07/24/18 2300 - 07/25/18 0659 -- -- -- -- -- 0    Day 07/25/18 0700 - 07/25/18 1459 400 -- -- 400 -- 400    Lori 07/25/18 1500 - 07/25/18 2259 400 -- -- 400 -- 400    Noc 07/25/18 2300 - 07/26/18 0659 -- 6 -- 6 -- 6    Day 07/26/18 0700 -  07/26/18 1459 -- -- -- -- -- 0      Last Void/BM       Most Recent Value    Urine Occurrence 1 at 07/26/2018 0542    Stool Occurrence 1 at 07/26/2018 0313      Case Management/Discharge Planning     Case Management/Discharge Planning Flowsheet     EXPECTED DISCHARGE     OBSERVATION: Is there reason to believe there has been maltreatment of a vulnerable adult (ie. Physical/Sexual/Emotional abuse, self neglect, lack of adequate food, shelter, medical care, or financial exploitation)?  no 07/25/18 0344    Expected Discharge Date  07/26/18 (1.0 d > Obs >>from [facility] in [place]) 07/26/18 0810   OTHER      ABUSE RISK SCREEN     Are you depressed or being treated for depression?  No 07/25/18 1252    QUESTION TO PATIENT:  Has a member of your family or a partner(now or in the past) intimidated, hurt, manipulated, or controlled you in any way?  no 07/25/18 0344   HOMICIDE RISK      QUESTION TO PATIENT: Do you feel safe going back to the place where you are living?  yes 07/25/18 0344   Feels Like Hurting Others  no 07/25/18 0344

## 2018-07-26 VITALS
TEMPERATURE: 97.1 F | SYSTOLIC BLOOD PRESSURE: 146 MMHG | DIASTOLIC BLOOD PRESSURE: 65 MMHG | RESPIRATION RATE: 18 BRPM | BODY MASS INDEX: 30.52 KG/M2 | HEART RATE: 65 BPM | WEIGHT: 183.2 LBS | OXYGEN SATURATION: 95 % | HEIGHT: 65 IN

## 2018-07-26 LAB
ALBUMIN UR-MCNC: 30 MG/DL
ANION GAP SERPL CALCULATED.3IONS-SCNC: 7 MMOL/L (ref 3–14)
APPEARANCE UR: ABNORMAL
BACTERIA #/AREA URNS HPF: ABNORMAL /HPF
BILIRUB UR QL STRIP: NEGATIVE
BUN SERPL-MCNC: 23 MG/DL (ref 7–30)
CALCIUM SERPL-MCNC: 8 MG/DL (ref 8.5–10.1)
CHLORIDE SERPL-SCNC: 110 MMOL/L (ref 94–109)
CO2 SERPL-SCNC: 24 MMOL/L (ref 20–32)
COLOR UR AUTO: YELLOW
CREAT SERPL-MCNC: 1.01 MG/DL (ref 0.52–1.04)
DEPRECATED S PYO AG THROAT QL EIA: NORMAL
ERYTHROCYTE [DISTWIDTH] IN BLOOD BY AUTOMATED COUNT: 13.4 % (ref 10–15)
GFR SERPL CREATININE-BSD FRML MDRD: 52 ML/MIN/1.7M2
GLUCOSE SERPL-MCNC: 111 MG/DL (ref 70–99)
GLUCOSE UR STRIP-MCNC: NEGATIVE MG/DL
HCT VFR BLD AUTO: 38 % (ref 35–47)
HGB BLD-MCNC: 12.4 G/DL (ref 11.7–15.7)
HGB UR QL STRIP: ABNORMAL
KETONES UR STRIP-MCNC: NEGATIVE MG/DL
LEUKOCYTE ESTERASE UR QL STRIP: NEGATIVE
LMWH PPP CHRO-ACNC: <0.1 IU/ML
MCH RBC QN AUTO: 31.2 PG (ref 26.5–33)
MCHC RBC AUTO-ENTMCNC: 32.6 G/DL (ref 31.5–36.5)
MCV RBC AUTO: 96 FL (ref 78–100)
MUCOUS THREADS #/AREA URNS LPF: PRESENT /LPF
NITRATE UR QL: NEGATIVE
PH UR STRIP: 6 PH (ref 5–7)
PLATELET # BLD AUTO: 216 10E9/L (ref 150–450)
POTASSIUM SERPL-SCNC: 3.8 MMOL/L (ref 3.4–5.3)
RBC # BLD AUTO: 3.97 10E12/L (ref 3.8–5.2)
RBC #/AREA URNS AUTO: 18 /HPF (ref 0–2)
SODIUM SERPL-SCNC: 141 MMOL/L (ref 133–144)
SOURCE: ABNORMAL
SP GR UR STRIP: 1.03 (ref 1–1.03)
SPECIMEN SOURCE: NORMAL
SQUAMOUS #/AREA URNS AUTO: 4 /HPF (ref 0–1)
TROPONIN I SERPL-MCNC: 0.12 UG/L (ref 0–0.04)
UROBILINOGEN UR STRIP-MCNC: 0 MG/DL (ref 0–2)
WBC # BLD AUTO: 14.5 10E9/L (ref 4–11)
WBC #/AREA URNS AUTO: 2 /HPF (ref 0–5)

## 2018-07-26 PROCEDURE — A9270 NON-COVERED ITEM OR SERVICE: HCPCS | Mod: GY | Performed by: INTERNAL MEDICINE

## 2018-07-26 PROCEDURE — 84484 ASSAY OF TROPONIN QUANT: CPT | Performed by: PHYSICIAN ASSISTANT

## 2018-07-26 PROCEDURE — 25000128 H RX IP 250 OP 636: Performed by: EMERGENCY MEDICINE

## 2018-07-26 PROCEDURE — 25000132 ZZH RX MED GY IP 250 OP 250 PS 637: Mod: GY | Performed by: PHYSICIAN ASSISTANT

## 2018-07-26 PROCEDURE — 25000132 ZZH RX MED GY IP 250 OP 250 PS 637: Mod: GY | Performed by: INTERNAL MEDICINE

## 2018-07-26 PROCEDURE — 87081 CULTURE SCREEN ONLY: CPT | Performed by: INTERNAL MEDICINE

## 2018-07-26 PROCEDURE — G0378 HOSPITAL OBSERVATION PER HR: HCPCS

## 2018-07-26 PROCEDURE — 85027 COMPLETE CBC AUTOMATED: CPT | Performed by: PHYSICIAN ASSISTANT

## 2018-07-26 PROCEDURE — 99217 ZZC OBSERVATION CARE DISCHARGE: CPT | Performed by: INTERNAL MEDICINE

## 2018-07-26 PROCEDURE — A9270 NON-COVERED ITEM OR SERVICE: HCPCS | Mod: GY | Performed by: PHYSICIAN ASSISTANT

## 2018-07-26 PROCEDURE — 81001 URINALYSIS AUTO W/SCOPE: CPT | Performed by: PHYSICIAN ASSISTANT

## 2018-07-26 PROCEDURE — 85520 HEPARIN ASSAY: CPT | Performed by: PHYSICIAN ASSISTANT

## 2018-07-26 PROCEDURE — 87086 URINE CULTURE/COLONY COUNT: CPT | Performed by: INTERNAL MEDICINE

## 2018-07-26 PROCEDURE — 36415 COLL VENOUS BLD VENIPUNCTURE: CPT | Performed by: PHYSICIAN ASSISTANT

## 2018-07-26 PROCEDURE — 87880 STREP A ASSAY W/OPTIC: CPT | Performed by: INTERNAL MEDICINE

## 2018-07-26 PROCEDURE — 80048 BASIC METABOLIC PNL TOTAL CA: CPT | Performed by: PHYSICIAN ASSISTANT

## 2018-07-26 PROCEDURE — 96376 TX/PRO/DX INJ SAME DRUG ADON: CPT

## 2018-07-26 RX ORDER — AZITHROMYCIN 250 MG/1
250 TABLET, FILM COATED ORAL DAILY
Qty: 2 TABLET | Refills: 0 | Status: SHIPPED | OUTPATIENT
Start: 2018-07-26 | End: 2018-07-28

## 2018-07-26 RX ORDER — ATORVASTATIN CALCIUM 10 MG/1
10 TABLET, FILM COATED ORAL EVERY EVENING
Qty: 30 TABLET | Refills: 0 | Status: ON HOLD | OUTPATIENT
Start: 2018-07-26 | End: 2021-01-03

## 2018-07-26 RX ORDER — GUAIFENESIN/DEXTROMETHORPHAN 100-10MG/5
5 SYRUP ORAL EVERY 4 HOURS PRN
Qty: 560 ML | Refills: 0 | Status: ON HOLD | OUTPATIENT
Start: 2018-07-26 | End: 2021-01-03

## 2018-07-26 RX ORDER — CEFDINIR 300 MG/1
300 CAPSULE ORAL 2 TIMES DAILY
Qty: 10 CAPSULE | Refills: 0 | Status: SHIPPED | OUTPATIENT
Start: 2018-07-26 | End: 2018-07-31

## 2018-07-26 RX ORDER — BENZONATATE 100 MG/1
100 CAPSULE ORAL 3 TIMES DAILY PRN
Qty: 42 CAPSULE | Refills: 0 | Status: ON HOLD | OUTPATIENT
Start: 2018-07-26 | End: 2021-01-03

## 2018-07-26 RX ORDER — FUROSEMIDE 20 MG
20 TABLET ORAL DAILY
Qty: 5 TABLET | Refills: 0 | Status: ON HOLD | OUTPATIENT
Start: 2018-07-26 | End: 2021-01-03

## 2018-07-26 RX ADMIN — AZITHROMYCIN MONOHYDRATE 500 MG: 500 INJECTION, POWDER, LYOPHILIZED, FOR SOLUTION INTRAVENOUS at 05:56

## 2018-07-26 RX ADMIN — METOPROLOL SUCCINATE 50 MG: 50 TABLET, EXTENDED RELEASE ORAL at 08:20

## 2018-07-26 RX ADMIN — BENZONATATE 100 MG: 100 CAPSULE ORAL at 09:13

## 2018-07-26 RX ADMIN — ASPIRIN 81 MG 81 MG: 81 TABLET ORAL at 08:20

## 2018-07-26 RX ADMIN — FAMOTIDINE 20 MG: 20 TABLET, FILM COATED ORAL at 08:20

## 2018-07-26 NOTE — PROGRESS NOTES
CM was updated by bedside RN that pt is discharging back to her independent living facility, [facility]. Contacted [facility] ([phone]). LM to return call re: discharge needs. [facility] returned call. They need faxed copies of new medications as they do her medication administration. Originals are in dc packet for facility. She also would like a med list. Faxed prescriptions, med list & dc orders to [phone].     Patient's daughter in room. She will transport pt back to [facility] once discharged.     CM will continue to follow patient until discharge for any additional needs.     Sarahi Julio RN, BSN, CTS  [facility]  [phone]

## 2018-07-26 NOTE — PLAN OF CARE
"Problem: Cardiac: ACS (Acute Coronary Syndrome) (Adult)  Goal: Signs and Symptoms of Listed Potential Problems Will be Absent, Minimized or Managed (Cardiac: ACS)  Signs and symptoms of listed potential problems will be absent, minimized or managed by discharge/transition of care (reference Cardiac: ACS (Acute Coronary Syndrome) (Adult) CPG).   Outcome: No Change  PRIMARY DIAGNOSIS: \"Cough\"  OUTPATIENT/OBSERVATION GOALS TO BE MET BEFORE DISCHARGE:  1. ADLs back to baseline: LESTER, pt confused      2. Activity and level of assistance: A1 with walker     3. Pain status: Pain free.    4. Return to near baseline physical activity: LESTER, pt confused      Discharge Planner Nurse   Safe discharge environment identified: Yes  Barriers to discharge: Yes       Entered by: Rabia Parker 07/26/2018 1:38 AM     Please review provider order for any additional goals.   Nurse to notify provider when observation goals have been met and patient is ready for discharge.      "

## 2018-07-26 NOTE — PLAN OF CARE
Problem: Patient Care Overview  Goal: Plan of Care/Patient Progress Review  Problem: Patient Care Overview  Goal: Plan of Care/Patient Progress Review  Outcome: Improving  PRIMARY DIAGNOSIS: CHEST PAIN  OUTPATIENT/OBSERVATION GOALS TO BE MET BEFORE DISCHARGE:  1. Ruled out acute coronary syndrome (negative or stable Troponin):  In process, last 0.123  2. Pain Status: Pain free.  3. Appropriate provocative testing performed: N/A  - Stress Test Procedure: n/a  - Interpretation of cardiac rhythm per telemetry tech: AV paced, 1st deg AVB     4. Cleared by Consultants (if applicable):Yes, cards signed off  5. Return to near baseline physical activity: Yes, x1, walker  Discharge Planner Nurse   Safe discharge environment identified: Yes  Barriers to discharge: No       Entered by: Alessandra Haskins 07/25/2018 6:44 PM  Please review provider order for any additional goals.   Nurse to notify provider when observation goals have been met and patient is ready for discharge.     Disoriented to sit, time, & place. VSS, afebrile. LS dim on RA, dry cough. OCAMPO. Heparin drip discontinued. Denies pain, CP. C/O discomfort on right side when coughing. Taking tessalon perles PRN. Trop 0.123. Need urine sample.

## 2018-07-26 NOTE — PLAN OF CARE
"Problem: Cardiac: ACS (Acute Coronary Syndrome) (Adult)  Goal: Signs and Symptoms of Listed Potential Problems Will be Absent, Minimized or Managed (Cardiac: ACS)  Signs and symptoms of listed potential problems will be absent, minimized or managed by discharge/transition of care (reference Cardiac: ACS (Acute Coronary Syndrome) (Adult) CPG).   Outcome: No Change  PRIMARY DIAGNOSIS: \"Cough\"  OUTPATIENT/OBSERVATION GOALS TO BE MET BEFORE DISCHARGE:  1. ADLs back to baseline: Yes    2. Activity and level of assistance: A1 with walker    3. Pain status: Pain free.    4. Return to near baseline physical activity: Yes      Discharge Planner Nurse   Safe discharge environment identified: Yes  Barriers to discharge: Yes       Entered by: Rabia Parker 07/26/2018 6:12 AM     Please review provider order for any additional goals.   Nurse to notify provider when observation goals have been met and patient is ready for discharge.    ** Pt orientation varied throughout the shift, currently A&O x 3, disoriented to situation, Tele 100% A-paced, HR 60, Trops trending down, A1 with walker, discharge plan 1-2 days, continue with POC        "

## 2018-07-26 NOTE — PHARMACY - DISCHARGE MEDICATION RECONCILIATION
Discharge medication review for this patient is complete.   Patient was not counseled or given any education materials as discharged to LTC, TCU facility, Memory Care Facility, etc.  See EPIC for allergy information, prior to admission medications and immunization status.   Pharmacist assisted with medication reconciliation of discharge medications with PTA medications.    MD was contacted with any questions/concerns: None    Additional medication history information: None    Discharge Medication List     Review of your medicines      START taking       Dose / Directions    atorvastatin 10 MG tablet   Commonly known as:  LIPITOR   Used for:  Acute chest pain, Troponin level elevated        Dose:  10 mg   Take 1 tablet (10 mg) by mouth every evening   Quantity:  30 tablet   Refills:  0       azithromycin 250 MG tablet   Commonly known as:  ZITHROMAX   Used for:  Acute bronchitis, unspecified organism        Dose:  250 mg   Take 1 tablet (250 mg) by mouth daily for 2 days   Quantity:  2 tablet   Refills:  0       benzonatate 100 MG capsule   Commonly known as:  TESSALON   Used for:  Acute bronchitis, unspecified organism        Dose:  100 mg   Take 1 capsule (100 mg) by mouth 3 times daily as needed for cough   Quantity:  42 capsule   Refills:  0       cefdinir 300 MG capsule   Commonly known as:  OMNICEF   Used for:  Acute bronchitis, unspecified organism, Dysuria        Dose:  300 mg   Take 1 capsule (300 mg) by mouth 2 times daily for 5 days   Quantity:  10 capsule   Refills:  0       furosemide 20 MG tablet   Commonly known as:  LASIX   Used for:  Diastolic dysfunction        Dose:  20 mg   Take 1 tablet (20 mg) by mouth daily for 5 days   Quantity:  5 tablet   Refills:  0       guaiFENesin-dextromethorphan 100-10 MG/5ML syrup   Commonly known as:  ROBITUSSIN DM   Used for:  Acute bronchitis, unspecified organism        Dose:  5 mL   Take 5 mLs by mouth every 4 hours as needed for cough   Quantity:  560 mL    Refills:  0         CONTINUE these medicines which have NOT CHANGED       Dose / Directions    acetaminophen 325 MG tablet   Commonly known as:  TYLENOL        Dose:  650 mg   Take 650 mg by mouth every 8 hours as needed for mild pain   Refills:  0       aspirin 81 MG chewable tablet        Dose:  81 mg   Take 81 mg by mouth daily   Refills:  0       Calcium Carb-Cholecalciferol 600-800 MG-UNIT Tabs        Dose:  1 tablet   Take 1 tablet by mouth daily   Refills:  0       meclizine 25 MG tablet   Commonly known as:  ANTIVERT        Dose:  25 mg   Take 25 mg by mouth 3 times daily as needed for dizziness   Refills:  0       metoprolol succinate 50 MG 24 hr tablet   Commonly known as:  TOPROL-XL        Dose:  50 mg   Take 50 mg by mouth daily   Refills:  0       mupirocin 2 % cream   Commonly known as:  BACTROBAN        Apply topically 2 times daily as needed Apply to scalp   Refills:  0       PRESERVISION AREDS 2 Caps        Dose:  1 capsule   Take 1 capsule by mouth daily   Refills:  0       senna-docusate 8.6-50 MG per tablet   Commonly known as:  SENOKOT-S;PERICOLACE        Dose:  1 tablet   Take 1 tablet by mouth daily as needed for constipation   Refills:  0            Where to get your medicines      Some of these will need a paper prescription and others can be bought over the counter. Ask your nurse if you have questions.     Bring a paper prescription for each of these medications      atorvastatin 10 MG tablet     azithromycin 250 MG tablet     benzonatate 100 MG capsule     cefdinir 300 MG capsule     furosemide 20 MG tablet     guaiFENesin-dextromethorphan 100-10 MG/5ML syrup             Humberto Yi, GoyoD.

## 2018-07-26 NOTE — PLAN OF CARE
Problem: Patient Care Overview  Goal: Plan of Care/Patient Progress Review  Outcome: Improving  PRIMARY DIAGNOSIS: CHEST PAIN  OUTPATIENT/OBSERVATION GOALS TO BE MET BEFORE DISCHARGE:  1. Ruled out acute coronary syndrome (negative or stable Troponin):  In process, last 0.123  2. Pain Status: Pain free.  3. Appropriate provocative testing performed: N/A  - Stress Test Procedure: n/a  - Interpretation of cardiac rhythm per telemetry tech: AV paced, 1st deg AVB    4. Cleared by Consultants (if applicable):Yes, cards signed off  5. Return to near baseline physical activity: Yes, x1, walker  Discharge Planner Nurse   Safe discharge environment identified: Yes  Barriers to discharge: No       Entered by: Alessandra Haskins 07/25/2018 6:44 PM     Please review provider order for any additional goals.   Nurse to notify provider when observation goals have been met and patient is ready for discharge.    Disoriented to sit, time, & place. VSS, afebrile. LS dim on RA, dry cough. OCAMPO. Heparin drip discontinued. Denies pain, CP. C/O discomfort on right side when coughing. Taking tessalon perles PRN. Trop 0.123.

## 2018-07-26 NOTE — PLAN OF CARE
Problem: Patient Care Overview  Goal: Plan of Care/Patient Progress Review  Outcome: Improving  PRIMARY DIAGNOSIS: CHEST PAIN  OUTPATIENT/OBSERVATION GOALS TO BE MET BEFORE DISCHARGE:  1. Ruled out acute coronary syndrome (negative or stable Troponin):  Yes  2. Pain Status: Pain free.  3. Appropriate provocative testing performed: Yes  - Stress Test Procedure: Echo  - Interpretation of cardiac rhythm per telemetry tech: 100% V Paced    4. Cleared by Consultants (if applicable):Yes  5. Return to near baseline physical activity: Yes  Discharge Planner Nurse   Safe discharge environment identified: Yes  Barriers to discharge: Yes, strep test results.        Entered by: Sissy Raya 07/26/2018 11:18 AM     Alert, orientated at times. Forgetful.VSS on RA. Up with 1 assist. IV Zithromax for possible infection. Strep test  Negative. Denies chest pain and SOB. Troponin trending down/stablized. C/o cough, Tessalon pearls X1. Discharge today back to independent living with daughter as caregiver. [facility] contacted per RN. Discharge instructions provided, all questions answered. Medications and packet faxed to facility.

## 2018-07-27 NOTE — DISCHARGE SUMMARY
Admit Date:     07/25/2018   Discharge Date:     07/26/2018      PRIMARY CARE PHYSICIAN:  Zachary Sky.      DISCHARGE DISPOSITION:  Assisted living facility.      DISCHARGE CONDITION:  Stable.      PHYSICAL EXAMINATION:   NEUROLOGIC:  On exam, she is awake, alert, comfortable appearing.  She has cognitive impairment, but according to family, she appears to be at her baseline.   LUNGS:  Clear to auscultation without any major wheezing or crackles.   CARDIAC:  Exam reveals regular rhythm, normal S1 and S2.   ABDOMEN:  Soft, nontender.   EXTREMITIES:  There is about trace to +1 edema in the extremities.      DISCHARGE DIAGNOSES:   1.  Episode of chest pain felt to be noncardiac and possibly due to a frequent cough and acute bronchitis.   2.  Acute pharyngitis.  Streptococcus throat rapid screen was negative.   3.  Elevated troponin.  Seen by cardiology this admission.  Plan for medical management.   4.  Hypertension.   5.  History of bradycardia, status post pacemaker placement.   6.  Dementia.   7.  Mild volume overload.      CONSULTATIONS:  Cardiology.     PENDING LABORATORY TESTS:  Final blood cultures and strep cultures.      HISTORY OF PRESENT ILLNESS:  Please refer to the H and P for details.  This is an 87-year-old female who presents to the hospital with concerns for cough.      HOSPITAL COURSE:  The patient has a history of hypertension, status post pacemaker placement, dementia.  Resides at assisted living facility.  She came to the hospital with concerns for ongoing cough and chest pain.      The patient was found to have a mild elevated troponin at the time of admission.  This trended down.  She was seen by cardiology in consultation.  Her ECHO showed preserved EF without any regional wall motion abnormalities.  Her pain was felt to be noncardiac in nature and probably caused more by the cough.  However, she does have elevated troponin.  She was seen by cardiology and discussed with the patient and family  about doing a stress test for further stratification, although family opted for medical management at this time.  She was started on Lipitor in addition to her aspirin and beta blocker.  She did have a mildly elevated BNP, although no significant signs of volume overload on exam or imaging other than some mild edema in the extremities that is chronic.  She will be discharged home on a low dose of furosemide for the next 5 days with a trial and see how she responds.  I have advised the family to discuss with the primary provider if this should be continued longer term.      The patient does have ongoing symptoms of cough and she has got some sore throat as well.  Her rapid strep screen was negative.  Imaging was negative for any acute pneumonia.  Her CT chest was also negative for any acute PE or any active disease.  Given concerns for ongoing symptoms, mildly elevated procalcitonin of 0.19, we will treat this with a course of antibiotics for suspected bronchitis.      Overall, the patient is doing much better at this time.  According to family, her mentation and mobility seem to be at her baseline and feels comfortable taking her back to assisted living facility.  Daughter mentioned that she will be staying with them for the next couple of days to give some additional support.  I have discussed the plan of care with the patient and her daughter at bedside in great detail and all their questions were answered extensively.      DISCHARGE DIET:  Regular diet.        1.  Followup with the primary care provider in 1 week for hospital followup.   2.  Complete a course of antibiotics.   3.  A short trial off furosemide for the next 5 days to see if that helps with her symptoms.  Discuss with primary care provider if a low dose Lasix e.g 10 mg daily  should be continued long-term.      Total time spent in face-to-face contact with the patient and coordinating discharge was more than 30 minutes.       Discharge Medication  List as of 7/26/2018  1:54 PM      START taking these medications    Details   atorvastatin (LIPITOR) 10 MG tablet Take 1 tablet (10 mg) by mouth every evening, Disp-30 tablet, R-0, Local PrintFuture refills by PCP Dr. Zachary Sky with phone number [phone].      azithromycin (ZITHROMAX) 250 MG tablet Take 1 tablet (250 mg) by mouth daily for 2 days, Disp-2 tablet, R-0, Local Print      benzonatate (TESSALON) 100 MG capsule Take 1 capsule (100 mg) by mouth 3 times daily as needed for cough, Disp-42 capsule, R-0, Local Print      cefdinir (OMNICEF) 300 MG capsule Take 1 capsule (300 mg) by mouth 2 times daily for 5 days, Disp-10 capsule, R-0, Local Print      furosemide (LASIX) 20 MG tablet Take 1 tablet (20 mg) by mouth daily for 5 days, Disp-5 tablet, R-0, Local Print      guaiFENesin-dextromethorphan (ROBITUSSIN DM) 100-10 MG/5ML syrup Take 5 mLs by mouth every 4 hours as needed for cough, Disp-560 mL, R-0, Local Print         CONTINUE these medications which have NOT CHANGED    Details   acetaminophen (TYLENOL) 325 MG tablet Take 650 mg by mouth every 8 hours as needed for mild pain, Historical      aspirin 81 MG chewable tablet Take 81 mg by mouth daily, Historical      Calcium Carb-Cholecalciferol 600-800 MG-UNIT TABS Take 1 tablet by mouth daily, Historical      meclizine (ANTIVERT) 25 MG tablet Take 25 mg by mouth 3 times daily as needed for dizziness, Historical      metoprolol succinate (TOPROL-XL) 50 MG 24 hr tablet Take 50 mg by mouth daily, Historical      Multiple Vitamins-Minerals (PRESERVISION AREDS 2) CAPS Take 1 capsule by mouth daily, Historical      mupirocin (BACTROBAN) 2 % cream Apply topically 2 times daily as needed Apply to scalpHistorical      senna-docusate (SENOKOT-S;PERICOLACE) 8.6-50 MG per tablet Take 1 tablet by mouth daily as needed for constipation, Historical             Allergies   Allergen Reactions     Codeine      Ibuprofen      Latex      Sulfa Drugs      Tramadol         Results for orders placed or performed during the hospital encounter of 07/25/18   XR Chest 2 Views    Narrative    CHEST 2 VIEWS  7/25/2018 4:46 AM     HISTORY: Cough. Right-sided chest pain.    COMPARISON: None.    FINDINGS: No convincing pulmonary opacities. Borderline cardiomegaly.  Atherosclerotic calcification in the thoracic aorta. Left anterior  chest wall cardiac device with lead tips in the right atrium and right  ventricle.      Impression    IMPRESSION: No convincing evidence of active cardiopulmonary disease.    SATISH HERNANDEZ MD   CT Chest Pulmonary Embolism w Contrast    Narrative    CT CHEST WITH CONTRAST  7/25/2018 6:31 AM     HISTORY: Dyspnea.    COMPARISON: None.    TECHNIQUE: Following the uneventful administration of 68 mL Isovue-370  intravenous contrast, helical sections were acquired through the lungs  according to the pulmonary embolism protocol. Coronal reconstructions  were generated. Radiation dose for this scan was reduced using  automated exposure control, adjustment of the mA and/or kV according  to the patient's size, or iterative reconstruction technique.    FINDINGS: No visualized pulmonary embolus. The thoracic aorta is  normal in caliber without dissection. Atherosclerotic calcification in  the thoracic aorta. Left anterior chest wall cardiac device with lead  tips in the right atrium and right ventricle.    A few linear and band-like opacities in bilateral lung bases, most  likely representing atelectasis and/or scarring. The lungs are  otherwise clear. No pleural or pericardial effusion. No enlarged lymph  nodes in the chest. Heterogeneous thyroid gland.    Scan through the upper abdomen is unremarkable.      Impression    IMPRESSION:  1. No visualized pulmonary embolus.  2. No evidence of active pulmonary disease.    MD DANIEL MAJOR MD             D: 07/26/2018   T: 07/26/2018   MT: SULEMAN      Name:     ADELAIDA HANSON   MRN:       [id number]        Account:        [id number]   :      01/15/1931           Admit Date:     2018                                  Discharge Date: 2018      Document: [id number]

## 2018-07-28 LAB
BACTERIA SPEC CULT: NORMAL
Lab: NORMAL
SPECIMEN SOURCE: NORMAL

## 2018-07-31 ENCOUNTER — DOCUMENTATION ONLY (OUTPATIENT)
Dept: OTHER | Facility: CLINIC | Age: 83
End: 2018-07-31

## 2018-07-31 LAB
BACTERIA SPEC CULT: NO GROWTH
Lab: NORMAL
SPECIMEN SOURCE: NORMAL

## 2021-01-02 ENCOUNTER — HOSPITAL ENCOUNTER (INPATIENT)
Facility: CLINIC | Age: 86
LOS: 4 days | Discharge: HOSPICE | DRG: 418 | End: 2021-01-09
Attending: NURSE PRACTITIONER | Admitting: INTERNAL MEDICINE
Payer: COMMERCIAL

## 2021-01-02 DIAGNOSIS — K81.0 ACUTE CHOLECYSTITIS: Primary | ICD-10-CM

## 2021-01-02 DIAGNOSIS — R11.2 NAUSEA WITH VOMITING: ICD-10-CM

## 2021-01-02 DIAGNOSIS — N39.0 URINARY TRACT INFECTION: ICD-10-CM

## 2021-01-02 DIAGNOSIS — Z51.5 HOSPICE CARE PATIENT: ICD-10-CM

## 2021-01-02 DIAGNOSIS — F03.90 DEMENTIA (H): ICD-10-CM

## 2021-01-02 LAB
ALBUMIN SERPL-MCNC: 3.1 G/DL (ref 3.4–5)
ALBUMIN UR-MCNC: 50 MG/DL
ALP SERPL-CCNC: 92 U/L (ref 40–150)
ALT SERPL W P-5'-P-CCNC: 26 U/L (ref 0–50)
ANION GAP SERPL CALCULATED.3IONS-SCNC: 11 MMOL/L (ref 3–14)
APPEARANCE UR: ABNORMAL
AST SERPL W P-5'-P-CCNC: 33 U/L (ref 0–45)
BACTERIA #/AREA URNS HPF: ABNORMAL /HPF
BASOPHILS # BLD AUTO: 0.1 10E9/L (ref 0–0.2)
BASOPHILS NFR BLD AUTO: 0.4 %
BILIRUB SERPL-MCNC: 0.9 MG/DL (ref 0.2–1.3)
BILIRUB UR QL STRIP: NEGATIVE
BUN SERPL-MCNC: 22 MG/DL (ref 7–30)
CALCIUM SERPL-MCNC: 9.1 MG/DL (ref 8.5–10.1)
CHLORIDE SERPL-SCNC: 106 MMOL/L (ref 94–109)
CO2 SERPL-SCNC: 24 MMOL/L (ref 20–32)
COLOR UR AUTO: YELLOW
CREAT SERPL-MCNC: 0.86 MG/DL (ref 0.52–1.04)
DIFFERENTIAL METHOD BLD: ABNORMAL
EOSINOPHIL # BLD AUTO: 0 10E9/L (ref 0–0.7)
EOSINOPHIL NFR BLD AUTO: 0 %
ERYTHROCYTE [DISTWIDTH] IN BLOOD BY AUTOMATED COUNT: 14.6 % (ref 10–15)
GFR SERPL CREATININE-BSD FRML MDRD: 59 ML/MIN/{1.73_M2}
GLUCOSE SERPL-MCNC: 102 MG/DL (ref 70–99)
GLUCOSE UR STRIP-MCNC: NEGATIVE MG/DL
HCT VFR BLD AUTO: 43.8 % (ref 35–47)
HGB BLD-MCNC: 14.3 G/DL (ref 11.7–15.7)
HGB UR QL STRIP: NEGATIVE
IMM GRANULOCYTES # BLD: 0.1 10E9/L (ref 0–0.4)
IMM GRANULOCYTES NFR BLD: 0.5 %
KETONES UR STRIP-MCNC: >150 MG/DL
LACTATE BLD-SCNC: 1.6 MMOL/L (ref 0.7–2)
LEUKOCYTE ESTERASE UR QL STRIP: ABNORMAL
LYMPHOCYTES # BLD AUTO: 1.2 10E9/L (ref 0.8–5.3)
LYMPHOCYTES NFR BLD AUTO: 9.8 %
MCH RBC QN AUTO: 33.6 PG (ref 26.5–33)
MCHC RBC AUTO-ENTMCNC: 32.6 G/DL (ref 31.5–36.5)
MCV RBC AUTO: 103 FL (ref 78–100)
MONOCYTES # BLD AUTO: 0.5 10E9/L (ref 0–1.3)
MONOCYTES NFR BLD AUTO: 4.5 %
MUCOUS THREADS #/AREA URNS LPF: PRESENT /LPF
NEUTROPHILS # BLD AUTO: 9.9 10E9/L (ref 1.6–8.3)
NEUTROPHILS NFR BLD AUTO: 84.8 %
NITRATE UR QL: NEGATIVE
NRBC # BLD AUTO: 0 10*3/UL
NRBC BLD AUTO-RTO: 0 /100
PH UR STRIP: 5.5 PH (ref 5–7)
PLATELET # BLD AUTO: 213 10E9/L (ref 150–450)
POTASSIUM SERPL-SCNC: 3.8 MMOL/L (ref 3.4–5.3)
PROT SERPL-MCNC: 6.8 G/DL (ref 6.8–8.8)
RBC # BLD AUTO: 4.26 10E12/L (ref 3.8–5.2)
RBC #/AREA URNS AUTO: 4 /HPF (ref 0–2)
SODIUM SERPL-SCNC: 141 MMOL/L (ref 133–144)
SOURCE: ABNORMAL
SP GR UR STRIP: 1.02 (ref 1–1.03)
SQUAMOUS #/AREA URNS AUTO: 3 /HPF (ref 0–1)
UROBILINOGEN UR STRIP-MCNC: NORMAL MG/DL (ref 0–2)
WBC # BLD AUTO: 11.7 10E9/L (ref 4–11)
WBC #/AREA URNS AUTO: 42 /HPF (ref 0–5)
WBC CLUMPS #/AREA URNS HPF: PRESENT /HPF

## 2021-01-02 PROCEDURE — 99285 EMERGENCY DEPT VISIT HI MDM: CPT | Mod: 25

## 2021-01-02 PROCEDURE — 258N000003 HC RX IP 258 OP 636: Performed by: NURSE PRACTITIONER

## 2021-01-02 PROCEDURE — 84484 ASSAY OF TROPONIN QUANT: CPT | Performed by: EMERGENCY MEDICINE

## 2021-01-02 PROCEDURE — 96361 HYDRATE IV INFUSION ADD-ON: CPT

## 2021-01-02 PROCEDURE — C9803 HOPD COVID-19 SPEC COLLECT: HCPCS

## 2021-01-02 PROCEDURE — 99220 PR INITIAL OBSERVATION CARE,LEVEL III: CPT | Performed by: INTERNAL MEDICINE

## 2021-01-02 PROCEDURE — 96365 THER/PROPH/DIAG IV INF INIT: CPT

## 2021-01-02 PROCEDURE — 83605 ASSAY OF LACTIC ACID: CPT | Performed by: NURSE PRACTITIONER

## 2021-01-02 PROCEDURE — 87635 SARS-COV-2 COVID-19 AMP PRB: CPT | Performed by: NURSE PRACTITIONER

## 2021-01-02 PROCEDURE — 81001 URINALYSIS AUTO W/SCOPE: CPT | Performed by: NURSE PRACTITIONER

## 2021-01-02 PROCEDURE — 80053 COMPREHEN METABOLIC PANEL: CPT | Performed by: EMERGENCY MEDICINE

## 2021-01-02 PROCEDURE — 85025 COMPLETE CBC W/AUTO DIFF WBC: CPT | Performed by: EMERGENCY MEDICINE

## 2021-01-02 PROCEDURE — G0378 HOSPITAL OBSERVATION PER HR: HCPCS

## 2021-01-02 PROCEDURE — 250N000011 HC RX IP 250 OP 636: Performed by: NURSE PRACTITIONER

## 2021-01-02 RX ORDER — CEFTRIAXONE 1 G/1
1 INJECTION, POWDER, FOR SOLUTION INTRAMUSCULAR; INTRAVENOUS ONCE
Status: COMPLETED | OUTPATIENT
Start: 2021-01-02 | End: 2021-01-02

## 2021-01-02 RX ADMIN — CEFTRIAXONE 1 G: 1 INJECTION, POWDER, FOR SOLUTION INTRAMUSCULAR; INTRAVENOUS at 22:10

## 2021-01-02 RX ADMIN — SODIUM CHLORIDE 1000 ML: 9 INJECTION, SOLUTION INTRAVENOUS at 20:57

## 2021-01-02 ASSESSMENT — ENCOUNTER SYMPTOMS
VOMITING: 1
FATIGUE: 1
NAUSEA: 1

## 2021-01-02 NOTE — LETTER
Magalie Valiente RN Case Manager  Inpatient Care Coordination  [facility]   [phone]

## 2021-01-02 NOTE — LETTER
Magalie Valiente RN Case Manager  Inpatient Care Coordination  [facility]   [phone]

## 2021-01-02 NOTE — LETTER
Magalie Valiente RN Case Manager  Inpatient Care Coordination  [facility]   [phone]

## 2021-01-02 NOTE — LETTER
Magalie Valiente RN Case Manager  Inpatient Care Coordination     [phone]  [phone]    Anticipate discharge 1/7 Thursday

## 2021-01-02 NOTE — LETTER
Magalie Valiente RN Case Manager  Inpatient Care Coordination  [facility]   [phone]  [phone]    Anticipate discharge 1/5 to [facility]  Anticipate Home Care RN orders

## 2021-01-02 NOTE — LETTER
Magalie Valiente RN Case Manager  Inpatient Care Coordination  [facility]   [phone]

## 2021-01-02 NOTE — LETTER
Magalie Valiente RN Case Manager  Inpatient Care Coordination  [facility]   [phone]

## 2021-01-02 NOTE — LETTER
Magalie Valiente RN Case Manager  Inpatient Care Coordination  [facility]   [phone]

## 2021-01-03 ENCOUNTER — ANESTHESIA EVENT (OUTPATIENT)
Dept: SURGERY | Facility: CLINIC | Age: 86
DRG: 418 | End: 2021-01-03
Payer: COMMERCIAL

## 2021-01-03 ENCOUNTER — APPOINTMENT (OUTPATIENT)
Dept: ULTRASOUND IMAGING | Facility: CLINIC | Age: 86
DRG: 418 | End: 2021-01-03
Attending: HOSPITALIST
Payer: COMMERCIAL

## 2021-01-03 ENCOUNTER — SURGERY (OUTPATIENT)
Age: 86
End: 2021-01-03
Payer: COMMERCIAL

## 2021-01-03 ENCOUNTER — ANESTHESIA (OUTPATIENT)
Dept: SURGERY | Facility: CLINIC | Age: 86
DRG: 418 | End: 2021-01-03
Payer: COMMERCIAL

## 2021-01-03 ENCOUNTER — APPOINTMENT (OUTPATIENT)
Dept: PHYSICAL THERAPY | Facility: CLINIC | Age: 86
DRG: 418 | End: 2021-01-03
Attending: INTERNAL MEDICINE
Payer: COMMERCIAL

## 2021-01-03 LAB
ANION GAP SERPL CALCULATED.3IONS-SCNC: 7 MMOL/L (ref 3–14)
BUN SERPL-MCNC: 21 MG/DL (ref 7–30)
CALCIUM SERPL-MCNC: 8.1 MG/DL (ref 8.5–10.1)
CHLORIDE SERPL-SCNC: 111 MMOL/L (ref 94–109)
CO2 SERPL-SCNC: 24 MMOL/L (ref 20–32)
CREAT SERPL-MCNC: 0.78 MG/DL (ref 0.52–1.04)
ERYTHROCYTE [DISTWIDTH] IN BLOOD BY AUTOMATED COUNT: 14.6 % (ref 10–15)
GFR SERPL CREATININE-BSD FRML MDRD: 67 ML/MIN/{1.73_M2}
GLUCOSE SERPL-MCNC: 107 MG/DL (ref 70–99)
HCT VFR BLD AUTO: 37.9 % (ref 35–47)
HGB BLD-MCNC: 12.4 G/DL (ref 11.7–15.7)
LABORATORY COMMENT REPORT: NORMAL
MCH RBC QN AUTO: 33.2 PG (ref 26.5–33)
MCHC RBC AUTO-ENTMCNC: 32.7 G/DL (ref 31.5–36.5)
MCV RBC AUTO: 102 FL (ref 78–100)
PLATELET # BLD AUTO: 175 10E9/L (ref 150–450)
POTASSIUM SERPL-SCNC: 3.2 MMOL/L (ref 3.4–5.3)
RBC # BLD AUTO: 3.73 10E12/L (ref 3.8–5.2)
SARS-COV-2 RNA SPEC QL NAA+PROBE: NEGATIVE
SODIUM SERPL-SCNC: 142 MMOL/L (ref 133–144)
SPECIMEN SOURCE: NORMAL
TROPONIN I SERPL-MCNC: <0.015 UG/L (ref 0–0.04)
WBC # BLD AUTO: 9.9 10E9/L (ref 4–11)

## 2021-01-03 PROCEDURE — 93005 ELECTROCARDIOGRAM TRACING: CPT

## 2021-01-03 PROCEDURE — 250N000013 HC RX MED GY IP 250 OP 250 PS 637: Performed by: HOSPITALIST

## 2021-01-03 PROCEDURE — 97530 THERAPEUTIC ACTIVITIES: CPT | Mod: GP

## 2021-01-03 PROCEDURE — 93010 ELECTROCARDIOGRAM REPORT: CPT | Performed by: INTERNAL MEDICINE

## 2021-01-03 PROCEDURE — 250N000011 HC RX IP 250 OP 636: Performed by: SURGERY

## 2021-01-03 PROCEDURE — 258N000003 HC RX IP 258 OP 636: Performed by: INTERNAL MEDICINE

## 2021-01-03 PROCEDURE — 47562 LAPAROSCOPIC CHOLECYSTECTOMY: CPT | Performed by: SURGERY

## 2021-01-03 PROCEDURE — 258N000003 HC RX IP 258 OP 636: Performed by: NURSE ANESTHETIST, CERTIFIED REGISTERED

## 2021-01-03 PROCEDURE — 250N000011 HC RX IP 250 OP 636: Performed by: HOSPITALIST

## 2021-01-03 PROCEDURE — 97161 PT EVAL LOW COMPLEX 20 MIN: CPT | Mod: GP

## 2021-01-03 PROCEDURE — 272N000001 HC OR GENERAL SUPPLY STERILE: Performed by: SURGERY

## 2021-01-03 PROCEDURE — 36415 COLL VENOUS BLD VENIPUNCTURE: CPT | Performed by: INTERNAL MEDICINE

## 2021-01-03 PROCEDURE — 250N000011 HC RX IP 250 OP 636: Performed by: INTERNAL MEDICINE

## 2021-01-03 PROCEDURE — 999N000141 HC STATISTIC PRE-PROCEDURE NURSING ASSESSMENT: Performed by: SURGERY

## 2021-01-03 PROCEDURE — 250N000011 HC RX IP 250 OP 636: Performed by: NURSE ANESTHETIST, CERTIFIED REGISTERED

## 2021-01-03 PROCEDURE — 99203 OFFICE O/P NEW LOW 30 MIN: CPT | Mod: 57 | Performed by: SURGERY

## 2021-01-03 PROCEDURE — 250N000009 HC RX 250: Performed by: NURSE ANESTHETIST, CERTIFIED REGISTERED

## 2021-01-03 PROCEDURE — 96372 THER/PROPH/DIAG INJ SC/IM: CPT | Performed by: HOSPITALIST

## 2021-01-03 PROCEDURE — 80048 BASIC METABOLIC PNL TOTAL CA: CPT | Performed by: INTERNAL MEDICINE

## 2021-01-03 PROCEDURE — 360N000076 HC SURGERY LEVEL 3, PER MIN: Performed by: SURGERY

## 2021-01-03 PROCEDURE — 258N000003 HC RX IP 258 OP 636: Performed by: ANESTHESIOLOGY

## 2021-01-03 PROCEDURE — 370N000017 HC ANESTHESIA TECHNICAL FEE, PER MIN: Performed by: SURGERY

## 2021-01-03 PROCEDURE — 710N000009 HC RECOVERY PHASE 1, LEVEL 1, PER MIN: Performed by: SURGERY

## 2021-01-03 PROCEDURE — 0FT44ZZ RESECTION OF GALLBLADDER, PERCUTANEOUS ENDOSCOPIC APPROACH: ICD-10-PCS | Performed by: SURGERY

## 2021-01-03 PROCEDURE — 250N000013 HC RX MED GY IP 250 OP 250 PS 637: Performed by: SURGERY

## 2021-01-03 PROCEDURE — 96375 TX/PRO/DX INJ NEW DRUG ADDON: CPT

## 2021-01-03 PROCEDURE — 258N000001 HC RX 258: Performed by: SURGERY

## 2021-01-03 PROCEDURE — 76705 ECHO EXAM OF ABDOMEN: CPT

## 2021-01-03 PROCEDURE — 88304 TISSUE EXAM BY PATHOLOGIST: CPT | Mod: TC | Performed by: SURGERY

## 2021-01-03 PROCEDURE — 85027 COMPLETE CBC AUTOMATED: CPT | Performed by: INTERNAL MEDICINE

## 2021-01-03 PROCEDURE — 47562 LAPAROSCOPIC CHOLECYSTECTOMY: CPT | Mod: AS | Performed by: PHYSICIAN ASSISTANT

## 2021-01-03 PROCEDURE — 96376 TX/PRO/DX INJ SAME DRUG ADON: CPT

## 2021-01-03 PROCEDURE — 99226 PR SUBSEQUENT OBSERVATION CARE,LEVEL III: CPT | Performed by: HOSPITALIST

## 2021-01-03 PROCEDURE — 88304 TISSUE EXAM BY PATHOLOGIST: CPT | Mod: 26 | Performed by: PATHOLOGY

## 2021-01-03 PROCEDURE — G0378 HOSPITAL OBSERVATION PER HR: HCPCS

## 2021-01-03 PROCEDURE — 250N000009 HC RX 250: Performed by: SURGERY

## 2021-01-03 PROCEDURE — 999N000157 HC STATISTIC RCP TIME EA 10 MIN

## 2021-01-03 RX ORDER — ONDANSETRON 2 MG/ML
4 INJECTION INTRAMUSCULAR; INTRAVENOUS EVERY 6 HOURS PRN
Status: DISCONTINUED | OUTPATIENT
Start: 2021-01-03 | End: 2021-01-09 | Stop reason: HOSPADM

## 2021-01-03 RX ORDER — HYDROMORPHONE HYDROCHLORIDE 1 MG/ML
.3-.5 INJECTION, SOLUTION INTRAMUSCULAR; INTRAVENOUS; SUBCUTANEOUS EVERY 5 MIN PRN
Status: DISCONTINUED | OUTPATIENT
Start: 2021-01-03 | End: 2021-01-03 | Stop reason: HOSPADM

## 2021-01-03 RX ORDER — FUROSEMIDE 20 MG
20 TABLET ORAL DAILY
Status: ON HOLD | COMMUNITY
End: 2021-01-08

## 2021-01-03 RX ORDER — NALOXONE HYDROCHLORIDE 0.4 MG/ML
0.2 INJECTION, SOLUTION INTRAMUSCULAR; INTRAVENOUS; SUBCUTANEOUS
Status: ACTIVE | OUTPATIENT
Start: 2021-01-03 | End: 2021-01-04

## 2021-01-03 RX ORDER — FENTANYL CITRATE 50 UG/ML
INJECTION, SOLUTION INTRAMUSCULAR; INTRAVENOUS PRN
Status: DISCONTINUED | OUTPATIENT
Start: 2021-01-03 | End: 2021-01-03

## 2021-01-03 RX ORDER — METOPROLOL SUCCINATE 50 MG/1
50 TABLET, EXTENDED RELEASE ORAL DAILY
Status: DISCONTINUED | OUTPATIENT
Start: 2021-01-03 | End: 2021-01-04

## 2021-01-03 RX ORDER — AMOXICILLIN 250 MG
1 CAPSULE ORAL 2 TIMES DAILY
Status: DISCONTINUED | OUTPATIENT
Start: 2021-01-03 | End: 2021-01-08

## 2021-01-03 RX ORDER — ACETAMINOPHEN 500 MG
1000 TABLET ORAL 3 TIMES DAILY
Status: ON HOLD | COMMUNITY
End: 2021-01-08

## 2021-01-03 RX ORDER — BISACODYL 10 MG
10 SUPPOSITORY, RECTAL RECTAL DAILY PRN
Status: ON HOLD | COMMUNITY
End: 2021-01-08

## 2021-01-03 RX ORDER — SODIUM CHLORIDE, SODIUM LACTATE, POTASSIUM CHLORIDE, CALCIUM CHLORIDE 600; 310; 30; 20 MG/100ML; MG/100ML; MG/100ML; MG/100ML
INJECTION, SOLUTION INTRAVENOUS CONTINUOUS
Status: DISCONTINUED | OUTPATIENT
Start: 2021-01-03 | End: 2021-01-03 | Stop reason: HOSPADM

## 2021-01-03 RX ORDER — POLYETHYLENE GLYCOL 3350 17 G/17G
17 POWDER, FOR SOLUTION ORAL DAILY
Status: DISCONTINUED | OUTPATIENT
Start: 2021-01-03 | End: 2021-01-08

## 2021-01-03 RX ORDER — HYDRALAZINE HYDROCHLORIDE 20 MG/ML
2.5-5 INJECTION INTRAMUSCULAR; INTRAVENOUS EVERY 10 MIN PRN
Status: DISCONTINUED | OUTPATIENT
Start: 2021-01-03 | End: 2021-01-03 | Stop reason: HOSPADM

## 2021-01-03 RX ORDER — FENTANYL CITRATE 50 UG/ML
25-50 INJECTION, SOLUTION INTRAMUSCULAR; INTRAVENOUS
Status: DISCONTINUED | OUTPATIENT
Start: 2021-01-03 | End: 2021-01-03 | Stop reason: HOSPADM

## 2021-01-03 RX ORDER — DEXTROSE MONOHYDRATE, SODIUM CHLORIDE, AND POTASSIUM CHLORIDE 50; 1.49; 4.5 G/1000ML; G/1000ML; G/1000ML
INJECTION, SOLUTION INTRAVENOUS CONTINUOUS
Status: DISCONTINUED | OUTPATIENT
Start: 2021-01-03 | End: 2021-01-04

## 2021-01-03 RX ORDER — NYSTATIN 100000 [USP'U]/G
POWDER TOPICAL 2 TIMES DAILY PRN
Status: ON HOLD | COMMUNITY
End: 2021-01-08

## 2021-01-03 RX ORDER — NITROFURANTOIN 25; 75 MG/1; MG/1
100 CAPSULE ORAL 2 TIMES DAILY
Status: ON HOLD | COMMUNITY
Start: 2020-12-31 | End: 2021-01-06

## 2021-01-03 RX ORDER — GLYCOPYRROLATE 0.2 MG/ML
INJECTION, SOLUTION INTRAMUSCULAR; INTRAVENOUS PRN
Status: DISCONTINUED | OUTPATIENT
Start: 2021-01-03 | End: 2021-01-03

## 2021-01-03 RX ORDER — CEFTRIAXONE 1 G/1
1 INJECTION, POWDER, FOR SOLUTION INTRAMUSCULAR; INTRAVENOUS EVERY 24 HOURS
Status: DISCONTINUED | OUTPATIENT
Start: 2021-01-03 | End: 2021-01-04

## 2021-01-03 RX ORDER — CEFAZOLIN SODIUM 1 G/3ML
1 INJECTION, POWDER, FOR SOLUTION INTRAMUSCULAR; INTRAVENOUS SEE ADMIN INSTRUCTIONS
Status: DISCONTINUED | OUTPATIENT
Start: 2021-01-03 | End: 2021-01-03 | Stop reason: HOSPADM

## 2021-01-03 RX ORDER — LIDOCAINE HYDROCHLORIDE 10 MG/ML
INJECTION, SOLUTION INFILTRATION; PERINEURAL PRN
Status: DISCONTINUED | OUTPATIENT
Start: 2021-01-03 | End: 2021-01-03

## 2021-01-03 RX ORDER — ONDANSETRON 4 MG/1
4 TABLET, ORALLY DISINTEGRATING ORAL EVERY 6 HOURS PRN
Status: DISCONTINUED | OUTPATIENT
Start: 2021-01-03 | End: 2021-01-09 | Stop reason: HOSPADM

## 2021-01-03 RX ORDER — ACETAMINOPHEN 650 MG/1
650 SUPPOSITORY RECTAL EVERY 4 HOURS PRN
Status: DISCONTINUED | OUTPATIENT
Start: 2021-01-03 | End: 2021-01-09 | Stop reason: HOSPADM

## 2021-01-03 RX ORDER — GINSENG 100 MG
CAPSULE ORAL 2 TIMES DAILY
Status: ON HOLD | COMMUNITY
End: 2021-01-08

## 2021-01-03 RX ORDER — POTASSIUM CHLORIDE 1500 MG/1
20 TABLET, EXTENDED RELEASE ORAL DAILY
Status: ON HOLD | COMMUNITY
End: 2021-01-08

## 2021-01-03 RX ORDER — OSTOMY SUPPLY
BOX MISCELLANEOUS PRN
COMMUNITY

## 2021-01-03 RX ORDER — AMOXICILLIN 250 MG
2 CAPSULE ORAL 2 TIMES DAILY
Status: DISCONTINUED | OUTPATIENT
Start: 2021-01-03 | End: 2021-01-08

## 2021-01-03 RX ORDER — CEFAZOLIN SODIUM 2 G/100ML
2 INJECTION, SOLUTION INTRAVENOUS
Status: COMPLETED | OUTPATIENT
Start: 2021-01-03 | End: 2021-01-03

## 2021-01-03 RX ORDER — BUPIVACAINE HYDROCHLORIDE 5 MG/ML
INJECTION, SOLUTION EPIDURAL; INTRACAUDAL PRN
Status: DISCONTINUED | OUTPATIENT
Start: 2021-01-03 | End: 2021-01-03 | Stop reason: HOSPADM

## 2021-01-03 RX ORDER — DEXAMETHASONE SODIUM PHOSPHATE 4 MG/ML
INJECTION, SOLUTION INTRA-ARTICULAR; INTRALESIONAL; INTRAMUSCULAR; INTRAVENOUS; SOFT TISSUE PRN
Status: DISCONTINUED | OUTPATIENT
Start: 2021-01-03 | End: 2021-01-03

## 2021-01-03 RX ORDER — NALOXONE HYDROCHLORIDE 0.4 MG/ML
0.4 INJECTION, SOLUTION INTRAMUSCULAR; INTRAVENOUS; SUBCUTANEOUS
Status: ACTIVE | OUTPATIENT
Start: 2021-01-03 | End: 2021-01-04

## 2021-01-03 RX ORDER — HYDROCODONE BITARTRATE AND ACETAMINOPHEN 5; 325 MG/1; MG/1
1 TABLET ORAL EVERY 6 HOURS PRN
Status: DISCONTINUED | OUTPATIENT
Start: 2021-01-03 | End: 2021-01-09 | Stop reason: HOSPADM

## 2021-01-03 RX ORDER — ONDANSETRON 4 MG/1
4 TABLET, ORALLY DISINTEGRATING ORAL EVERY 30 MIN PRN
Status: DISCONTINUED | OUTPATIENT
Start: 2021-01-03 | End: 2021-01-03 | Stop reason: HOSPADM

## 2021-01-03 RX ORDER — ASPIRIN 81 MG/1
81 TABLET, CHEWABLE ORAL DAILY
Status: DISCONTINUED | OUTPATIENT
Start: 2021-01-03 | End: 2021-01-08

## 2021-01-03 RX ORDER — FAMOTIDINE 20 MG/1
20 TABLET, FILM COATED ORAL AT BEDTIME
Status: ON HOLD | COMMUNITY
End: 2021-01-08

## 2021-01-03 RX ORDER — PROCHLORPERAZINE 25 MG
12.5 SUPPOSITORY, RECTAL RECTAL EVERY 12 HOURS PRN
Status: DISCONTINUED | OUTPATIENT
Start: 2021-01-03 | End: 2021-01-09 | Stop reason: HOSPADM

## 2021-01-03 RX ORDER — PROCHLORPERAZINE MALEATE 5 MG
5 TABLET ORAL EVERY 6 HOURS PRN
Status: DISCONTINUED | OUTPATIENT
Start: 2021-01-03 | End: 2021-01-09 | Stop reason: HOSPADM

## 2021-01-03 RX ORDER — ACETAMINOPHEN 325 MG/1
650 TABLET ORAL ONCE
Status: DISCONTINUED | OUTPATIENT
Start: 2021-01-03 | End: 2021-01-04

## 2021-01-03 RX ORDER — LABETALOL 20 MG/4 ML (5 MG/ML) INTRAVENOUS SYRINGE
10
Status: DISCONTINUED | OUTPATIENT
Start: 2021-01-03 | End: 2021-01-03 | Stop reason: HOSPADM

## 2021-01-03 RX ORDER — ONDANSETRON 2 MG/ML
INJECTION INTRAMUSCULAR; INTRAVENOUS PRN
Status: DISCONTINUED | OUTPATIENT
Start: 2021-01-03 | End: 2021-01-03

## 2021-01-03 RX ORDER — POTASSIUM CHLORIDE 1500 MG/1
20 TABLET, EXTENDED RELEASE ORAL ONCE
Status: COMPLETED | OUTPATIENT
Start: 2021-01-03 | End: 2021-01-03

## 2021-01-03 RX ORDER — ACETAMINOPHEN 325 MG/1
650 TABLET ORAL EVERY 4 HOURS PRN
Status: DISCONTINUED | OUTPATIENT
Start: 2021-01-03 | End: 2021-01-04

## 2021-01-03 RX ORDER — NEOSTIGMINE METHYLSULFATE 1 MG/ML
VIAL (ML) INJECTION PRN
Status: DISCONTINUED | OUTPATIENT
Start: 2021-01-03 | End: 2021-01-03

## 2021-01-03 RX ORDER — LOPERAMIDE HYDROCHLORIDE 2 MG/1
2 TABLET ORAL 4 TIMES DAILY PRN
Status: ON HOLD | COMMUNITY
End: 2021-01-08

## 2021-01-03 RX ORDER — ACETAMINOPHEN 650 MG/1
650 SUPPOSITORY RECTAL EVERY 4 HOURS PRN
Status: ON HOLD | COMMUNITY
End: 2021-01-08

## 2021-01-03 RX ORDER — IBUPROFEN 200 MG
CAPSULE ORAL 3 TIMES DAILY PRN
Status: ON HOLD | COMMUNITY
End: 2021-01-08

## 2021-01-03 RX ORDER — LIDOCAINE 40 MG/G
CREAM TOPICAL
Status: DISCONTINUED | OUTPATIENT
Start: 2021-01-03 | End: 2021-01-03 | Stop reason: HOSPADM

## 2021-01-03 RX ORDER — ONDANSETRON 2 MG/ML
4 INJECTION INTRAMUSCULAR; INTRAVENOUS EVERY 30 MIN PRN
Status: DISCONTINUED | OUTPATIENT
Start: 2021-01-03 | End: 2021-01-03 | Stop reason: HOSPADM

## 2021-01-03 RX ORDER — PROPOFOL 10 MG/ML
INJECTION, EMULSION INTRAVENOUS PRN
Status: DISCONTINUED | OUTPATIENT
Start: 2021-01-03 | End: 2021-01-03

## 2021-01-03 RX ADMIN — FENTANYL CITRATE 50 MCG: 50 INJECTION, SOLUTION INTRAMUSCULAR; INTRAVENOUS at 15:48

## 2021-01-03 RX ADMIN — PHENYLEPHRINE HYDROCHLORIDE 100 MCG: 10 INJECTION INTRAVENOUS at 16:00

## 2021-01-03 RX ADMIN — CEFTRIAXONE 1 G: 1 INJECTION, POWDER, FOR SOLUTION INTRAMUSCULAR; INTRAVENOUS at 21:30

## 2021-01-03 RX ADMIN — METOPROLOL SUCCINATE 50 MG: 50 TABLET, EXTENDED RELEASE ORAL at 11:23

## 2021-01-03 RX ADMIN — PROCHLORPERAZINE EDISYLATE 5 MG: 5 INJECTION INTRAMUSCULAR; INTRAVENOUS at 01:59

## 2021-01-03 RX ADMIN — DEXAMETHASONE SODIUM PHOSPHATE 4 MG: 4 INJECTION, SOLUTION INTRA-ARTICULAR; INTRALESIONAL; INTRAMUSCULAR; INTRAVENOUS; SOFT TISSUE at 15:48

## 2021-01-03 RX ADMIN — ENOXAPARIN SODIUM 40 MG: 40 INJECTION SUBCUTANEOUS at 11:15

## 2021-01-03 RX ADMIN — ROCURONIUM BROMIDE 30 MG: 10 INJECTION INTRAVENOUS at 15:48

## 2021-01-03 RX ADMIN — GLYCOPYRROLATE 0.6 MG: 0.2 INJECTION, SOLUTION INTRAMUSCULAR; INTRAVENOUS at 16:38

## 2021-01-03 RX ADMIN — SENNOSIDES AND DOCUSATE SODIUM 1 TABLET: 8.6; 5 TABLET ORAL at 20:09

## 2021-01-03 RX ADMIN — POTASSIUM CHLORIDE, DEXTROSE MONOHYDRATE AND SODIUM CHLORIDE: 150; 5; 450 INJECTION, SOLUTION INTRAVENOUS at 00:41

## 2021-01-03 RX ADMIN — LIDOCAINE HYDROCHLORIDE 25 MG: 10 INJECTION, SOLUTION INFILTRATION; PERINEURAL at 15:48

## 2021-01-03 RX ADMIN — PROPOFOL 80 MG: 10 INJECTION, EMULSION INTRAVENOUS at 15:48

## 2021-01-03 RX ADMIN — POTASSIUM CHLORIDE 20 MEQ: 1500 TABLET, EXTENDED RELEASE ORAL at 11:25

## 2021-01-03 RX ADMIN — ASPIRIN 81 MG 81 MG: 81 TABLET ORAL at 11:24

## 2021-01-03 RX ADMIN — SUGAMMADEX 200 MG: 100 INJECTION, SOLUTION INTRAVENOUS at 17:05

## 2021-01-03 RX ADMIN — ONDANSETRON 4 MG: 2 INJECTION INTRAMUSCULAR; INTRAVENOUS at 00:41

## 2021-01-03 RX ADMIN — CEFAZOLIN SODIUM 2 G: 2 INJECTION, SOLUTION INTRAVENOUS at 15:54

## 2021-01-03 RX ADMIN — Medication 4 MG: at 16:38

## 2021-01-03 RX ADMIN — ONDANSETRON HYDROCHLORIDE 4 MG: 2 INJECTION, SOLUTION INTRAVENOUS at 16:10

## 2021-01-03 RX ADMIN — PROCHLORPERAZINE EDISYLATE 5 MG: 5 INJECTION INTRAMUSCULAR; INTRAVENOUS at 11:15

## 2021-01-03 RX ADMIN — ONDANSETRON 4 MG: 4 TABLET, ORALLY DISINTEGRATING ORAL at 08:16

## 2021-01-03 RX ADMIN — GLYCOPYRROLATE 0.2 MG: 0.2 INJECTION, SOLUTION INTRAMUSCULAR; INTRAVENOUS at 15:48

## 2021-01-03 RX ADMIN — SODIUM CHLORIDE, POTASSIUM CHLORIDE, SODIUM LACTATE AND CALCIUM CHLORIDE: 600; 310; 30; 20 INJECTION, SOLUTION INTRAVENOUS at 15:42

## 2021-01-03 ASSESSMENT — MIFFLIN-ST. JEOR: SCORE: 1251.43

## 2021-01-03 NOTE — PROGRESS NOTES
"PRIMARY DIAGNOSIS: GENERALIZED WEAKNESS-N/V    OUTPATIENT/OBSERVATION GOALS TO BE MET BEFORE DISCHARGE  1. Orthostatic performed: No    2. Tolerating PO medications:  Refusing to eat breakfast    3. Return to near baseline physical activity: No    4. Cleared for discharge by consultants (if involved): No    Discharge Planner Nurse   Safe discharge environment identified: No  Barriers to discharge: Yes       Entered by: Benji Lepe 01/03/2021      BP (!) 147/63 (BP Location: Left arm)   Pulse 63   Temp 96.5  F (35.8  C) (Axillary)   Resp 16   Ht 1.651 m (5' 5\")   Wt 82.6 kg (182 lb)   SpO2 98%   BMI 30.29 kg/m      Pt oriented to self only.  Very [place], has pocket talker at bedside.  Slow to respond to commands and resistant to cares intermittently.  Denies pain.  Taking sips of water and clear liquids with meds but spitting and belching afterwards.  Reporting intermittent nausea, no emesis-getting prn anti-emetics.  IVFS infusing at 100  ml per hour.   Up with A x 1, walker and gait belt. PT and SW pending.  Plan:  Rocephin for possible UTI, abdominal U/S, ADAT.  Continue to monitor and provide supportive cares.  Bed alarm activated for safety.    Please review provider order for any additional goals.   Nurse to notify provider when observation goals have been met and patient is ready for discharge.  "

## 2021-01-03 NOTE — ANESTHESIA POSTPROCEDURE EVALUATION
Patient: Leighann Regan    Procedure(s):  CHOLECYSTECTOMY, LAPAROSCOPIC    Diagnosis:Acute cholecystitis [K81.0]  Diagnosis Additional Information: No value filed.    Anesthesia Type:  General    Note:  Anesthesia Post Evaluation    Patient location during evaluation: PACU  Patient participation: Able to fully participate in evaluation  Level of consciousness: awake  Pain management: adequate  Airway patency: patent  Cardiovascular status: acceptable  Respiratory status: acceptable  Hydration status: acceptable  PONV: none             Last vitals:  Vitals:    01/03/21 1120 01/03/21 1515 01/03/21 1700   BP: (!) 147/63 (!) 149/71 (!) 171/72   Pulse: 63  62   Resp: 16     Temp: 96.5  F (35.8  C) 97  F (36.1  C)    SpO2: 98% 95% 100%         Electronically Signed By: Edgar Druan MD  January 3, 2021  5:25 PM

## 2021-01-03 NOTE — PLAN OF CARE
PRIMARY DIAGNOSIS: GENERALIZED WEAKNESS/ N, V,     OUTPATIENT/OBSERVATION GOALS TO BE MET BEFORE DISCHARGE  1. Orthostatic performed: No    2. Tolerating PO medications: No    3. Return to near baseline physical activity: Yes    4. Cleared for discharge by consultants (if involved): No    Discharge Planner Nurse   Safe discharge environment identified: Yes  Barriers to discharge: Yes- tolerating diet .       Entered by: Bentley Villasenor 01/03/2021 12:53 AM     Please review provider order for any additional goals.   Nurse to notify provider when observation goals have been met and patient is ready for discharge.

## 2021-01-03 NOTE — PHARMACY-ADMISSION MEDICATION HISTORY
Admission medication history interview status for this patient is complete. See [facility] admission navigator for allergy information, prior to admission medications and immunization status.     Medication history interview done via telephone during Covid-19 pandemic, indicate source(s): Caregiver - [facility]  Medication history resources (including written lists, pill bottles, clinic record): SureScripts and med list from [facility]  Pharmacy: [facility] for discharge    Changes made to PTA medication list:  Added: lasix, bacitracin, Vale protect, famotidine, macrobid, potassium chloride, APAP suppository, APAP scheduled, loperamide, bisacodyl suppository, guaifenesin, milk of mag, skin prep wipes, triple antibiotic ointment  Deleted: mupirocin, atorvastatin, benzonatate, Robitussin DM  Changed: none    Actions taken by pharmacist (provider contacted, etc): Verified home med list per [facility].     Additional medication history information:None    Medication reconciliation/reorder completed by provider prior to medication history?  Y   (Y/N)     Prior to Admission medications    Medication Sig Last Dose Taking? Auth Provider   acetaminophen (TYLENOL) 500 MG tablet Take 1,000 mg by mouth 3 times daily 1/2/2021 at 1200 Yes Unknown, Entered By History   aspirin 81 MG chewable tablet Take 81 mg by mouth daily 1/2/2021 at am Yes Unknown, Entered By History   bacitracin 500 UNIT/GM OINT Apply topically 2 times daily To left ear abrasion until healed 1/2/2021 at am Yes Unknown, Entered By History   Vale Protect (EUCERIN) external cream Apply topically 2 times daily as needed for dry skin or other (reddened skin with toileting until healed) 1/2/2021 at am Yes Unknown, Entered By History   Calcium Carb-Cholecalciferol 600-800 MG-UNIT TABS Take 1 tablet by mouth daily 1/2/2021 at am Yes Unknown, Entered By History   famotidine (PEPCID) 20 MG tablet Take 20 mg by mouth At Bedtime 1/1/2021 at pm Yes  Unknown, Entered By History   furosemide (LASIX) 20 MG tablet Take 20 mg by mouth daily 1/2/2021 at am Yes Unknown, Entered By History   metoprolol succinate (TOPROL-XL) 50 MG 24 hr tablet Take 50 mg by mouth daily 1/2/2021 at am Yes Unknown, Entered By History   Multiple Vitamins-Minerals (PRESERVISION AREDS 2) CAPS Take 1 capsule by mouth daily 1/2/2021 at am Yes Unknown, Entered By History   nitroFURantoin macrocrystal-monohydrate (MACROBID) 100 MG capsule Take 100 mg by mouth 2 times daily For 5 days 1/2/2021 at am Yes Unknown, Entered By History   nystatin (MYCOSTATIN) 066589 UNIT/GM external powder Apply topically 2 times daily as needed Apply to affected areas 1/2/2021 at am Yes Unknown, Entered By History   potassium chloride ER (KLOR-CON M) 20 MEQ CR tablet Take 20 mEq by mouth daily 1/2/2021 at am Yes Unknown, Entered By History   acetaminophen (TYLENOL) 325 MG tablet Take 650 mg by mouth every 8 hours as needed for mild pain Unknown at Unknown time  Unknown, Entered By History   acetaminophen (TYLENOL) 650 MG suppository Place 650 mg rectally every 4 hours as needed for fever Unknown at Unknown time  Unknown, Entered By History   bisacodyl (DULCOLAX) 10 MG suppository Place 10 mg rectally daily as needed for constipation Unknown at Unknown time  Unknown, Entered By History   guaiFENesin (ROBITUSSIN) 100 MG/5ML SYRP Take 10 mLs by mouth every 4 hours as needed for cough Unknown at Unknown time  Unknown, Entered By History   loperamide (IMODIUM A-D) 2 MG tablet Take 2 mg by mouth 4 times daily as needed for diarrhea Give 4mg after 1st loose stool then 2mg after each subsequent loose stool. Max 16mg/day. Unknown at Unknown time  Unknown, Entered By History   magnesium hydroxide (MILK OF MAGNESIA) 400 MG/5ML suspension Take 30 mLs by mouth daily as needed for constipation or heartburn Unknown at Unknown time  Unknown, Entered By History   meclizine (ANTIVERT) 25 MG tablet Take 25 mg by mouth 3 times daily as  needed for dizziness Unknown at Unknown time  Unknown, Entered By History   Neomycin-Bacitracin-Polymyxin (TRIPLE ANTIBIOTIC) 3.5-400-5000 OINT ointment Externally apply topically 3 times daily as needed Unknown at Unknown time  Unknown, Entered By History   Ostomy Supplies (SKIN PREP WIPES) MISC as needed (blisters) Unknown at Unknown time  Unknown, Entered By History   senna-docusate (SENOKOT-S;PERICOLACE) 8.6-50 MG per tablet Take 1 tablet by mouth daily as needed for constipation Unknown at Unknown time  Unknown, Entered By History

## 2021-01-03 NOTE — CONSULTS
Consult Date:  01/03/2021      REASON FOR CONSULTATION:  Acute cholecystitis.      HISTORY OF PRESENT ILLNESS:  Ms. Regan is an 89-year-old female with a history of dementia, living in assisted living currently, as well as a history of a pacer placement for bradycardia and paroxysmal atrial fibrillation (not on anticoagulation), who was admitted to the hospital with nausea and vomiting and a possible UTI.  After further analysis, it was thought this UTI was secondary to contamination, and this morning, the patient had elicited some right upper quadrant tenderness on evaluation by the Hospitalist demarcus.  Subsequent ultrasound showed multiple stones with a stone in the neck of the gallbladder.  She is unable to give much of the history but in talking with her daughter, it sounds as though she has had similar symptoms to this off and on for several months.  She has not had any history of jaundice, and her LFTs were unremarkable yesterday.      PAST MEDICAL AND SURGICAL HISTORY:  Includes a pacer placement, paroxysmal atrial fibrillation, hypertension.  She has a history of a hysterectomy but no upper abdominal operations.      CURRENT MEDICATIONS:  At the time of admission, the patient was on Tylenol, aspirin, Lipitor, Relafen, Lasix, Antivert, Robitussin, Toprol and Senna.      ALLERGIES:  THE PATIENT HAS DRUG ALLERGIES TO CODEINE, IBUPROFEN, LATEX, SULFAS, AND TRAMADOL.      SOCIAL HISTORY:  The patient lives in assisted living.  Her daughter, whom I spoke with today, is her medical decision maker.      PHYSICAL EXAMINATION:   VITAL SIGNS:  Ms. Regan is afebrile.  Temperature was 96.5, pulse 63, blood pressure 147/63, respiratory rate 16 with 98% saturations on room air.   GENERAL:  She is sleepy, does not interact with this exam and seems to be somewhat fidgety.   ABDOMEN:  Soft, without overt distention.  She has focal right upper quadrant tenderness with guarding.  I am unable to elicit a Whitehead sign due to  failure of cooperation.      LABORATORY EXAMINATIONS:  Notable for white blood cell count yesterday of 11.7 thousand, currently 9.9.  Hemoglobin is 12.4.  Electrolytes and LFTs are unremarkable.      IMAGING:  I personally reviewed the patient's ultrasound done late this morning.  It shows gallstones with a stone in the neck.  There was not a Whitehead sign per her report, although again, this is difficult to assess as she does not cooperate with her exam.  There is no wall thickening or duct dilation.      IMPRESSION AND PLAN:  This is a demented 89-year-old female with poor oral intake over the last couple of weeks to months, who now has tenderness in the right upper quadrant, which is reproducible on my exam as well.  This was associated with gallstones, including a stone in the neck of the gallbladder.  I discussed the matter at length with her daughter, Rachele and stated that the standard of care would be to proceed with cholecystectomy.  We discussed nonoperative options including watchful waiting with antibiotics, as well as cholecystostomy tube.  She is physiologically relatively healthy.  The patient's daughter and her family believe that she would wish to pursue surgical intervention that she could readily recover from.  We therefore plan on proceeding with surgery this afternoon.  Risks, benefits, alternatives and anticipated convalescence were all discussed as well.  Again, the daughter gave consent on her behalf to proceed.      Thank you very much for this consultation.         BEN DOZIER MD             D: 2021   T: 2021   MT: LASHA      Name:     ADELAIDA HANSON   MRN:      [id number]        Account:       [id number]   :      01/15/1931           Consult Date:  2021      Document: [id number]       cc: MD Warren

## 2021-01-03 NOTE — ED NOTES
RN spoke with pt's daughter and staff at care facility who state pt lives in the assisted living portion of the facility but that they are not a skilled facility and do not provide cares for the pt aside from daily medication and weekly showers. Pt recently diagnosed with UTI and started on Macrobid yesterday. Pt's daughter is concerned that pt is not receiving enough assistance. Pt has been refusing her medications since 2pm today. Facility states they are unable to take the pt back until Monday as they do not have nursing staff over the weekend. Provider notified.

## 2021-01-03 NOTE — ED NOTES
"[facility]  ED Nurse Handoff Report    Leighann Regan is a 89 year old female   ED Chief complaint: Fatigue  . ED Diagnosis:   Final diagnoses:   Nausea with vomiting   Urinary tract infection   Dementia (H)     Allergies:   Allergies   Allergen Reactions     Codeine      Ibuprofen      Latex      Sulfa Drugs      Tramadol        Code Status: DNR  Activity level - Baseline/Home:  Independent. Activity Level - Current:   Assist X 2. Lift room needed: No. Bariatric: No   Needed: No   Isolation: No. Infection: Not Applicable.     Vital Signs:   Vitals:    01/02/21 2100 01/02/21 2115 01/02/21 2130 01/02/21 2145   BP: 121/61 134/75 116/71 108/53   Pulse: 65 62 62 63   Resp:       Temp:       TempSrc:       SpO2: 98% 100% 98% 98%       Cardiac Rhythm:  ,      Pain level:    Patient confused: Yes. Patient Falls Risk: Yes.   Elimination Status: Has voided   Patient Report - Initial Complaint: Fatigue. Focused Assessment: Pt presents with \"a few days\" of not eating/drinking or taking medications. Pt c/o feeling nauseous but no active vomiting. Pt was stupor and dry heaving on EMS arrival today. Pt received fluids and zofran en route and pt appears much more alert per EMS. Pt lives at nursing home and no concerns for COVID at the facility.   Tests Performed: Labs, UA. Abnormal Results:   Labs Ordered and Resulted from Time of ED Arrival Up to the Time of Departure from the ED   CBC WITH PLATELETS DIFFERENTIAL - Abnormal; Notable for the following components:       Result Value    WBC 11.7 (*)      (*)     MCH 33.6 (*)     Absolute Neutrophil 9.9 (*)     All other components within normal limits   COMPREHENSIVE METABOLIC PANEL - Abnormal; Notable for the following components:    Glucose 102 (*)     GFR Estimate 59 (*)     Albumin 3.1 (*)     All other components within normal limits   ROUTINE UA WITH MICROSCOPIC - Abnormal; Notable for the following components:    Ketones Urine >150 (*)     " Protein Albumin Urine 50 (*)     Leukocyte Esterase Urine Large (*)     WBC Urine 42 (*)     RBC Urine 4 (*)     WBC Clumps Present (*)     Bacteria Urine Moderate (*)     Squamous Epithelial /HPF Urine 3 (*)     Mucous Urine Present (*)     All other components within normal limits   LACTIC ACID WHOLE BLOOD   MAY SALINE LOCK IV      Treatments provided: IV fluids, abx  Family Comments: Daughter updated.  OBS brochure/video discussed/provided to patient:  Yes  ED Medications:   Medications   cefTRIAXone (ROCEPHIN) 1 g vial to attach to  mL bag for ADULTS or NS 50 mL bag for PEDS (1 g Intravenous New Bag 1/2/21 2210)   0.9% sodium chloride BOLUS (1,000 mLs Intravenous New Bag 1/2/21 2057)     Drips infusing:  Yes  For the majority of the shift, the patient's behavior Green. Interventions performed were N/A.    Sepsis treatment initiated: No     Patient tested for COVID 19 prior to admission: YES    ED Nurse Name/Phone Number: Brianda Portillo RN,   10:11 PM    RECEIVING UNIT ED HANDOFF REVIEW    Above ED Nurse Handoff Report was reviewed: Yes  Reviewed by: Bentley Villasenor RN on January 2, 2021 at 11:43 PM

## 2021-01-03 NOTE — ANESTHESIA PREPROCEDURE EVALUATION
Anesthesia Pre-Procedure Evaluation    Patient: Leighann Regan   MRN: [id number] : 1/15/1931          Preoperative Diagnosis: Acute cholecystitis [K81.0]    Procedure(s):  CHOLECYSTECTOMY, LAPAROSCOPIC    Past Medical History:   Diagnosis Date     Constipation      Dementia      HTN (hypertension)      Macular degeneration      Pacemaker      Seborrheic dermatitis      Past Surgical History:   Procedure Laterality Date     HYSTERECTOMY       Anesthesia Evaluation     . Pt has had prior anesthetic.            ROS/MED HX    ENT/Pulmonary:  - neg pulmonary ROS     Neurologic:     (+)dementia,     Cardiovascular:     (+) hypertension----. : . . . pacemaker :. .       METS/Exercise Tolerance:     Hematologic:  - neg hematologic  ROS       Musculoskeletal:  - neg musculoskeletal ROS       GI/Hepatic:     (+) cholecystitis/cholelithiasis,       Renal/Genitourinary:  - ROS Renal section negative       Endo:     (+) Obesity, .      Psychiatric:         Infectious Disease:  - neg infectious disease ROS       Malignancy:      - no malignancy   Other:                          Physical Exam      Airway   Mallampati: II  TM distance: >3 FB  Neck ROM: full    Dental     Cardiovascular       Pulmonary             Lab Results   Component Value Date    WBC 9.9 2021    HGB 12.4 2021    HCT 37.9 2021     2021     2021    POTASSIUM 3.2 (L) 2021    CHLORIDE 111 (H) 2021    CO2 24 2021    BUN 21 2021    CR 0.78 2021     (H) 2021    ALESHA 8.1 (L) 2021    ALBUMIN 3.1 (L) 2021    PROTTOTAL 6.8 2021    ALT 26 2021    AST 33 2021    ALKPHOS 92 2021    BILITOTAL 0.9 2021    LIPASE 73 2018       Preop Vitals  BP Readings from Last 3 Encounters:   21 (!) 147/63   18 146/65    Pulse Readings from Last 3 Encounters:   21 63   18 65      Resp Readings from Last 3 Encounters:   21 16  "  07/26/18 18    SpO2 Readings from Last 3 Encounters:   01/03/21 98%   07/26/18 95%      Temp Readings from Last 1 Encounters:   01/03/21 96.5  F (35.8  C) (Axillary)    Ht Readings from Last 1 Encounters:   01/03/21 1.651 m (5' 5\")      Wt Readings from Last 1 Encounters:   01/03/21 82.6 kg (182 lb)    Estimated body mass index is 30.29 kg/m  as calculated from the following:    Height as of this encounter: 1.651 m (5' 5\").    Weight as of this encounter: 82.6 kg (182 lb).       Anesthesia Plan      History & Physical Review  History and physical reviewed and following examination; no interval change.    ASA Status:  2 .    NPO Status:  > 8 hours    Plan for General with Intravenous and Propofol induction. Maintenance will be Balanced.    PONV prophylaxis:  Ondansetron (or other 5HT-3) and Dexamethasone or Solumedrol         Postoperative Care  Postoperative pain management:  IV analgesics.      Consents  Anesthetic plan, risks, benefits and alternatives discussed with:  Patient..                 Edgar Duran MD                    .  "

## 2021-01-03 NOTE — PROGRESS NOTES
01/03/21 1101   Quick Adds   Type of Visit Initial PT Evaluation   Living Environment   People in home alone;facility resident   Current Living Arrangements assisted living   Home Accessibility no concerns   Living Environment Comments Per  note: pt's dtr Rachele who reports pt lives at [facility]-she receives cleaning/luandy, med management, meals, dressing and bathing services.  She uses a walker, no falls, no previous TCU or HC services.   Self-Care   Usual Activity Tolerance good   Current Activity Tolerance moderate   Equipment Currently Used at Home walker, rolling   Activity/Exercise/Self-Care Comment At baseline patient appears to be CHRISTY with FWW for functional mobility.   Disability/Function   Hearing Difficulty or Deaf yes   Patient's preferred means of communication   (pocket talker in use)   Fall history within last six months no   General Information   Onset of Illness/Injury or Date of Surgery 01/03/21   Referring Physician Fabrice Gupta MD   Patient/Family Therapy Goals Statement (PT) none stated   Pertinent History of Current Problem (include personal factors and/or comorbidities that impact the POC) per chart: 89 yo with history advanced dementia, hard of hearing, diastolic dysfunction, bradycardia with pacemaker, parox a fib, HTN admitted from [facility] on 1/2/2020 with nausea and vomiting.   Existing Precautions/Restrictions fall   General Observations Greeted patient returning to room on transport cart with NA at side.    Cognition   Orientation Status (Cognition) person   Affect/Mental Status (Cognition) confused   Follows Commands (Cognition) follows one-step commands;50-74% accuracy;repetition of directions required;physical/tactile prompts required;initiation impaired   Cognitive Status Comments Patient with baseline demential admitted from memory care unit   Pain Assessment   Patient Currently in Pain No   Integumentary/Edema    Integumentary/Edema no deficits were identifed   Posture    Posture Forward head position;Protracted shoulders;Kyphosis   Range of Motion (ROM)   ROM Comment B LE WFL   Strength   Strength Comments B LE functionally weak   Bed Mobility   Comment (Bed Mobility) supine <> EOB at [facility]   Transfers   Transfer Safety Comments sit <> stand with FWW at CGA   Gait/Stairs (Locomotion)   Comment (Gait/Stairs) 5ft with FWW at CGA, decreased tramaine/stride, anterior trunk lean   Balance   Balance Comments requires use of FWW; unsteady with ambulation & during bathroom tasks   Clinical Impression   Criteria for Skilled Therapeutic Intervention yes, treatment indicated   PT Diagnosis (PT) impaired functional mobility   Influenced by the following impairments LE weakness, fatigue, impaired balance   Functional limitations due to impairments bed mobility, transfers, ambulation   Clinical Presentation Stable/Uncomplicated   Clinical Presentation Rationale PMH, current presentation, clinical judgement   Clinical Decision Making (Complexity) low complexity   Therapy Frequency (PT) 3x/week  (while on obs status)   Predicted Duration of Therapy Intervention (days/wks) 3 days   Planned Therapy Interventions (PT) balance training;bed mobility training;gait training;neuromuscular re-education;patient/family education;strengthening;transfer training   Risk & Benefits of therapy have been explained evaluation/treatment results reviewed;care plan/treatment goals reviewed;patient   PT Discharge Planning    PT Discharge Recommendation (DC Rec) Transitional Care Facility;home with home care physical therapy;Long term care facility   PT Rationale for DC Rec Patient currenly unsteady with ambulation with FWW and requires Ax1 for bed mobility, transfers & ambulation. If memory care unit is able to provide Ax1 for all OOB activity. Otherwise patient would require TCU.   Therapy Certification   Start of care date 01/03/21   Certification date  from 01/03/21   Certification date to 01/06/21   Total Evaluation Time   Total Evaluation Time (Minutes) 7

## 2021-01-03 NOTE — H&P
[facility]    History and Physical - Hospitalist Service       Date of Admission:  1/3/2021     Assessment & Plan   Leighann Regan is a 89 year old female with a history of dementia who is admitted for fatigue, nausea and vomiting.    Nausea/vomiting  - Unclear etiology.  There is no current ability to obtain significant history due to the patient's dementia and inability to reach her daughter.  - Her labs look okay, no AUTUMN, very mild elevation in white count, normal LFTs. Lactate was normal.  - She had very mild abdominal discomfort to palpation but the abdomen was soft with no guarding.  Do not think she needs a CT scan at this time, but if symptoms seem to be stable versus worse in the morning, would recommend getting a CT of the abdomen.  - Maintenance IV fluids overnight.  - Treating symptomatically for now.    Possible urinary tract infection  - Probably just asymptomatic bacteriuria, but patient does have 42 white cells on her urinalysis with white blood cell clumps and bacteria.  She was given ceftriaxone which will be continued for now.  Reasonable to treat to see if it improves her symptoms above.    Chest pain  - Pt stated her chest hurt in the ED. Check EKG and trop.      Dementia  - Patient's bedside nurse in the ED had spoken to the daughter who indicated that patient's functional status seemed to be declining. Patient lives in a facility but it is not skilled.  - Need to get further information from daughter tomorrow.  - Consulted PT/OT/SW.    COVID 19 Negative    Diet: ADAT  DVT Prophylaxis: Pneumatic Compression Devices  Mccurdy Catheter: No  Code Status: Full code for now.  Was not able to reach daughter.    Disposition Plan   Admitted to observation.  Discharge when tolerating p.o.    Fabrice Gupta MD  [facility]    ______________________________________________________________________    Chief Complaint   Fatigue    History of Present Illness   Leighann KO  Jass is a 89 year old female with a history of dementia who presented to the ED for evaluation of fatigue, nausea and vomiting.  History was very limited.  Patient is hard of hearing and a poor provider history.  I called her daughter but was not able to reach her.  Apparently, the patient comes from a facility.  She was noted to have nausea, fatigue and vomiting.  Apparently she was refusing her medicines.  There is mention that she was recent diagnosed with UTI and started on Macrobid.  She was able to answer 1 question and said that she had some chest pain.  No further history could be obtained.    Review of Systems    Review of systems not obtained due to patient factors - confusion    Physical Exam   BP (!) 149/59 (BP Location: Left arm)   Pulse 66   Temp 97.5  F (36.4  C) (Oral)   Resp 16   SpO2 98%        General: No acute distress    HEENT: No scleral icterus. Oropharynx moist.     Neck: Supple.    Pulmonary: Normal work of breathing. Clear to auscultation bilaterally.    Cardiovascular: Regular rate and rhythm without murmur or extra heart sounds.    Abdomen: Soft, seems to cause mild discomfort when palpated but no focal tenderness.    Extremities: No peripheral edema. No clubbing or cyanosis.     Neurologic: Moves all extremities spontaneously    Skin: Warm and dry.      Data   Data reviewed today: I have reviewed all labs and imaging results.    Recent Labs   Lab 01/02/21  2047   WBC 11.7*   HGB 14.3   *         POTASSIUM 3.8   CHLORIDE 106   CO2 24   BUN 22   CR 0.86   ANIONGAP 11   ALESHA 9.1   *   ALBUMIN 3.1*   PROTTOTAL 6.8   BILITOTAL 0.9   ALKPHOS 92   ALT 26   AST 33     No results found for this or any previous visit (from the past 24 hour(s)).    Past Medical History    I have reviewed this patient's medical history and updated it with pertinent information if needed.   Past Medical History:   Diagnosis Date     Constipation      Dementia      HTN (hypertension)       Macular degeneration      Pacemaker      Seborrheic dermatitis         Past Surgical History    I have reviewed this patient's surgical history and updated it with pertinent information if needed.  Past Surgical History:   Procedure Laterality Date     HYSTERECTOMY          Social History    I have reviewed this patient's social history and updated it with pertinent information if needed.  Social History     Tobacco Use     Smoking status: Never Smoker   Substance Use Topics     Alcohol use: No     Drug use: No          Family History    I have reviewed this patient's family history and updated it with pertinent information if needed.   Family History   Problem Relation Age of Onset     Cerebrovascular Disease Mother      Diabetes Mother           Prior to Admission Medications    Prior to Admission Medications   Prescriptions Last Dose Informant Patient Reported? Taking?   Calcium Carb-Cholecalciferol 600-800 MG-UNIT TABS   Yes No   Sig: Take 1 tablet by mouth daily   Multiple Vitamins-Minerals (PRESERVISION AREDS 2) CAPS   Yes No   Sig: Take 1 capsule by mouth daily   acetaminophen (TYLENOL) 325 MG tablet   Yes No   Sig: Take 650 mg by mouth every 8 hours as needed for mild pain   aspirin 81 MG chewable tablet   Yes No   Sig: Take 81 mg by mouth daily   atorvastatin (LIPITOR) 10 MG tablet   No No   Sig: Take 1 tablet (10 mg) by mouth every evening   benzonatate (TESSALON) 100 MG capsule   No No   Sig: Take 1 capsule (100 mg) by mouth 3 times daily as needed for cough   furosemide (LASIX) 20 MG tablet   No No   Sig: Take 1 tablet (20 mg) by mouth daily for 5 days   guaiFENesin-dextromethorphan (ROBITUSSIN DM) 100-10 MG/5ML syrup   No No   Sig: Take 5 mLs by mouth every 4 hours as needed for cough   meclizine (ANTIVERT) 25 MG tablet   Yes No   Sig: Take 25 mg by mouth 3 times daily as needed for dizziness   metoprolol succinate (TOPROL-XL) 50 MG 24 hr tablet   Yes No   Sig: Take 50 mg by mouth daily   mupirocin  (BACTROBAN) 2 % cream   Yes No   Sig: Apply topically 2 times daily as needed Apply to scalp   senna-docusate (SENOKOT-S;PERICOLACE) 8.6-50 MG per tablet   Yes No   Sig: Take 1 tablet by mouth daily as needed for constipation      Facility-Administered Medications: None        Allergies    Allergies   Allergen Reactions     Codeine      Ibuprofen      Latex      Sulfa Drugs      Tramadol

## 2021-01-03 NOTE — ED TRIAGE NOTES
"Pt presents with \"a few days\" of not eating/drinking or taking medications. Pt c/o feeling nauseous but no active vomiting. Pt was stupor and dry heaving on EMS arrival today. Pt received fluids and zofran en route and pt appears much more alert per EMS. Pt lives at nursing home and no concerns for COVID at the facility.  "

## 2021-01-03 NOTE — ANESTHESIA CARE TRANSFER NOTE
Patient: Leighann Regan    Procedure(s):  CHOLECYSTECTOMY, LAPAROSCOPIC    Diagnosis: Acute cholecystitis [K81.0]  Diagnosis Additional Information: No value filed.    Anesthesia Type:   General     Note:  Airway :Face Mask  Patient transferred to:PACU  Comments: VSS.  Spontaneously breathing O2 per simple face mask.  Report given to RNHandoff Report: Identifed the Patient, Identified the Reponsible Provider, Reviewed the pertinent medical history, Discussed the surgical course, Reviewed Intra-OP anesthesia mangement and issues during anesthesia, Set expectations for post-procedure period and Allowed opportunity for questions and acknowledgement of understanding      Vitals: (Last set prior to Anesthesia Care Transfer)    CRNA VITALS  1/3/2021 1626 - 1/3/2021 1712      1/3/2021             Pulse:  58    SpO2:  98 %    Resp Rate (observed):  9                Electronically Signed By: JASMIN Reynoso CRNA  January 3, 2021  5:12 PM

## 2021-01-03 NOTE — PROGRESS NOTES
"PRIMARY DIAGNOSIS: GENERALIZED WEAKNESS, N/V    OUTPATIENT/OBSERVATION GOALS TO BE MET BEFORE DISCHARGE  1. Orthostatic performed: No    2. Tolerating PO medications: Spiting after any po intake    3. Return to near baseline physical activity: No    4. Cleared for discharge by consultants (if involved): No    Discharge Planner Nurse   Safe discharge environment identified: No  Barriers to discharge: Yes       Entered by: Benji Lepe 01/03/2021      BP (!) 147/63 (BP Location: Left arm)   Pulse 63   Temp 96.5  F (35.8  C) (Axillary)   Resp 16   Ht 1.651 m (5' 5\")   Wt 82.6 kg (182 lb)   SpO2 98%   BMI 30.29 kg/m      Pt oriented to self only.  Very [place], has pocket talker at bedside.  Slow to respond to commands and resistant to cares intermittently.  Denies pain.  Taking sips of water and clear liquids with meds but spitting and belching afterwards.  Reporting intermittent nausea, no emesis-getting prn anti-emetics.  IVFS infusing at 100  ml per hour.   Up with A x 1, walker and gait belt. PT saw and  rec   A x 1 for all OOB activity. Plan:  Rocephin for possible UTI, ADAT and SW following for discharge. Continue to monitor and provide supportive cares.  Bed alarm activated for safety.     Please review provider order for any additional goals.   Nurse to notify provider when observation goals have been met and patient is ready for discharge.  "

## 2021-01-03 NOTE — ED PROVIDER NOTES
History   Chief Complaint:  Fatigue       HPI limited due to Dementia        Leighann Regan is a 89 year old female with history of Dementia who presents with fatigue, nausea and vomiting. The patient denies fall. She comes to the ER from [facility].  Report is from medics and from nursing report.  Paperwork from facility also states fatigue nausea and vomiting.    Review of Systems   Unable to perform ROS: Dementia   Constitutional: Positive for fatigue.   Gastrointestinal: Positive for nausea and vomiting.   All other systems reviewed and are negative.        Allergies:  Codeine   Ibuprofen  Latex  Sulfa Drugs  Tramadol    Medications:  Tylenol  Aspirin  Lipitor  Ressalon  Lasix  Antivert  Robitussin  Toprol  Senna-docusate      Past Medical History:    Constipation  Dementia  Hypertension  Macular degeneration  Pacemaker  Seborrheic dermatitis  Elevated troponin      Past Surgical History:    Hysterectomy    Family History:    Cerebrovascular Disease  Diabetes    Social History:  Lives in [facility]    Physical Exam     Patient Vitals for the past 24 hrs:   BP Temp Temp src Pulse Resp SpO2   01/02/21 2300 117/54 -- -- 60 -- 99 %   01/02/21 2245 127/58 -- -- 61 -- 97 %   01/02/21 2230 130/62 -- -- 63 -- 99 %   01/02/21 2215 129/62 -- -- 66 -- 99 %   01/02/21 2200 115/56 -- -- 60 -- 98 %   01/02/21 2145 108/53 -- -- 63 -- 98 %   01/02/21 2130 116/71 -- -- 62 -- 98 %   01/02/21 2115 134/75 -- -- 62 -- 100 %   01/02/21 2100 121/61 -- -- 65 -- 98 %   01/02/21 2045 120/54 -- -- 65 -- 98 %   01/02/21 2030 136/88 -- -- 67 -- 98 %   01/02/21 2027 (!) 152/74 98.4  F (36.9  C) Oral 68 16 98 %       Physical Exam  General: Alert, No obvious discomfort, well kept, elderly and frail, extremely hard of hearing  Eyes: PERRL, conjunctivae pink no scleral icterus or conjunctival injection  ENT:   Moist mucus membranes, posterior oropharynx clear without erythema or exudates, No lymphadenopathy, Normal  voice  Resp:  Lungs clear to auscultation bilaterally, no crackles/rubs/wheezes. Good air movement  CV:  Normal rate and rhythm, no murmurs/rubs/gallops  GI:  Abdomen soft and non-distended.  Normoactive BS.  No tenderness, guarding or rebound, Yes masses  Skin:  Warm, dry.  No rashes or petechiae  Musculoskeletal: No peripheral edema or calf tenderness, Normal gross ROM, complains of pain with even light touch in all parts of lower extremities  Neuro: Alert to person, [place]  Psychiatric: Normal affect, cooperative, good eye contact      Emergency Department Course     Laboratory:  CBC:  AWNL. (WBC 11.7 (H), HGB 14.3, )     CMP: Glucose 102 (H), GFR 59 (L) , Albumin 3.1 (L) o/w WNL (Creatinine: 0.86)    UA: Urineketon >150, Protein albumin 50, Leukocyte esterase Large, WBC/HPF 42 (H), RBC/HPF 4 (H), WBC Clumps Present, Bacteria Moderate, Squamous epithelial 3 (H), Mucous Present, o/w Negative    Lactic Acid: 1.6    COVID-19 Virus PCR: Pending      Emergency Department Course:    Assessments:  2058 I completed initial assessment and exam  2150 I reassessed the patient and discussed results of the workup thus far    Consults:   2225 I consulted Dr. Gupta from Hospitalist service regarding patient admission    Interventions:  2057 NS 1L IV Bolus  2210 Rocephin 1g IV    Disposition:  The patient was admitted to the hospital under the care of Dr. Gupta.       Impression & Plan   Covid-19  Leighann Regan was evaluated during a global COVID-19 pandemic, which necessitated consideration that the patient might be at risk for infection with the SARS-CoV-2 virus that causes COVID-19.   Applicable protocols for evaluation were followed during the patient's care.   COVID-19 was considered as part of the patient's evaluation. The plan for testing is:  a test was obtained during this visit.      Medical Decision Making:  Leighann Regan is a 89 year old female who presents today for evaluation of nausea  vomiting and lethargy from her memory care unit.  She is sent in from a memory care assisted living facility.  This is not a skilled nursing facility.  They noted that over the last day or so she has had decreased appetite and has not eaten as much and today she seemed very fatigued and weak therefore they sent her in for evaluation.  She also had nausea and vomiting today.  Her examination is limited due to her dementia.  Head to toe exam showed no sign of trauma.  Laboratory studies showed urinary tract infection.  This was apparently known and patient had been placed on nitrofurantoin for this.  Given her symptoms this is unlikely to have been an effective medication and she is started on Rocephin.  Her laboratory studies confirm urinary tract infection.  She has a normal lactic acid and slightly elevated white blood cell count.  Kidney function is within normal limits for patient.  At this point there is no indication for further testing or imagery.  As patient's facility is unable to accept her back tonight she will be admitted to the observation unit for continued monitoring and treatment for urinary tract infection.  Placement is also a consideration.  Skilled nursing facility might be needed.  I discussed the case with the hospitalist who did accept patient to his service.       Diagnosis:    ICD-10-CM    1. Nausea with vomiting  R11.2    2. Urinary tract infection  N39.0    3. Dementia (H)  F03.90        Scribe Disclosure:  I, Viral Smith, am serving as a scribe at 8:54 PM on 1/2/2021 to document services personally performed by Harpreet Viveros APRN based on my observations and the provider's statements to me.            Harpreet Viveros APRN CNP  01/03/21 0022

## 2021-01-03 NOTE — PLAN OF CARE
[facility]      OUTPATIENT PHYSICAL THERAPY EVALUATION  PLAN OF TREATMENT FOR OUTPATIENT REHABILITATION  (COMPLETE FOR INITIAL CLAIMS ONLY)  Patient's Last Name, First Name, M.I.  YOB: 1931  Leighann Regan                        Provider's Name  [facility] Medical Record No.  [id number]                               Onset Date:  01/03/21   Start of Care Date:  01/03/21      Type:     _X_PT   ___OT   ___SLP Medical Diagnosis:                           PT Diagnosis:  impaired functional mobility   Visits from SOC:  1   _________________________________________________________________________________  Plan of Treatment/Functional Goals    Planned Interventions: balance training, bed mobility training, gait training, neuromuscular re-education, patient/family education, strengthening, transfer training     Goals: See Physical Therapy Goals on Care Plan in [employer] electronic health record.    Therapy Frequency: 3x/week(while on obs status)  Predicted Duration of Therapy Intervention: 3 days  _________________________________________________________________________________    I CERTIFY THE NEED FOR THESE SERVICES FURNISHED UNDER        THIS PLAN OF TREATMENT AND WHILE UNDER MY CARE     (Physician co-signature of this document indicates review and certification of the therapy plan).                Certification date from: 01/03/21, Certification date to: 01/06/21    Referring Physician: Fabrice Gupta MD            Initial Assessment        See Physical Therapy evaluation dated 01/03/21 in Epic electronic health record.

## 2021-01-03 NOTE — DISCHARGE INSTRUCTIONS
Your home care referral was sent to [facility]  If you haven't heard from them within the next 24-48 hours,  Please call them at [phone]        HOME CARE FOLLOWING LAPAROSCOPIC CHOLECYSTECTOMY  ARAMIS Man, MELYSSA Rodriguez R. O Donnell, J. Shaheen    INCISIONAL CARE:  Replace the bandage over your incisions DAILY until all drainage stops, or if more comfortable to have in place.  If present, leave the steri-strips (white paper tapes) in place for 14 days after surgery.  If Dermabond (a type of skin glue) is present, leave in place until it wears/flakes off (2-3 weeks).     BATHING:  OK to shower 48 hours after surgery.  Avoid baths for 1 week after surgery.  You may wash your hair at any time.  Gently pat your incision dry after bathing.  Do not apply lotions, creams, or ointments to incisions.    ACTIVITY:  Light Activity -- you may immediately be up and about as tolerated.  Walking is encouraged, increase as tolerated.  Driving/Light Work-- when comfortable and off narcotic pain medications.  Strenuous Work/Activity -- limit lifting to 20 pounds for 2 weeks.  Progressively increase with time.  Active Sports (running, biking, etc.) -- cautiously resume after 2 weeks.    DISCOMFORT:  Local anesthetic placed at surgery should provide relief for 4-8 hours.  Begin taking pain pills before discomfort is severe.  Take the pain medication with some food, when possible, to minimize side effects.  Intermittent use of ice packs may help during the first 1-3 weeks after surgery.  Expect gradual improvement.    Over-the-counter anti-inflammatory medications (i.e. Ibuprofen/Advil/Motrin or Naprosyn/Aleve) may be used per package instructions in addition to or while tapering off the narcotic pain medications to decrease swelling and sensitivity.  DO NOT TAKE these Anti-inflammatory medications if your primary physician has advised against doing so, or if you have acid reflux, ulcer, or  bleeding disorder, or take blood-thinner medications.  Call your primary physician or the surgery office if you have medication questions.    After laparoscopic cholecystectomy, you may have shoulder or upper back discomfort due to the gas used during surgery.  This is temporary and should resolve within 2-3 days.  Frequent short walks may help with this.  You may have decreased energy level for 1-2 weeks after surgery related to your recovery.    DIET:  Start with liquids and gradually increase diet as tolerated.  Drink plenty of fluids.  While taking pain medications, consider use of a stool softener, increase your fiber in your diet, or add a fiber supplement (like Metamucil, Citrucel) to help prevent constipation - a possible side effect of pain medications.  It is not uncommon to experience some bowel changes (loose stools or constipation) after surgery.  Your body has to adapt to you no longer having a gall bladder.  To help minimize this side effect, avoid fatty foods for 1-2 weeks after surgery.  You may then slowly increase the amount of fatty foods in your diet.      NAUSEA:  If nauseated from the anesthetic/pain meds; rest in bed, get up cautiously with assistance, and drink clear liquids (juice, tea, broth).    FOLLOW-UP AFTER SURGERY:  -Our office will contact you approximately 2-3 weeks after surgery to check on your progress and answer any questions you may have.  If you are doing well, you will not need to return for an office appointment.  If any concerns are identified over the phone, we will help you make an appointment to see a provider.    -If you have not received a phone call, have any questions or concerns, or would like to be seen, please call us at [phone].  We are located at: [address] [facility], [address]; [place] [address]    -CONTACT US IF THE FOLLOWING DEVELOPS:   1. A fever that is above 101     2. Increased redness, warmth, drainage, bleeding, or swelling.   3. Pain that is  not relieved by rest/ice and your prescription.   4.  Increasing pain after 48 hours.   5. Drainage that is thick, cloudy, yellow, green or white.   6. Any other questions or concerns.      FREQUENTLY ASKED QUESTIONS:    Q:  How should my incision look?    A:  Normally your incision will appear slightly swollen with light redness directly along the incision itself as it heals.  It may feel like a bump or ridge as the healing/scarring happens, and over time (3-4 months) this bump or ridge feeling should slowly go away.  In general, clear or pink watery drainage can be normal at first as your incision heals, but should decrease over time.    Q:  How do I know if my incision is infected?  A:  Look at your incision for signs of infection, like redness around the incision spreading to surrounding skin, or drainage of cloudy or foul-smelling drainage.  If you feel warm, check your temperature to see if you are running a fever.    **If any of these things occur, please notify the nurse at our office.  We may need you to come into the office for an incision check.      Q:  How do I take care of my incision?  A:  If you have a dressing in place - Starting the day after surgery, replace the dressing 1-2 times a day until there is no further drainage from the incision.  At that time, a dressing is no longer needed.  Try to minimize tape on the skin if irritation is occurring at the tape sites.  If you have significant irritation from tape on the skin, please call the office to discuss other method of dressing your incision.    Small pieces of tape called  steri-strips  may be present directly overlying your incision; these may be removed 10 days after surgery unless otherwise specified by your surgeon.  If these tapes start to loosen at the ends, you may trim them back until they fall off or are removed.    A:  If you had  Dermabond  tissue glue used as a dressing (this causes your incision to look shiny with a clear covering  over it) - This type of dressing wears off with time and does not require more dressings over the top unless it is draining around the glue as it wears off.  Do not apply ointments or lotions over the incisions until the glue has completely worn off.    Q:  There is a piece of tape or a sticky  lead  still on my skin.  Can I remove this?  A:  Sometimes the sticky  leads  used for monitoring during surgery or for evaluation in the emergency department are not all removed while you are in the hospital.  These sometimes have a tab or metal dot on them.  You can easily remove these on your own, like taking off a band-aid.  If there is a gel substance under the  lead , simply wipe/clean it off with a washcloth or paper towel.      Q:  What can I do to minimize constipation (very hard stools, or lack of stools)?  A:  Stay well hydrated.  Increase your dietary fiber intake or take a fiber supplement -with plenty of water.  Walk around frequently.  You may consider an over-the-counter stool-softener.  Your Pharmacist can assist you with choosing one that is stocked at your pharmacy.  Constipation is also one of the most common side effects of pain medication.  If you are using pain medication, be pro-active and try to PREVENT problems with constipation by taking the steps above BEFORE constipation becomes a problem.    Q:  What do I do if I need more pain medications?  A:  Call the office to receive refills.  Be aware that certain pain meds cannot be called into a pharmacy and actually require a paper prescription.  A change may be made in your pain med as you progress thru your recovery period or if you have side effects to certain meds.    --Pain meds are NOT refilled after 5pm on weekdays, and NOT AT ALL on the weekends, so please look ahead to prevent problems.      Q:  Why am I having a hard time sleeping now that I am at home?  A:  Many medications you receive while you are in the hospital can impact your sleep for a  number of days after your surgery/hospitalization.  Decreased level of activity and naps during the day may also make sleeping at night difficult.  Try to minimize day-time naps, and get up frequently during the day to walk around your home during your recovery time.  Sleep aides may be of some help, but are not recommended for long-term use.      Q:  I am having some back discomfort.  What should I do?  A:  This may be related to certain positioning that was required for your surgery, extended periods of time in bed, or other changes in your overall activity level.  You may try ice, heat, acetaminophen, or ibuprofen to treat this temporarily.  Note that many pain medications have acetaminophen in them and would state this on the prescription bottle.  Be sure not to exceed the maximum of 4000mg per day of acetaminophen.     **If the pain you are having does not resolve, is severe, or is a flare of back pain you have had on other occasions prior to surgery, please contact your primary physician for further recommendations or for an appointment to be examined at their office.    Q:  Why am I having headaches?  A:  Headaches can be caused by many things:  caffeine withdrawal, use of pain meds, dehydration, high blood pressure, lack of sleep, over-activity/exhaustion, flare-up of usual migraine headaches.  If you feel this is related to muscle tension (a band-like feeling around the head, or a pressure at the low-back of the head) you may try ice or heat to this area.  You may need to drink more fluids (try electrolyte drink like Gatorade), rest, or take your usual migraine medications.   **If your headaches do not resolve, worsen, are accompanied by other symptoms, or if your blood pressure is high, please call your primary physician for recommendation and/or examination.    Q:  I am unable to urinate.  What do I do?  A:  A small percentage of people can have difficulty urinating initially after surgery.  This includes  being able to urinate only a very small amount at a time and feeling discomfort or pressure in the very low abdomen.  This is called  urinary retention , and is actually an urgent situation.  Proceed to your nearest Emergency department for evaluation (not an Urgent Care Center).  Sometimes the bladder does not work correctly after certain medications you receive during surgery, or related to certain procedures.  You may need to have a catheter placed until your bladder recovers.  When planning to go to an Emergency department, it may help to call the ER to let them know you are coming in for this problem after a surgery.  This may help you get in quicker to be evaluated.  **If you have symptoms of a urinary tract infection, please contact your primary physician for the proper evaluation and treatment.          If you have other questions, please call the office Monday thru Friday between 8am and 5pm to discuss with the nurse or physician assistant.  #[phone]    There is a surgeon ON CALL on weekday evenings and over the weekend in case of urgent need only, and may be contacted at the same number.    If you are having an emergency, call 911 or proceed to your nearest emergency department.

## 2021-01-03 NOTE — PLAN OF CARE
ROOM # 213-2    Living Situation (if not independent, order SW consult): [facility] with minimal services.   Facility name:[facility]   : Rachele, daughter     Activity level at baseline: maria m james/ LESTER per patient has dementia   Activity level on admit: Ax1, walker pivot      Patient registered to observation; given Patient Bill of Rights; given the opportunity to ask questions about observation status and their plan of care.  Patient has been oriented to the observation room, bathroom and call light is in place.    Discussed discharge goals and expectations with patient/family.

## 2021-01-03 NOTE — CONSULTS
Care Management Initial Consult    General Information  Assessment completed with: Children, Rachele  Type of CM/SW Visit: Initial Assessment    Primary Care Provider verified and updated as needed:     Readmission within the last 30 days:           Advance Care Planning:            Communication Assessment  Patient's communication style: spoken language (English or Bilingual)    Hearing Difficulty or Deaf: yes   Wear Glasses or Blind: no    Cognitive  Cognitive/Neuro/Behavioral: .WDL except  Level of Consciousness: confused  Arousal Level: opens eyes spontaneously  Orientation: disoriented to, place, time, situation  Mood/Behavior: flat affect, withdrawn  Best Language: 0 - No aphasia  Speech: clear, spontaneous    Living Environment:   People in home: alone     Current living Arrangements: assisted living  Name of Facility: [facility]   Able to return to prior arrangements:  yes       Family/Social Support:  Care provided by: self, child(mihai), other (see comments)  Provides care for:  nobody                Description of Support System:    Supportive and involved        Current Resources:   Skilled Home Care Services:  no  Community Resources:  none  Equipment currently used at home:  walker  Supplies currently used at home:      Employment/Financial:  Employment Status:     retired     Financial Concerns:   None identified           Lifestyle & Psychosocial Needs:        Socioeconomic History     Marital status:      Spouse name: Not on file     Number of children: 7     Years of education: Not on file     Highest education level: Not on file   Occupational History     Occupation: retired     Tobacco Use     Smoking status: Never Smoker   Substance and Sexual Activity     Alcohol use: No     Drug use: No       Functional Status:  Prior to admission patient needed assistance:              Mental Health Status:  Mental Health Status: No Current Concerns       Chemical Dependency Status:  Chemical  Dependency Status: No Current Concerns             Values/Beliefs:  Spiritual, Cultural Beliefs, Caodaism Practices, Values that affect care:                 Additional Information:  Spoke with pt's dtr Rachele who reports pt lives at [facility]-she receives cleaning/luandy, med management, meals, dressing and bathing services.  She uses a walker, no falls, no previous TCU or HC services.  Rachele would be able to provide transport if someone is able to help get pt into car.    discharge plans uncertain waiting for PT consults.     Patrizia RESTREPO, [facility]  Inpatient Care Coordination   [facility]   [phone]

## 2021-01-03 NOTE — OP NOTE
Procedure Date: 01/03/2021      PREOPERATIVE DIAGNOSIS:  Acute cholecystitis.      POSTOPERATIVE DIAGNOSIS:  Acute cholecystitis.      PROCEDURE:  Laparoscopic cholecystectomy.      ANESTHESIA:  General plus local.      SURGEON:  Drake Zarco MD      ASSISTANT:  Casey Mcwilliams PA-C.  A physician assistant was medically necessary for his skills in suturing, exposure, traction, and camera management throughout the operation.      SPECIMENS:  Gallbladder and contents.      COMPLICATIONS:  None.      INDICATIONS:  Ms. Regan is an 89-year-old female with history of dementia and several cardiac concerns, who came to the ED with nausea and vomiting and what seemed initially to be a UTI driving these symptoms.  However, she had right upper quadrant pain on exam, during rounds this morning, which prompted a subsequent ultrasound showing gallstones including one in the neck of the gallbladder.  She had persistent tenderness at the site when I examined her later and confirmed a probable diagnosis of cholecystitis.  I discussed the matter with her daughter and recommended proceeding with cholecystectomy.  Other nonoperative treatment options were discussed, but the patient's family wished to pursue a surgical intervention.  Risks of the operation were therefore outlined in detail.  These include infection, bleeding, harm to structures, open conversion, retained stone, bile leak, common duct injury and chronic diarrhea and anticipated convalescence otherwise, the patient's daughter gave consent on her behalf to proceed.      FINDINGS:  The patient had a tensely distended gallbladder with mild edema in the wall, which was encased in omentum.  There was a stone impacted in the neck, which was actually eroding through the gallbladder wall itself.  The critical view of safety was obtained prior to clipping and dividing the cystic duct.      DESCRIPTION OF PROCEDURE:  With the patient under excellent general anesthesia in supine  position, abdomen was prepped and draped in the usual sterile surgical fashion.  A timeout was then performed confirming the patient, procedure to be done as well as drug allergies.  She did receive a dose of Ancef for infection prophylaxis prior to making our initial incision.  We began by elevating the supraumbilical skin fold, incising longitudinally with an 11 blade.  Electrocautery and blunt dissection were then carried out down to the level of midline fascia, which was grasped and elevated into view.  The fascia was incised sharply with a #15 blade.  Stay sutures were placed at the apices and the peritoneum was punctured bluntly with a Carmalt.  We introduced a Josafat port through this defect, applied insufflation, placed the patient head up and rolled her slightly to her left.  Three further 5 mm ports were placed under direct vision in the epigastrium and right upper quadrant.  The gallbladder was visualized and found to be distended as well as encased in omentum.  It was bluntly elevated into view and some omental adhesions were taken down with electrocautery.  They did not readily peeled away; however, which prompted us to use a 5 mm LigaSure device to divide and separate the omentum from the gallbladder.  Eventually close to the infundibulum, these attachments became quite filmy.  We were able to sweep away.  The infundibulum was subsequently grasped and retracted laterally and the serosa on the medial and lateral aspects were incised with electrocautery and swept down.  There was a stone impacted in the neck of the gallbladder, which was not mobile and infected actually eroded through a portion of the wall, making a visibly evident.  We identified the right hepatic artery as it was quite prominent and a branch of this coming towards the gallbladder was bluntly surrounded, clipped and divided.  This opened the window adjacent to the presumptive cystic duct obtaining the critical view.  Some further  edematous tissues were taken down from around the cystic duct was subsequently palpated and found to contain no stones within it.  The cystic duct was clipped and divided, and the gallbladder was dissected free from the liver bed with electrocautery thereafter.  The specimen was then placed in an Endocatch pouch.  The right upper quadrant was then irrigated, suctioned dry and found to be satisfactorily hemostatic.  The upper ports were removed as was the Josafat and insufflation was released.  The specimen was then delivered through the fascial defect in the pouch and passed off for routine pathology.  We closed the fascia with 0 Vicryl stitch in figure-of-eight fashion x2, stay sutures were then tied down over them.  30 mL of 0.5% Marcaine distributed in all incisions.  Skin was closed with 4-0 Vicryls in deep dermal fashion.  Skin glue was applied atop the closed wound.  The patient tolerated the procedure well.  She was extubated and brought to recovery in excellent condition.  All sharps and sponge counts were correct at the conclusion of the case.         BEN DOZIER MD             D: 2021   T: 2021   MT:       Name:     ADELAIDA HANSON   MRN:      [id number]        Account:        [id number]   :      01/15/1931           Procedure Date: 2021      Document: [id number]

## 2021-01-03 NOTE — ED NOTES
Bed: ED23  Expected date:   Expected time:   Means of arrival:   Comments:  REESE 595 - 89 F lethargy

## 2021-01-03 NOTE — PLAN OF CARE
PRIMARY DIAGNOSIS: GENERALIZED WEAKNESS/ N,V,D  OUTPATIENT/OBSERVATION GOALS TO BE MET BEFORE DISCHARGE  1. Orthostatic performed: No    2. Tolerating PO medications: Yes    3. Return to near baseline physical activity: No    4. Cleared for discharge by consultants (if involved): No    Discharge Planner Nurse   Safe discharge environment identified: No  Barriers to discharge: Yes       Entered by: Bentley Villasenor 01/03/2021 3:32 AM     Please review provider order for any additional goals.   Nurse to notify provider when observation goals have been met and patient is ready for discharge.

## 2021-01-03 NOTE — PROGRESS NOTES
Received SW consult.  I have LVM for pt's dtr requesting a call back.      Patrizia RESTREPO, [facility]  Inpatient Care Coordination   [facility]   [phone]

## 2021-01-03 NOTE — BRIEF OP NOTE
[facility]    Brief Operative Note    Pre-operative diagnosis: Acute cholecystitis [K81.0]  Post-operative diagnosis Acute cholecystitis    Procedure: Procedure(s):  CHOLECYSTECTOMY, LAPAROSCOPIC  Surgeon: Surgeon(s) and Role:     * Drake Bailey MD - Primary     * Casey Mcwilliams PA-C - Assisting  Anesthesia: General   Estimated blood loss: Less than 10 ml  Drains: None  Specimens:   ID Type Source Tests Collected by Time Destination   A : gallbladder and contents Tissue Gallbladder and Contents SURGICAL PATHOLOGY EXAM Drake Bailey MD 1/3/2021  4:30 PM      Findings:   Distended and edematous gallbladder with stones lodged in the neck, encased in omental adhesions..  Complications: None.  Implants: * No implants in log *

## 2021-01-03 NOTE — PROGRESS NOTES
[facility]    Medicine Progress Note - Hospitalist Service       Date of Admission:  1/2/2021  Assessment & Plan       88 yo with history advanced dementia, hard of hearing, diastolic dysfunction, bradycardia with pacemaker, parox a fib, HTN admitted from [facility] on 1/2/2020 with nausea and vomiting.    Nausea and vomiting    - initially felt to be due to possible UTI (see below)    - this am she has some RUQ tenderness on exam: will ontain RUQ US    - PRN anti-emetics    - advance diet as tolerated (on clears)    - IVF with D5 1/2 NS with KCL  Addendum: US shows gallstone, stone in gallbladder neck. I called and updated daughter. Called and spoke with Dr. Zarco who will see the patient.    Weakness    - will have nursing assess    - PT to see    Possible UTI    - UA appeared dirty in ED, culture pending    - started on ceftriaxone    - no previous culture data    Advanced dementia    - at her baseline    - lived at [facility]    Pacemaker    - was placed in [place]    - appears to be due to bradycardia    Parox a fib    - has been seen by cards    - no anticoagulation due to fall risk    - cont pta Toprol    HTN    - cont pta Toprol    Called family. Spoke with son in law and updated him.  Apparently her facility cannot take her back until tomorrow    DNR/DNI: confirmed with patient's facility paperwork.     Diet: Advance Diet as Tolerated: Clear Liquid Diet    DVT Prophylaxis: Enoxaparin (Lovenox) SQ  Mccurdy Catheter: not present  Code Status: Full Code           Disposition Plan   Expected discharge: Tomorrow, recommended to prior living arrangement once tolerating PO.  Entered: John Barnes MD 01/03/2021, 8:58 AM       The patient's care was discussed with the Patient and Patient's Familyand bedside nurse    John Barnes MD  Hospitalist Service  [facility]  Contact information available via [facility]  Joe/Directory    ______________________________________________________________________    Interval History   Patient in bed. She is unclothed. She does not know where she is. She can tell me she has some nausea. She denies chest pain. When I examine her she has some RUQ tenderness.     Data reviewed today: I reviewed all medications, new labs and imaging results over the last 24 hours. I personally reviewed the pending US image(s) showing pending.    Physical Exam   Vital Signs: Temp: 97.4  F (36.3  C) Temp src: Oral BP: (!) 140/55 Pulse: 64   Resp: 14 SpO2: 96 % O2 Device: None (Room air)    Weight: 182 lbs 0 oz  Constitutional: awake, alert, cooperative, no apparent distress, and appears stated age  Eyes: Lids and lashes normal, pupils equal, round and reactive to light, extra ocular muscles intact, sclera clear, conjunctiva normal  ENT: Normocephalic, without obvious abnormality, atraumatic, sinuses nontender on palpation, external ears without lesions, oral pharynx with moist mucous membranes, tonsils without erythema or exudates, gums normal and good dentition.  Respiratory: No increased work of breathing, good air exchange, clear to auscultation bilaterally, no crackles or wheezing  Cardiovascular: Normal apical impulse, regular rate and rhythm, normal S1 and S2, no S3 or S4, and no murmur noted  GI: soft. Some RUQ tenderness  Skin: no bruising or bleeding  Musculoskeletal: no lower extremity pitting edema present    Data   Recent Labs   Lab 01/03/21  0551 01/02/21  2047   WBC 9.9 11.7*   HGB 12.4 14.3   * 103*    213    141   POTASSIUM 3.2* 3.8   CHLORIDE 111* 106   CO2 24 24   BUN 21 22   CR 0.78 0.86   ANIONGAP 7 11   ALESHA 8.1* 9.1   * 102*   ALBUMIN  --  3.1*   PROTTOTAL  --  6.8   BILITOTAL  --  0.9   ALKPHOS  --  92   ALT  --  26   AST  --  33   TROPI  --  <0.015     No results found for this or any previous visit (from the past 24 hour(s)).

## 2021-01-04 LAB
ANION GAP SERPL CALCULATED.3IONS-SCNC: 4 MMOL/L (ref 3–14)
BUN SERPL-MCNC: 17 MG/DL (ref 7–30)
CALCIUM SERPL-MCNC: 7.8 MG/DL (ref 8.5–10.1)
CHLORIDE SERPL-SCNC: 110 MMOL/L (ref 94–109)
CO2 SERPL-SCNC: 25 MMOL/L (ref 20–32)
CREAT SERPL-MCNC: 0.74 MG/DL (ref 0.52–1.04)
GFR SERPL CREATININE-BSD FRML MDRD: 71 ML/MIN/{1.73_M2}
GLUCOSE SERPL-MCNC: 153 MG/DL (ref 70–99)
POTASSIUM SERPL-SCNC: 3.6 MMOL/L (ref 3.4–5.3)
SODIUM SERPL-SCNC: 139 MMOL/L (ref 133–144)

## 2021-01-04 PROCEDURE — 96376 TX/PRO/DX INJ SAME DRUG ADON: CPT

## 2021-01-04 PROCEDURE — G0378 HOSPITAL OBSERVATION PER HR: HCPCS

## 2021-01-04 PROCEDURE — 250N000011 HC RX IP 250 OP 636: Performed by: SURGERY

## 2021-01-04 PROCEDURE — 96361 HYDRATE IV INFUSION ADD-ON: CPT

## 2021-01-04 PROCEDURE — 80048 BASIC METABOLIC PNL TOTAL CA: CPT | Performed by: SURGERY

## 2021-01-04 PROCEDURE — 250N000013 HC RX MED GY IP 250 OP 250 PS 637: Performed by: HOSPITALIST

## 2021-01-04 PROCEDURE — 96372 THER/PROPH/DIAG INJ SC/IM: CPT | Performed by: SURGERY

## 2021-01-04 PROCEDURE — 250N000011 HC RX IP 250 OP 636: Performed by: HOSPITALIST

## 2021-01-04 PROCEDURE — 250N000013 HC RX MED GY IP 250 OP 250 PS 637: Performed by: SURGERY

## 2021-01-04 PROCEDURE — 258N000003 HC RX IP 258 OP 636: Performed by: SURGERY

## 2021-01-04 PROCEDURE — 36415 COLL VENOUS BLD VENIPUNCTURE: CPT | Performed by: SURGERY

## 2021-01-04 PROCEDURE — 99226 PR SUBSEQUENT OBSERVATION CARE,LEVEL III: CPT | Performed by: HOSPITALIST

## 2021-01-04 RX ORDER — SODIUM CHLORIDE AND POTASSIUM CHLORIDE 150; 450 MG/100ML; MG/100ML
INJECTION, SOLUTION INTRAVENOUS CONTINUOUS
Status: DISCONTINUED | OUTPATIENT
Start: 2021-01-04 | End: 2021-01-06

## 2021-01-04 RX ORDER — METOPROLOL SUCCINATE 25 MG/1
25 TABLET, EXTENDED RELEASE ORAL DAILY
Status: DISCONTINUED | OUTPATIENT
Start: 2021-01-05 | End: 2021-01-08

## 2021-01-04 RX ORDER — ACETAMINOPHEN 500 MG
1000 TABLET ORAL 3 TIMES DAILY
Status: DISCONTINUED | OUTPATIENT
Start: 2021-01-04 | End: 2021-01-08

## 2021-01-04 RX ORDER — CEFTRIAXONE 1 G/1
1 INJECTION, POWDER, FOR SOLUTION INTRAMUSCULAR; INTRAVENOUS EVERY 24 HOURS
Status: COMPLETED | OUTPATIENT
Start: 2021-01-04 | End: 2021-01-06

## 2021-01-04 RX ADMIN — ASPIRIN 81 MG 81 MG: 81 TABLET ORAL at 11:18

## 2021-01-04 RX ADMIN — ACETAMINOPHEN 1000 MG: 500 TABLET, FILM COATED ORAL at 20:49

## 2021-01-04 RX ADMIN — ENOXAPARIN SODIUM 40 MG: 40 INJECTION SUBCUTANEOUS at 11:18

## 2021-01-04 RX ADMIN — POTASSIUM CHLORIDE, DEXTROSE MONOHYDRATE AND SODIUM CHLORIDE: 150; 5; 450 INJECTION, SOLUTION INTRAVENOUS at 04:08

## 2021-01-04 RX ADMIN — PROCHLORPERAZINE EDISYLATE 5 MG: 5 INJECTION INTRAMUSCULAR; INTRAVENOUS at 18:02

## 2021-01-04 RX ADMIN — POTASSIUM CHLORIDE AND SODIUM CHLORIDE: 450; 150 INJECTION, SOLUTION INTRAVENOUS at 13:13

## 2021-01-04 RX ADMIN — ONDANSETRON 4 MG: 4 TABLET, ORALLY DISINTEGRATING ORAL at 09:58

## 2021-01-04 RX ADMIN — POLYETHYLENE GLYCOL 3350 17 G: 17 POWDER, FOR SOLUTION ORAL at 11:18

## 2021-01-04 RX ADMIN — SENNOSIDES AND DOCUSATE SODIUM 1 TABLET: 8.6; 5 TABLET ORAL at 09:37

## 2021-01-04 RX ADMIN — SENNOSIDES AND DOCUSATE SODIUM 1 TABLET: 8.6; 5 TABLET ORAL at 20:50

## 2021-01-04 RX ADMIN — CEFTRIAXONE 1 G: 1 INJECTION, POWDER, FOR SOLUTION INTRAMUSCULAR; INTRAVENOUS at 11:19

## 2021-01-04 RX ADMIN — ONDANSETRON 4 MG: 4 TABLET, ORALLY DISINTEGRATING ORAL at 16:56

## 2021-01-04 NOTE — PROGRESS NOTES
Care Management Follow Up    Length of Stay (days): 0    Expected Discharge Date: 01/05/21     Concerns to be Addressed: discharge planning, care coordination/care conferences     Patient plan of care discussed at interdisciplinary rounds: Yes    Anticipated Discharge Disposition:  Return to [facility]     Anticipated Discharge Services: None  Anticipated Discharge DME: None    Referrals Placed by CM/SW: External Care Coordination  Private pay costs discussed: Not applicable    Additional Information:  Spoke with nursing director Yadira at [facility] [phone] . Patient lives in her own apartment, independent with a walker for mobility. Nursing states that they are able to provide for patient needs currently, but anticipate patient needing memory care in the future. Patient is not a flight risk.   New or changed medications to be e-scribed to [facility].  Nursing at [facility] requesting home care RN orders at discharge for post-op follow up for surgical site and monitoring oral intake. [facility] typically uses [facility].   Patient is able to return to [facility] if at baseline mobility, up independently with walker.  Covid results from 1/3 faxed to [facility] at [phone].       Magalie Valiente, RN      Magalie Valiente RN Case Manager  Inpatient Care Coordination  [facility]   [phone]

## 2021-01-04 NOTE — PLAN OF CARE
PRIMARY DIAGNOSIS: s/p lap darrell  OUTPATIENT/OBSERVATION GOALS TO BE MET BEFORE DISCHARGE:  1. Stable vital signs Yes  2. Tolerating diet:No  3. Pain controlled with oral pain medications:  Yes  4. Positive bowel sounds:  Yes  5. Voiding without difficulty:  Yes  6. Able to ambulate:  Yes  7. Provider specific discharge goals met:  No    Discharge Planner Nurse   Safe discharge environment identified: Yes  Barriers to discharge: Yes       Entered by: Ellie Gustafson 01/04/2021      Please review provider order for any additional goals.   Nurse to notify provider when observation goals have been met and patient is ready for discharge.

## 2021-01-04 NOTE — PROGRESS NOTES
[facility]    Medicine Progress Note - Hospitalist Service       Date of Admission:  1/2/2021  Assessment & Plan       89 yo with history advanced dementia, hard of hearing, diastolic dysfunction, bradycardia with pacemaker, parox a fib, HTN admitted from [facility] on 1/2/2020 with nausea and vomiting.    Nausea and vomiting due to cholelithiasis and cholecystitis    - initially felt to be due to possible UTI (see below)    - on my exam she had RUQ tenderness, US was done, cholelithiasis seen with stone in gallbladder neck   -I spoke with surgery and patient was taken to the OR yesterday    - POD #1 lap darrell (found to have impacted/infected stone in gallbladder neck    - patient has some abdo tenderness today, presumably from surgery: ordered scheduled Tylenol    - lost IV access, push oral fluids    - PRN anti-emetics: advance diet as able    - likely will need to remain here tonight due to her advanced dementia/need to advance diet prior to discharge    Weakness    - will have nursing assess    - PT has seen    - patient at her baseline mobility    Possible UTI    - UA appeared dirty in ED, no culture was sent    - started on ceftriaxone    - no previous culture data    - will continue for 5 days course    Advanced dementia    - at her baseline    - lived at [facility]    Pacemaker    - was placed in [place]    - appears to be due to bradycardia    Parox a fib    - has been seen by cards    - no anticoagulation due to fall risk    - cont pta Toprol    HTN    - cont pta Toprol    - has been paul, so will decrease Toprol to 25mg    Called daughter and updated her    DNR/DNI: confirmed with patient's facility paperwork.     Diet: Low Fat Diet    DVT Prophylaxis: Enoxaparin (Lovenox) SQ  Mccurdy Catheter: not present  Code Status: No CPR- Do NOT Intubate           Disposition Plan   Expected discharge: Tomorrow, recommended to prior living arrangement once tolerating  "PO.  Entered: John Barnes MD 01/04/2021, 10:07 AM       The patient's care was discussed with the Patient and Patient's Familyand bedside nurse    John Barnes MD  Hospitalist Service    Contact information available via [facility] Paging/Directory    ______________________________________________________________________    Interval History   Pocket talker used. Patient in bed. I explained she was in the hospital and had an infection of her gallbladder and needed this removed. She seemed to understand. She endorses nausea. Denied pain, but then later stated \"my tummy hurts\"    Data reviewed today: I reviewed all medications, new labs and imaging results over the last 24 hours. I personally reviewed the pending US image(s) showing pending.    Physical Exam   Vital Signs: Temp: 96.7  F (35.9  C) Temp src: Oral BP: (!) 147/50 Pulse: 55   Resp: 16 SpO2: 94 % O2 Device: None (Room air) Oxygen Delivery: 2 LPM  Weight: 182 lbs 0 oz  Constitutional: awake, alert, cooperative, no apparent distress, and appears stated age  Eyes: Lids and lashes normal, pupils equal, round and reactive to light, extra ocular muscles intact, sclera clear, conjunctiva normal  ENT: Normocephalic, without obvious abnormality, atraumatic, sinuses nontender on palpation, external ears without lesions, oral pharynx with moist mucous membranes, tonsils without erythema or exudates, gums normal and good dentition.  Respiratory: No increased work of breathing, good air exchange, clear to auscultation bilaterally, no crackles or wheezing  Cardiovascular: Normal apical impulse, regular rate and rhythm, normal S1 and S2, no S3 or S4, and no murmur noted  GI: soft. Incision sites clean and dry. Has some diffuse tenderness  Skin: no bruising or bleeding  Musculoskeletal: no lower extremity pitting edema present    Data   Recent Labs   Lab 01/04/21  0538 01/03/21  0551 01/02/21 2047   WBC  --  9.9 11.7*   HGB  --  12.4 " 14.3   MCV  --  102* 103*   PLT  --  175 213    142 141   POTASSIUM 3.6 3.2* 3.8   CHLORIDE 110* 111* 106   CO2 25 24 24   BUN 17 21 22   CR 0.74 0.78 0.86   ANIONGAP 4 7 11   ALESHA 7.8* 8.1* 9.1   * 107* 102*   ALBUMIN  --   --  3.1*   PROTTOTAL  --   --  6.8   BILITOTAL  --   --  0.9   ALKPHOS  --   --  92   ALT  --   --  26   AST  --   --  33   TROPI  --   --  <0.015     Recent Results (from the past 24 hour(s))   US Abdomen Limited    Narrative    US ABDOMEN LIMITED 1/3/2021 10:47 AM    CLINICAL HISTORY: Right upper quadrant pain with nausea.    TECHNIQUE: Limited abdominal ultrasound.    COMPARISON: None.    FINDINGS:    GALLBLADDER: Negative sonographic Whitehead's sign. Multiple gallstones  within the gallbladder lumen. The gallbladder neck stone is also  identified. No gallbladder wall thickening is demonstrated.    BILE DUCTS: There is no biliary dilatation. The common duct measures  7mm.    LIVER: Normal where seen.    RIGHT KIDNEY: No hydronephrosis.    PANCREAS: The visualized portions of the pancreas are normal.    No ascites.      Impression    IMPRESSION:  1.  Cholelithiasis. A gallbladder neck stone is noted. Negative  sonographic Whitehead's sign. The common duct is 7 mm.  2.  No other acute abnormality.    DENYS INFANTE MD

## 2021-01-04 NOTE — PLAN OF CARE
PRIMARY DIAGNOSIS: s/p lap darrell  OUTPATIENT/OBSERVATION GOALS TO BE MET BEFORE DISCHARGE:  1. Stable vital signs Yes  2. Tolerating diet:No- not eating  3. Pain controlled with oral pain medications:  Yes and ice  4. Positive bowel sounds:  Yes  5. Voiding without difficulty:  Yes- incontinent  6. Able to ambulate:  Yes- SBA to A1  7. Provider specific discharge goals met:  No- needs to tolerate diet    Discharge Planner Nurse   Safe discharge environment identified: Yes  Barriers to discharge: Yes       Entered by: Ellie Gustafson 01/04/2021      Please review provider order for any additional goals.   Nurse to notify provider when observation goals have been met and patient is ready for discharge.

## 2021-01-04 NOTE — PLAN OF CARE
[id number]    Pt in surgery and PACU for majority of shift.    Arrived to room 213 from PACU at 1901 via cart, transferred to bed via hover mat without difficulty,  dressing CDI, CMS intact, IV patent and infusing. Frequent VS started. Pt oxygen saturation upper 90s on RA. Pt awake, responding appropriately. [employer].

## 2021-01-04 NOTE — PLAN OF CARE
PRIMARY DIAGNOSIS: BILIARY COLIC/UNCOMPLICATED EARLY ACUTE CHOLECYSTITIS  OUTPATIENT/OBSERVATION GOALS TO BE MET BEFORE DISCHARGE:    1. Pain status: Pain free.  2. Stable vital signs and labs (if performed) at disposition: Yes  3. Tolerating adequate PO diet: No  4. Successful cholecystectomy or clear follow up plan with General Surgery team if immediate surgery not performed Yes  5. ADLs back to baseline?  No  6. Activity and level of assistance: Ax1 GB, walker   7. Barriers to discharge noted Yes - diet     Discharge Planner Nurse   Safe discharge environment identified: No  Barriers to discharge: Yes       Entered by: Bentley Villasenor 01/04/2021 12:16 AM     Please review provider order for any additional goals.   Nurse to notify provider when observation goals have been met and patient is ready for discharge.

## 2021-01-04 NOTE — PLAN OF CARE
PRIMARY DIAGNOSIS: BILIARY COLIC/UNCOMPLICATED EARLY ACUTE CHOLECYSTITIS  OUTPATIENT/OBSERVATION GOALS TO BE MET BEFORE DISCHARGE:    1. Pain status: Pain free.  2. Stable vital signs and labs (if performed) at disposition: Yes  3. Tolerating adequate PO diet: No  4. Successful cholecystectomy or clear follow up plan with General Surgery team if immediate surgery not performed Yes  5. ADLs back to baseline?  No  6. Activity and level of assistance: Ax1 gb, walker  7. Barriers to discharge noted Yes diet, PT     Discharge Planner Nurse   Safe discharge environment identified: No  Barriers to discharge: Yes    Pt removed capno multiple times overnight, refused to put back on by staff. Capno was taken off overnight. Pt on RA and VSS.          Entered by: Bentley Villasenor 01/04/2021 6:08 AM     Please review provider order for any additional goals.   Nurse to notify provider when observation goals have been met and patient is ready for discharge.

## 2021-01-04 NOTE — UTILIZATION REVIEW
Concurrent stay review; Secondary Review Determination     Under the authority of the Utilization Management Committee, the utilization review process indicated a secondary review on the above patient.  The review outcome is based on review of the medical records, discussions with staff, and applying clinical experience noted on the date of the review.          (x) Observation Status Appropriate - Concurrent stay review    RATIONALE FOR DETERMINATION   88 yo female with dementia paroxysmal atrial fibrillation, hypertension, bradycardia with pacemaker who was initially admitted with concern for nausea and vomiting related to possible UTI. Abdominal pain on exam led to imaging which revealed stone in gallbladder neck. Underwent laparoscopic cholecystectomy on 1/3/21. No evidence for sepsis. Slow to advance diet given dementia. Anticipate discharge to prior living arrangement tomorrow.     Patient is clinically improving and there is no clear indication to change patient's status to inpatient. The severity of illness, intensity of service provided, expected LOS and risk for adverse outcome make the care appropriate for observation.    This document was produced using voice recognition software     The information on this document is developed by the utilization review team in order for the business office to ensure compliance.  This only denotes the appropriateness of proper admission status and does not reflect the quality of care rendered.         The definitions of Inpatient Status and Observation Status used in making the determination above are those provided in the CMS Coverage Manual, Chapter 1 and Chapter 6, section 70.4.      Sincerely,   Deloris Fatima MD  Utilization Review  Physician Advisor  [facility].

## 2021-01-04 NOTE — PROGRESS NOTES
"[facility]   General Surgery Progress Note          Assessment and Plan:   Assessment:   POD#1 s/p Procedure(s):  CHOLECYSTECTOMY, LAPAROSCOPIC  History of dementia      Plan:   -Low fat diet as tolerated  -Pain Mgmt: Tylenol  -Increase activity as tolerated  -Discharge per hospitalist. Instructions in chart. Follow up phone call in 2-3 weeks.         Interval History:   Sleeping in bed. Report from RN that she is tolerating clears, pain controlled with tylenol and she is voiding independently. No BM yet.         Physical Exam:   Blood pressure (!) 147/50, pulse 55, temperature 96.7  F (35.9  C), temperature source Oral, resp. rate 16, height 1.651 m (5' 5\"), weight 82.6 kg (182 lb), SpO2 94 %.    I/O last 3 completed shifts:  In: 500 [I.V.:500]  Out: -     Abdomen: soft, +BS  Inc(s) - clean, dry, intact with gllue, no erythema, +ecchymosis on lateral port sites            Data:     Recent Labs   Lab Test 01/03/21  0551 01/02/21  2047 07/26/18  0743   HGB 12.4 14.3 12.4   WBC 9.9 11.7* 14.5*          Casey Mcwilliams PA-C    Agree with above. Minimally conversant (said only \"Radha Reyes\"), but appears comfortable. Abdomen soft, minimally tender, port sites mildly ecchymotic, but otherwise intact. ADAT and may discharge once tolerating diet.     Umm Flanagan MD  "

## 2021-01-04 NOTE — PLAN OF CARE
PRIMARY DIAGNOSIS: BILIARY COLIC/UNCOMPLICATED EARLY ACUTE CHOLECYSTITIS  OUTPATIENT/OBSERVATION GOALS TO BE MET BEFORE DISCHARGE:    1. Pain status: Pain free.  2. Stable vital signs and labs (if performed) at disposition: Yes  3. Tolerating adequate PO diet: No  4. Successful cholecystectomy or clear follow up plan with General Surgery team if immediate surgery not performed Yes  5. ADLs back to baseline?  No  6. Activity and level of assistance: Ax1 GB, walker   7. Barriers to discharge noted Yes diet, placement possible     Discharge Planner Nurse   Safe discharge environment identified:  LESTER  Barriers to discharge: Yes       Entered by: Bentley Villasenor 01/03/2021 8:21 PM     Please review provider order for any additional goals.   Nurse to notify provider when observation goals have been met and patient is ready for discharge.

## 2021-01-05 PROBLEM — K81.0 ACUTE CHOLECYSTITIS: Status: ACTIVE | Noted: 2021-01-05

## 2021-01-05 LAB
ALBUMIN SERPL-MCNC: 2.5 G/DL (ref 3.4–5)
ALP SERPL-CCNC: 80 U/L (ref 40–150)
ALT SERPL W P-5'-P-CCNC: 110 U/L (ref 0–50)
ANION GAP SERPL CALCULATED.3IONS-SCNC: 4 MMOL/L (ref 3–14)
AST SERPL W P-5'-P-CCNC: 165 U/L (ref 0–45)
BILIRUB DIRECT SERPL-MCNC: 0.4 MG/DL (ref 0–0.2)
BILIRUB SERPL-MCNC: 0.9 MG/DL (ref 0.2–1.3)
BUN SERPL-MCNC: 17 MG/DL (ref 7–30)
CALCIUM SERPL-MCNC: 7.9 MG/DL (ref 8.5–10.1)
CHLORIDE SERPL-SCNC: 110 MMOL/L (ref 94–109)
CO2 SERPL-SCNC: 25 MMOL/L (ref 20–32)
COPATH REPORT: NORMAL
CREAT SERPL-MCNC: 0.71 MG/DL (ref 0.52–1.04)
GFR SERPL CREATININE-BSD FRML MDRD: 75 ML/MIN/{1.73_M2}
GLUCOSE SERPL-MCNC: 93 MG/DL (ref 70–99)
INTERPRETATION ECG - MUSE: NORMAL
POTASSIUM SERPL-SCNC: 3.6 MMOL/L (ref 3.4–5.3)
PROT SERPL-MCNC: 5.4 G/DL (ref 6.8–8.8)
SODIUM SERPL-SCNC: 139 MMOL/L (ref 133–144)

## 2021-01-05 PROCEDURE — 36415 COLL VENOUS BLD VENIPUNCTURE: CPT | Performed by: HOSPITALIST

## 2021-01-05 PROCEDURE — 80076 HEPATIC FUNCTION PANEL: CPT | Performed by: HOSPITALIST

## 2021-01-05 PROCEDURE — 120N000004 HC R&B MS OVERFLOW

## 2021-01-05 PROCEDURE — 250N000011 HC RX IP 250 OP 636: Performed by: SURGERY

## 2021-01-05 PROCEDURE — 99232 SBSQ HOSP IP/OBS MODERATE 35: CPT | Performed by: HOSPITALIST

## 2021-01-05 PROCEDURE — 250N000013 HC RX MED GY IP 250 OP 250 PS 637: Performed by: SURGERY

## 2021-01-05 PROCEDURE — G0378 HOSPITAL OBSERVATION PER HR: HCPCS

## 2021-01-05 PROCEDURE — 99207 PR CDG-MDM COMPONENT: MEETS LOW - DOWN CODED: CPT | Performed by: HOSPITALIST

## 2021-01-05 PROCEDURE — 80048 BASIC METABOLIC PNL TOTAL CA: CPT | Performed by: HOSPITALIST

## 2021-01-05 PROCEDURE — 250N000011 HC RX IP 250 OP 636: Performed by: HOSPITALIST

## 2021-01-05 PROCEDURE — 96361 HYDRATE IV INFUSION ADD-ON: CPT

## 2021-01-05 PROCEDURE — 250N000013 HC RX MED GY IP 250 OP 250 PS 637: Performed by: HOSPITALIST

## 2021-01-05 PROCEDURE — 96376 TX/PRO/DX INJ SAME DRUG ADON: CPT

## 2021-01-05 RX ADMIN — ASPIRIN 81 MG 81 MG: 81 TABLET ORAL at 07:48

## 2021-01-05 RX ADMIN — CEFTRIAXONE 1 G: 1 INJECTION, POWDER, FOR SOLUTION INTRAMUSCULAR; INTRAVENOUS at 11:27

## 2021-01-05 RX ADMIN — ENOXAPARIN SODIUM 40 MG: 40 INJECTION SUBCUTANEOUS at 11:36

## 2021-01-05 RX ADMIN — POTASSIUM CHLORIDE AND SODIUM CHLORIDE: 450; 150 INJECTION, SOLUTION INTRAVENOUS at 02:49

## 2021-01-05 RX ADMIN — METOPROLOL SUCCINATE 25 MG: 25 TABLET, FILM COATED, EXTENDED RELEASE ORAL at 07:48

## 2021-01-05 RX ADMIN — ACETAMINOPHEN 1000 MG: 500 TABLET, FILM COATED ORAL at 13:49

## 2021-01-05 RX ADMIN — SENNOSIDES AND DOCUSATE SODIUM 1 TABLET: 8.6; 5 TABLET ORAL at 21:39

## 2021-01-05 RX ADMIN — POTASSIUM CHLORIDE AND SODIUM CHLORIDE: 450; 150 INJECTION, SOLUTION INTRAVENOUS at 17:43

## 2021-01-05 RX ADMIN — ACETAMINOPHEN 1000 MG: 500 TABLET, FILM COATED ORAL at 07:48

## 2021-01-05 RX ADMIN — ONDANSETRON 4 MG: 2 INJECTION INTRAMUSCULAR; INTRAVENOUS at 02:54

## 2021-01-05 RX ADMIN — POLYETHYLENE GLYCOL 3350 17 G: 17 POWDER, FOR SOLUTION ORAL at 07:53

## 2021-01-05 RX ADMIN — ACETAMINOPHEN 1000 MG: 500 TABLET, FILM COATED ORAL at 21:38

## 2021-01-05 RX ADMIN — ONDANSETRON 4 MG: 2 INJECTION INTRAMUSCULAR; INTRAVENOUS at 11:36

## 2021-01-05 RX ADMIN — SENNOSIDES AND DOCUSATE SODIUM 1 TABLET: 8.6; 5 TABLET ORAL at 07:48

## 2021-01-05 RX ADMIN — PROCHLORPERAZINE EDISYLATE 5 MG: 5 INJECTION INTRAMUSCULAR; INTRAVENOUS at 05:13

## 2021-01-05 NOTE — PLAN OF CARE
"PRIMARY DIAGNOSIS: s/p lap darrell (POD2)  OUTPATIENT/OBSERVATION GOALS TO BE MET BEFORE DISCHARGE:  1. Stable vital signs Yes  2. Tolerating diet:No - refusing to eat- nausea intermittent only taking sips of water with meds  3. Pain controlled with oral pain medications:  Yes- difficult to assess but scheduled tylenol and ice seem to be controlling pain- no non-verbal indicators present when given  4. Positive bowel sounds:  Yes - LESTER if passing flatus, no BM yet  5. Voiding without difficulty:  Yes - difficult to assess how much d/t urinary incontinence  6. Able to ambulate:  Yes- SBA w/gaitblet and walker- up in chair this afternoon  7. Provider specific discharge goals met:  No    Discharge Planner Nurse   Safe discharge environment identified: Yes  Barriers to discharge: Yes       Entered by: Ellie Gustafson 01/05/2021      Please review provider order for any additional goals.   Nurse to notify provider when observation goals have been met and patient is ready for discharge.    Continues to refuse to eat. Fluids running at 75. Intermittent nausea-no emesis but spitting into emesis bag. IV zofran given x1 this thift. NPO at midnight for possible EUS/ERCP tomorrow. BP (!) 150/69 (BP Location: Left arm)   Pulse 60   Temp 96.9  F (36.1  C) (Oral)   Resp 16   Ht 1.651 m (5' 5\")   Wt 82.6 kg (182 lb)   SpO2 98%   BMI 30.29 kg/m    "

## 2021-01-05 NOTE — PROGRESS NOTES
Care Management Follow Up    Length of Stay (days): 0    Expected Discharge Date: 01/06/21     Concerns to be Addressed: discharge planning, care coordination/care conferences     Patient plan of care discussed at interdisciplinary rounds: Yes    Additional Information:  CTS called and spoke with nurse Jannette from [facility] [phone],  She said pt is from [facility] not  unit.  Currently pt gets assistance with toile ting and medication administration otherwise independent.  Mag would like to come in and assess pt to make sure they can meet her needs.  [facility] consulting nurse manager. They require pt to be back before 2 PM and would like orders night before if possible or as soon as possible in am. Nursing supervior approved for Mag to come into hospital for assessment.  Called and left VM informing Mag she can come in.     Stefanie Hardin RN, BSN, PHN, CTS  Care Coordinator  [facility]  [phone]

## 2021-01-05 NOTE — PROGRESS NOTES
[facility]    Medicine Progress Note - Hospitalist Service       Date of Admission:  1/2/2021  Assessment & Plan       88 yo with history advanced dementia, hard of hearing, diastolic dysfunction, bradycardia with pacemaker, parox a fib, HTN admitted from [facility] on 1/2/2020 with nausea and vomiting.    Nausea and vomiting due to cholelithiasis and cholecystitis    - initially felt to be due to possible UTI (see below)    - on my exam she had RUQ tenderness, US was done, cholelithiasis seen with stone in gallbladder neck   -I spoke with surgery and patient was taken to the OR yesterday    - POD #1 lap darrell (found to have impacted/infected stone in gallbladder neck    - patient has some abdo tenderness today, presumably from surgery: ordered scheduled Tylenol    - lost IV access, push oral fluids    - PRN anti-emetics: advance diet as able    - patient has continued to refuse PO due to nausea and likely some pain (difficult to tell due to her baseline)    - LFTs (ALT/AST only) elevated today    - already spoke with Dr. Lu (GI). Will try to pursue EUS. Will attempt MRCP (may not be able to to do due to pacemaker). Waiting to hear back from GI as to when EUS could be done  Addendum: spoke again with Dr. Lu. EUS tomorrow at 3pm    Weakness    - will have nursing assess    - PT has seen    - patient at her baseline mobility    Possible UTI    - UA appeared dirty in ED, no culture was sent    - started on ceftriaxone    - no previous culture data    - will continue for 5 days course (today is Day # 4)    Advanced dementia    - at her baseline    - lived at [facility]    Pacemaker    - was placed in [place]    - appears to be due to bradycardia    Parox a fib    - has been seen by cards    - no anticoagulation due to fall risk    - cont pta Toprol    HTN    - cont pta Toprol    - has been paul, so will decrease Toprol to 25mg    Called daughter and updated  her    Admit to inpatient due to rising LFTs, continued nausea and pain, possible need for EUS for possible choledocholithiasis    DNR/DNI: confirmed with patient's facility paperwork.     Diet: Low Fat Diet    DVT Prophylaxis: Enoxaparin (Lovenox) SQ  Mccurdy Catheter: not present  Code Status: No CPR- Do NOT Intubate           Disposition Plan   Expected discharge: Tomorrow, recommended to prior living arrangement once tolerating PO.  Entered: John Barnes MD 01/05/2021, 10:07 AM       The patient's care was discussed with the Patient and Patient's Familyand bedside nurse    John Barnes MD  Hospitalist Service  [facility]  Contact information available via [facility] Paging/Directory    ______________________________________________________________________    Interval History   Pocket talker used. Patient in bed. I was able to assist her to chair. She states she does not want to eat. She endorses some nausea and some pain (she points to various parts of her abdomen)    Data reviewed today: I reviewed all medications, new labs and imaging results over the last 24 hours. I personally reviewed the pending US image(s) showing pending.    Physical Exam   Vital Signs: Temp: 96.3  F (35.7  C) Temp src: Axillary BP: (!) 145/65 Pulse: 61   Resp: 16 SpO2: 98 % O2 Device: None (Room air)    Weight: 182 lbs 0 oz  Constitutional: awake, alert, cooperative, no apparent distress, and appears stated age  Eyes: Lids and lashes normal, pupils equal, round and reactive to light, extra ocular muscles intact, sclera clear, conjunctiva normal  ENT: Normocephalic, without obvious abnormality, atraumatic, sinuses nontender on palpation, external ears without lesions, oral pharynx with moist mucous membranes, tonsils without erythema or exudates, gums normal and good dentition.  Respiratory: No increased work of breathing, good air exchange, clear to auscultation bilaterally, no crackles or  wheezing  Cardiovascular: Normal apical impulse, regular rate and rhythm, normal S1 and S2, no S3 or S4, and no murmur noted  GI: soft. Incision sites clean and dry. Has some diffuse tenderness  Skin: no bruising or bleeding  Musculoskeletal: no lower extremity pitting edema present    Data   Recent Labs   Lab 01/05/21  0553 01/04/21  0538 01/03/21  0551 01/02/21  2047   WBC  --   --  9.9 11.7*   HGB  --   --  12.4 14.3   MCV  --   --  102* 103*   PLT  --   --  175 213    139 142 141   POTASSIUM 3.6 3.6 3.2* 3.8   CHLORIDE 110* 110* 111* 106   CO2 25 25 24 24   BUN 17 17 21 22   CR 0.71 0.74 0.78 0.86   ANIONGAP 4 4 7 11   ALESHA 7.9* 7.8* 8.1* 9.1   GLC 93 153* 107* 102*   ALBUMIN 2.5*  --   --  3.1*   PROTTOTAL 5.4*  --   --  6.8   BILITOTAL 0.9  --   --  0.9   ALKPHOS 80  --   --  92   *  --   --  26   *  --   --  33   TROPI  --   --   --  <0.015     No results found for this or any previous visit (from the past 24 hour(s)).

## 2021-01-05 NOTE — PLAN OF CARE
PRIMARY DIAGNOSIS: Lap Edilia   OUTPATIENT/OBSERVATION GOALS TO BE MET BEFORE DISCHARGE:  1. Stable vital signs Yes  2. Tolerating diet:No  3. Pain controlled with oral pain medications:  Yes  4. Positive bowel sounds:  Yes  5. Voiding without difficulty:  Yes  6. Able to ambulate:  Yes  7. Provider specific discharge goals met:  No    Discharge Planner Nurse   Safe discharge environment identified: Yes  Barriers to discharge: Yes- diet        Entered by: Bentley Villasenor 01/05/2021 12:56 AM     Please review provider order for any additional goals.   Nurse to notify provider when observation goals have been met and patient is ready for discharge.

## 2021-01-05 NOTE — PLAN OF CARE
PRIMARY DIAGNOSIS: s/p lap darrell  OUTPATIENT/OBSERVATION GOALS TO BE MET BEFORE DISCHARGE:  1. Stable vital signs Yes  2. Tolerating diet:No - has not eaten on shift - having nausea   3. Pain controlled with oral pain medications:  Yes  4. Positive bowel sounds:  Yes  5. Voiding without difficulty:  Yes - incontinent  6. Able to ambulate:  Yes A x1  7. Provider specific discharge goals met:  Yes    Discharge Planner Nurse   Safe discharge environment identified: Yes  Barriers to discharge: Yes       Entered by: Jon Hearn 01/04/2021       Please review provider order for any additional goals.   Nurse to notify provider when observation goals have been met and patient is ready for discharge.

## 2021-01-05 NOTE — PLAN OF CARE
"PRIMARY DIAGNOSIS: s/p lap darrell  OUTPATIENT/OBSERVATION GOALS TO BE MET BEFORE DISCHARGE:  1. Stable vital signs Yes  2. Tolerating diet:No - has not eaten on shift - having nausea   3. Pain controlled with oral pain medications:  Yes  4. Positive bowel sounds:  Yes  5. Voiding without difficulty:  Yes - incontinent  6. Able to ambulate:  Yes A x1  7. Provider specific discharge goals met:  Yes    Discharge Planner Nurse   Safe discharge environment identified: Yes  Barriers to discharge: Yes       Entered by: Jon Hearn 01/04/2021     Pt alert to self - She is very [place], using pocket talker. She has been feeling nauseous this evening, given zofran and compazine with some improvement. Still refused to eat anything all night. Encouraging drinking water. Up Assist x1.  1/2 NS w/ K @ 75 ml/hr. Lap sites WNL. voiding ok - incontinent. Receiving rocephin for uti. Will cont supportive acres.  /47 (BP Location: Left arm)   Pulse 60   Temp 97  F (36.1  C) (Axillary)   Resp 16   Ht 1.651 m (5' 5\")   Wt 82.6 kg (182 lb)   SpO2 97%   BMI 30.29 kg/m        Please review provider order for any additional goals.   Nurse to notify provider when observation goals have been met and patient is ready for discharge.  "

## 2021-01-05 NOTE — PLAN OF CARE
"PRIMARY DIAGNOSIS: s/p lap darrell (POD2)  OUTPATIENT/OBSERVATION GOALS TO BE MET BEFORE DISCHARGE:  1. Stable vital signs Yes  2. Tolerating diet:No - refusing to eat- nausea intermittent  3. Pain controlled with oral pain medications:  Yes- difficult to assess but scheduled tylenol and ice seem to be controlling pain- no non-verbal indicators present when given  4. Positive bowel sounds:  Yes - LESTER if passing flatus, no BM yet  5. Voiding without difficulty:  Yes - difficult to assess how much d/t urinary incontinence  6. Able to ambulate:  Yes- SBA w/gaitblet and walker  7. Provider specific discharge goals met:  No    Discharge Planner Nurse   Safe discharge environment identified: Yes  Barriers to discharge: Yes       Entered by: Ellie Gustafson 01/05/2021 7:56 AM     Please review provider order for any additional goals.   Nurse to notify provider when observation goals have been met and patient is ready for discharge.    Some nausea this am- no emesis just spitting into emesis bag- encouraged deep breathing,aromatherapy utilized, and small sips water given with some noted improvement. Some pain in abd- ice applied and scheduled Tylenol given-will continue to monitor.  BP (!) 145/65 (BP Location: Left arm)   Pulse 61   Temp 96.3  F (35.7  C) (Axillary)   Resp 16   Ht 1.651 m (5' 5\")   Wt 82.6 kg (182 lb)   SpO2 98%   BMI 30.29 kg/m     "

## 2021-01-05 NOTE — PLAN OF CARE
PRIMARY DIAGNOSIS: BILIARY COLIC/UNCOMPLICATED EARLY ACUTE CHOLECYSTITIS  OUTPATIENT/OBSERVATION GOALS TO BE MET BEFORE DISCHARGE:    1. Pain status: Pain free.  2. Stable vital signs and labs (if performed) at disposition: Yes  3. Tolerating adequate PO diet: No  4. Successful cholecystectomy or clear follow up plan with General Surgery team if immediate surgery not performed Yes  5. ADLs back to baseline?  Yes  6. Activity and level of assistance: Ax1 GB, walker.   7. Barriers to discharge noted Yes - diet     Discharge Planner Nurse   Safe discharge environment identified: Yes  Barriers to discharge: Yes       Entered by: Bentley Villasenor 01/05/2021 3:09 AM     Please review provider order for any additional goals.   Nurse to notify provider when observation goals have been met and patient is ready for discharge.

## 2021-01-05 NOTE — CONSULTS
[facility]  [facility]  Gastroenterology Consultation    Leighann Regan  [facility]  [address]   [place] [facility] [address]  89 year old female    Admission Date/Time: 1/2/2021  Primary Care Provider: No Ref-Primary, Physician    We were asked to see the patient in consultation by Dr. Barnes for evaluation of RUQ pain and elevated LFTs.      HPI:  Leighann Regan is a 89-year-old female with PMH including dementia, hard of hearing, diastolic dysfunction, bradycardia with pacemaker, paroxysmal a-fib, and hypertension who was admitted 1/2/21 with nausea, vomiting, and RUQ pain. History is limited from the patient due to dementia. Initially symptoms were thought to be related to UTI, but her pain worsened and she had RUQ US showing cholelithiasis with a stone in the gallbladder neck. No biliary ductal dilation. She underwent cholecystectomy on 1/3/20. Postoperatively, patient continues to have abdominal pain and nausea. Patient points to epigastric area as the location of her pain. Notably transaminases are elevated today (, ) with normal alk phos and bilirubin. LFTs all normal on admission. MRCP ordered but unclear if she will be able to get this due to pacemaker.    ROS: Unable to obtain 2/2 dementia.    MEDICATIONS: No current facility-administered medications on file prior to encounter.        acetaminophen (TYLENOL) 500 MG tablet, Take 1,000 mg by mouth 3 times daily       aspirin 81 MG chewable tablet, Take 81 mg by mouth daily       bacitracin 500 UNIT/GM OINT, Apply topically 2 times daily To left ear abrasion until healed       Vale Protect (EUCERIN) external cream, Apply topically 2 times daily as needed for dry skin or other (reddened skin with toileting until healed)       Calcium Carb-Cholecalciferol 600-800 MG-UNIT TABS, Take 1 tablet by mouth daily       famotidine (PEPCID) 20 MG tablet, Take 20 mg by mouth At Bedtime       furosemide  (LASIX) 20 MG tablet, Take 20 mg by mouth daily       metoprolol succinate (TOPROL-XL) 50 MG 24 hr tablet, Take 50 mg by mouth daily       Multiple Vitamins-Minerals (PRESERVISION AREDS 2) CAPS, Take 1 capsule by mouth daily       [] nitroFURantoin macrocrystal-monohydrate (MACROBID) 100 MG capsule, Take 100 mg by mouth 2 times daily For 5 days       nystatin (MYCOSTATIN) 024447 UNIT/GM external powder, Apply topically 2 times daily as needed Apply to affected areas       potassium chloride ER (KLOR-CON M) 20 MEQ CR tablet, Take 20 mEq by mouth daily       acetaminophen (TYLENOL) 325 MG tablet, Take 650 mg by mouth every 8 hours as needed for mild pain       acetaminophen (TYLENOL) 650 MG suppository, Place 650 mg rectally every 4 hours as needed for fever       bisacodyl (DULCOLAX) 10 MG suppository, Place 10 mg rectally daily as needed for constipation       guaiFENesin (ROBITUSSIN) 100 MG/5ML SYRP, Take 10 mLs by mouth every 4 hours as needed for cough       loperamide (IMODIUM A-D) 2 MG tablet, Take 2 mg by mouth 4 times daily as needed for diarrhea Give 4mg after 1st loose stool then 2mg after each subsequent loose stool. Max 16mg/day.       magnesium hydroxide (MILK OF MAGNESIA) 400 MG/5ML suspension, Take 30 mLs by mouth daily as needed for constipation or heartburn       meclizine (ANTIVERT) 25 MG tablet, Take 25 mg by mouth 3 times daily as needed for dizziness       Neomycin-Bacitracin-Polymyxin (TRIPLE ANTIBIOTIC) 3.5-400-5000 OINT ointment, Externally apply topically 3 times daily as needed       Ostomy Supplies (SKIN PREP WIPES) MISC, as needed (blisters)       senna-docusate (SENOKOT-S;PERICOLACE) 8.6-50 MG per tablet, Take 1 tablet by mouth daily as needed for constipation        ALLERGIES:   Allergies   Allergen Reactions     Codeine      Ibuprofen      Latex      Sulfa Drugs      Tramadol        Past Medical History:   Diagnosis Date     Constipation      Dementia (H)      HTN (hypertension)   "    Macular degeneration      Pacemaker      Seborrheic dermatitis        Past Surgical History:   Procedure Laterality Date     HYSTERECTOMY       LAPAROSCOPIC CHOLECYSTECTOMY N/A 1/3/2021    Procedure: CHOLECYSTECTOMY, LAPAROSCOPIC;  Surgeon: Drake Bailey MD;  Location: [facility] OR       SOCIAL HISTORY:  Social History     Tobacco Use     Smoking status: Never Smoker   Substance Use Topics     Alcohol use: No     Drug use: No       FAMILY HISTORY:  Family History   Problem Relation Age of Onset     Cerebrovascular Disease Mother      Diabetes Mother        PHYSICAL EXAM:   BP (!) 145/65 (BP Location: Left arm)   Pulse 61   Temp 96.3  F (35.7  C) (Axillary)   Resp 16   Ht 1.651 m (5' 5\")   Wt 82.6 kg (182 lb)   SpO2 98%   BMI 30.29 kg/m      Constitutional: No acute distress.  Head: Normocephalic, atraumatic.    Neck: Neck supple. No adenopathy.  Eyes: No scleral icterus.  ENT: Hard of hearing.  Cardiovascular: RRR.  Respiratory: Nonlabored.  Abdomen: Soft, nondistended. Epigastric/RUQ tenderness.  Neuro: CN II-XII grossly intact. No focal deficits.  Extremities: No edema.  Skin: No suspicious lesions, rashes, or jaundice.  Psychiatric: Dementia.      ADDITIONAL COMMENTS:   I reviewed the patient's new clinical lab test results.   Recent Labs   Lab Test 01/03/21  0551 01/02/21 2047 07/26/18  0743   WBC 9.9 11.7* 14.5*   HGB 12.4 14.3 12.4   * 103* 96    213 216     Recent Labs   Lab Test 01/05/21  0553 01/04/21  0538 01/03/21  0551    139 142   POTASSIUM 3.6 3.6 3.2*   CHLORIDE 110* 110* 111*   CO2 25 25 24   BUN 17 17 21   CR 0.71 0.74 0.78   ANIONGAP 4 4 7   ALESHA 7.9* 7.8* 8.1*   GLC 93 153* 107*     Recent Labs   Lab Test 01/05/21  0553 01/02/21 2113 01/02/21 2047 07/26/18  0948 07/25/18  0413   ALBUMIN 2.5*  --  3.1*  --  3.0*   BILITOTAL 0.9  --  0.9  --  1.1   DBIL 0.4*  --   --   --   --    *  --  26  --  18   *  --  33  --  20   ALKPHOS 80  --  92  --  89 "   PROTEIN  --  50*  --  30*  --    LIPASE  --   --   --   --  73         IMAGING/ENDOSCOPY    RUQ US 1/3/21   - Cholelithiasis. A gallbladder neck stone is noted. Negative sonographic Whitehead's sign. The common duct is 7 mm.   - No other acute abnormality.    CONSULTATION ASSESSMENT AND PLAN:    Leighann Regan is an 89-year-old female with PMH as mentioned above who was admitted 1/2/21 with nausea, vomiting, and abdominal pain. Found to have cholelithiasis and gallstone in GB neck and so underwent cholecystectomy on 1/3/21. Still having pain and nausea with new LFT elevations today.    1) RUQ pain / elevated LFTs: Concern for possible choledocholithiasis given continued pain. ALT/AST elevated to 100s; ALP and bilirubin are normal. MRCP ordered but patient may be unable to get this due to pacemaker. Will d/w biliary MD regarding ?need for EUS. Patient not NPO today.       - Trend LFTs.       - MRCP if able.       - Possible EUS/ERCP tomorrow if unable to get MRCP or MRCP shows positive findings. Will make NPO after midnight.    I discussed the patient plan with Dr. Lu. Thank you for asking us to participate in the care of this patient.    Inge Gibson PA-C  [facility] ([facility])

## 2021-01-05 NOTE — PROGRESS NOTES
"[facility]   General Surgery Progress Note          Assessment and Plan:   Assessment:   2 Days Post-Op s/p Procedure(s):  CHOLECYSTECTOMY, LAPAROSCOPIC  History of dementia      Plan:   -Low fat diet - awaiting diet tolerance  -Pain Mgmt: Tylenol  -Encourage ambulation 3-4 x daily  -ok to discharge from surgical perspective once tolerating diet         Interval History:   Resting in bed.  [place].  Denies pain.  Having some nausea last night and this am (per nursing notes).  Unsure if she has been passing gas.  Has not had BM         Physical Exam:   Blood pressure (!) 145/65, pulse 61, temperature 96.3  F (35.7  C), temperature source Axillary, resp. rate 16, height 1.651 m (5' 5\"), weight 82.6 kg (182 lb), SpO2 98 %.    I/O last 3 completed shifts:  In: 100 [P.O.:100]  Out: -     Abdomen: soft, +BS  Inc(s) - clean, dry, intact with gllue, no erythema, +ecchymosis on lateral port sites            Data:     Recent Labs   Lab Test 01/03/21  0551 01/02/21 2047 07/26/18  0743   HGB 12.4 14.3 12.4   WBC 9.9 11.7* 14.5*          Azalia Rivera PA-C     as above,  Pt non-communicative, not eating  Discussed with hospitlaist  Slight elevation of AST, ALT and direct Bili. This was discussed with GI by hospitlaist, they are considering EUS (cannot have MRCP due to pacemaker)  Faheem Marrero MD  1/5/2021 1:18 PM     "

## 2021-01-06 LAB
ALBUMIN SERPL-MCNC: 2.7 G/DL (ref 3.4–5)
ALP SERPL-CCNC: 87 U/L (ref 40–150)
ALT SERPL W P-5'-P-CCNC: 72 U/L (ref 0–50)
ANION GAP SERPL CALCULATED.3IONS-SCNC: 7 MMOL/L (ref 3–14)
AST SERPL W P-5'-P-CCNC: 84 U/L (ref 0–45)
BASOPHILS # BLD AUTO: 0 10E9/L (ref 0–0.2)
BASOPHILS NFR BLD AUTO: 0.4 %
BILIRUB DIRECT SERPL-MCNC: 0.2 MG/DL (ref 0–0.2)
BILIRUB SERPL-MCNC: 0.7 MG/DL (ref 0.2–1.3)
BUN SERPL-MCNC: 15 MG/DL (ref 7–30)
CALCIUM SERPL-MCNC: 8.3 MG/DL (ref 8.5–10.1)
CHLORIDE SERPL-SCNC: 109 MMOL/L (ref 94–109)
CO2 SERPL-SCNC: 23 MMOL/L (ref 20–32)
CREAT SERPL-MCNC: 0.71 MG/DL (ref 0.52–1.04)
DIFFERENTIAL METHOD BLD: ABNORMAL
EOSINOPHIL # BLD AUTO: 0 10E9/L (ref 0–0.7)
EOSINOPHIL NFR BLD AUTO: 0.1 %
ERYTHROCYTE [DISTWIDTH] IN BLOOD BY AUTOMATED COUNT: 14.1 % (ref 10–15)
GFR SERPL CREATININE-BSD FRML MDRD: 75 ML/MIN/{1.73_M2}
GLUCOSE SERPL-MCNC: 79 MG/DL (ref 70–99)
HCT VFR BLD AUTO: 37 % (ref 35–47)
HGB BLD-MCNC: 12.5 G/DL (ref 11.7–15.7)
IMM GRANULOCYTES # BLD: 0.1 10E9/L (ref 0–0.4)
IMM GRANULOCYTES NFR BLD: 0.8 %
LYMPHOCYTES # BLD AUTO: 1.5 10E9/L (ref 0.8–5.3)
LYMPHOCYTES NFR BLD AUTO: 17.3 %
MCH RBC QN AUTO: 33.8 PG (ref 26.5–33)
MCHC RBC AUTO-ENTMCNC: 33.8 G/DL (ref 31.5–36.5)
MCV RBC AUTO: 100 FL (ref 78–100)
MONOCYTES # BLD AUTO: 0.8 10E9/L (ref 0–1.3)
MONOCYTES NFR BLD AUTO: 9.7 %
NEUTROPHILS # BLD AUTO: 6 10E9/L (ref 1.6–8.3)
NEUTROPHILS NFR BLD AUTO: 71.7 %
NRBC # BLD AUTO: 0 10*3/UL
NRBC BLD AUTO-RTO: 0 /100
PLATELET # BLD AUTO: 157 10E9/L (ref 150–450)
POTASSIUM SERPL-SCNC: 3.7 MMOL/L (ref 3.4–5.3)
PROT SERPL-MCNC: 5.7 G/DL (ref 6.8–8.8)
RBC # BLD AUTO: 3.7 10E12/L (ref 3.8–5.2)
SODIUM SERPL-SCNC: 139 MMOL/L (ref 133–144)
WBC # BLD AUTO: 8.4 10E9/L (ref 4–11)

## 2021-01-06 PROCEDURE — 80048 BASIC METABOLIC PNL TOTAL CA: CPT | Performed by: HOSPITALIST

## 2021-01-06 PROCEDURE — 250N000011 HC RX IP 250 OP 636: Performed by: SURGERY

## 2021-01-06 PROCEDURE — 99231 SBSQ HOSP IP/OBS SF/LOW 25: CPT | Performed by: HOSPITALIST

## 2021-01-06 PROCEDURE — 85025 COMPLETE CBC W/AUTO DIFF WBC: CPT | Performed by: HOSPITALIST

## 2021-01-06 PROCEDURE — 80076 HEPATIC FUNCTION PANEL: CPT | Performed by: HOSPITALIST

## 2021-01-06 PROCEDURE — 120N000004 HC R&B MS OVERFLOW

## 2021-01-06 PROCEDURE — 250N000011 HC RX IP 250 OP 636: Performed by: HOSPITALIST

## 2021-01-06 PROCEDURE — 99207 PR CDG-CODE CATEGORY CHANGED: CPT | Performed by: HOSPITALIST

## 2021-01-06 PROCEDURE — 250N000013 HC RX MED GY IP 250 OP 250 PS 637: Performed by: HOSPITALIST

## 2021-01-06 PROCEDURE — 36415 COLL VENOUS BLD VENIPUNCTURE: CPT | Performed by: HOSPITALIST

## 2021-01-06 RX ADMIN — ENOXAPARIN SODIUM 40 MG: 40 INJECTION SUBCUTANEOUS at 11:34

## 2021-01-06 RX ADMIN — ACETAMINOPHEN 1000 MG: 500 TABLET, FILM COATED ORAL at 08:14

## 2021-01-06 RX ADMIN — METOPROLOL SUCCINATE 25 MG: 25 TABLET, FILM COATED, EXTENDED RELEASE ORAL at 08:15

## 2021-01-06 RX ADMIN — ONDANSETRON 4 MG: 4 TABLET, ORALLY DISINTEGRATING ORAL at 04:51

## 2021-01-06 RX ADMIN — ACETAMINOPHEN 1000 MG: 500 TABLET, FILM COATED ORAL at 14:01

## 2021-01-06 RX ADMIN — ASPIRIN 81 MG 81 MG: 81 TABLET ORAL at 08:15

## 2021-01-06 RX ADMIN — CEFTRIAXONE 1 G: 1 INJECTION, POWDER, FOR SOLUTION INTRAMUSCULAR; INTRAVENOUS at 11:34

## 2021-01-06 RX ADMIN — ACETAMINOPHEN 1000 MG: 500 TABLET, FILM COATED ORAL at 19:54

## 2021-01-06 NOTE — PROGRESS NOTES
"[facility]   General Surgery Progress Note          Assessment and Plan:   Assessment:   3 Days Post-Op s/p Procedure(s):  CHOLECYSTECTOMY, LAPAROSCOPIC  History of dementia      Plan:   -Low fat diet - awaiting diet tolerance, refusing to eat this am.  -Pain Mgmt: Tylenol  -Encourage ambulation 3-4 x daily  -ok to discharge from surgical perspective once tolerating diet.  -GI following  -will sign off, please call with questions.         Interval History:   Appears comfortable in bed.  [place].  Denies pain at rest, but states not eating due to stomach tenderness.  D/w RN- EUS was cancelled today and diet trial was recommended, however patient now refusing to eat.  + BM yesterday per chart.         Physical Exam:   Blood pressure (!) 146/68, pulse 69, temperature 97.2  F (36.2  C), temperature source Oral, resp. rate 18, height 1.651 m (5' 5\"), weight 82.6 kg (182 lb), SpO2 95 %.    I/O last 3 completed shifts:  In: 100 [P.O.:100]  Out: -     Abdomen: soft, +BS. Tender at epigastrium.  Inc(s) - clean, dry, intact with gllue, no erythema, +ecchymosis on lateral port sites            Data:     Recent Labs   Lab Test 01/03/21  0551 01/02/21  2047 07/26/18  0743   HGB 12.4 14.3 12.4   WBC 9.9 11.7* 14.5*          Anya Ghotra PA-C     Seen and agree. She is more alert and interactive than pre-op but still has no appetite. Placement per hospitalist. Available for any questions or concerns.     "

## 2021-01-06 NOTE — PLAN OF CARE
PRIMARY DIAGNOSIS: s/p lap darrell (POD2)  OUTPATIENT/OBSERVATION GOALS TO BE MET BEFORE DISCHARGE:  1. Stable vital signs Yes  2. Tolerating diet: No- refused to eat  3. Pain controlled with oral pain medications: LESTER- non given this shift  4. Positive bowel sounds:  Yes   5. Voiding without difficulty:  Yes   6. Able to ambulate:  Yes   7. Provider specific discharge goals met:  No     Discharge Planner Nurse   Safe discharge environment identified: Yes  Barriers to discharge: Yes       Entered by: Melody Edmondson 6:55 PM     Please review provider order for any additional goals.   Nurse to notify provider when observation goals have been met and patient is ready for discharge.    Pt resting comfortably. A&O to self. Denies pain. Transfers SBA. Incontinent of bowel and bladder. IVF infusing.

## 2021-01-06 NOTE — PROGRESS NOTES
Care Management Follow Up    Length of Stay (days): 1    Expected Discharge Date: 01/07/21     Concerns to be Addressed: discharge planning, care coordination/care conferences     Patient plan of care discussed at interdisciplinary rounds: Yes    Anticipated Discharge Disposition:  Return to senior living     Anticipated Discharge Services: None  Anticipated Discharge DME: None    Patient/family educated on Medicare website which has current facility and service quality ratings:  No  Education Provided on the Discharge Plan:  Nursing at senior living  Patient/Family in Agreement with the Plan:  Yes    Referrals Placed by CM/SW: External Care Coordination  Private pay costs discussed: Not applicable    Additional Information:  Spoke with nursing, Yadira, at [facility]. Updated [facility] on plan of care and discharge plans. Plan for discharge this afternoon if patient tolerates po intake. Yadira states that patient intermittently eats and often time has need for urgent BM after eating.   [facility] does not plan to come to hospital for an assessment. No concerns with patient returning to senior living if at baseline up independently with walker.   Orders to be faxed to [facility] at [phone].  Requesting home care RN.   Spoke with taye Jeffrey over the phone. Rachele is understanding and in agreement with home care. Referral will be sent to [facility] P: [phone] Fax [phone].   Taye eJffrey is concerned about patient returning to senior living if continues with nausea and vomiting. Assured Rachele that would assess and only discharge patient once medically stable.     Update 1300: Provider had discussion with taye Rachele and nurse Yadira at [facility]. After discussion, decision made to allow for patient to remain in hospital today for further encouragement and monitoring of oral intake. Taye Rachele to discuss increasing services vs beginning planning to memory care with staff at [facility].  Spoke with Yadira over the phone to  update on discharge plans. Yadira appreciative of plan. Requested that [facility] speak with daughter Rachele regarding increased care needs such as increased safety checks. Yadira reiterated that the service of encouraging intake or monitoring meals in [facility] is not possible at this time d/t staffing restrictions and current work flow with the pandemic. Yadira stated that have started discussion with family regarding need for memory care, but states family has not been interested.   Will update nursing at [facility] and daughter tomorrow.  Yadira stated that facility unable to accommodate specialized diet of low fiber/ fat. Facility is able to provide for no added salt.       Magalie Valiente, RN      Magalie Valiente RN Case Manager  Inpatient Care Coordination  [facility]   [phone]

## 2021-01-06 NOTE — PROVIDER NOTIFICATION
KOURTNEY Fernandes, notified that patient pulled out IV for 4th-5th time this hospital stay. OK for no IV access per Inge and will re-evaluate need tomorrow before next antibiotic dose.

## 2021-01-06 NOTE — PLAN OF CARE
"/63 (BP Location: Left arm)   Pulse 64   Temp 98.3  F (36.8  C) (Oral)   Resp 16   Ht 1.651 m (5' 5\")   Wt 82.6 kg (182 lb)   SpO2 97%   BMI 30.29 kg/m    Neuro: Dementia- alert to self only.   Cardiac: Pacemaker- HR 60's  Lungs: Clear anterior  GI: Rounded-lap sites x4, liquid bandage-some erythema and edema noted around sites. Ice applied. [place] Tylenol for pain. Had BM. Need encouragement to eat-see flowsheet for intake  : Incontinent  Pain: appears to be tolerable with scheduled tylenopl and ice  IV: SL-new IV placed this am  Meds: IV Rocephin- last dose today for UTI  Diet:Low fat- needs encouragement  Activity: SBA  Plan: discharge back to [facility] later today.       "

## 2021-01-06 NOTE — DISCHARGE SUMMARY
[facility]  Hospitalist Discharge Summary      Date of Admission:  1/2/2021  Date of Discharge:  1/6/2021  Discharging Provider: John Barnes MD      Discharge Diagnoses   Nausea. Cholelithiasis. Acute cholecystitis. S/p laparoscopic cholecystectomy    Follow-ups Needed After Discharge   Follow-up Appointments     Follow-up and recommended labs and tests       Follow up with primary care provider, Physician No Ref-Primary, within 7   days for hospital follow- up.  The following labs/tests are recommended:   LFTs (liver function tests in 2-3 days).             Unresulted Labs Ordered in the Past 30 Days of this Admission     No orders found from 12/3/2020 to 1/3/2021.      These results will be followed up by NA    Discharge Disposition   Discharged to assisted living  Condition at discharge: Stable    Hospital Course   Leighann Regan is a 89 year old female with a history of dementia who presented to the ED for evaluation of fatigue, nausea and vomiting.  History was very limited.  Patient is hard of hearing and a poor provider history.  I called her daughter but was not able to reach her.  Apparently, the patient comes from a facility.  She was noted to have nausea, fatigue and vomiting.  Apparently she was refusing her medicines.  There is mention that she was recent diagnosed with UTI and started on Macrobid.  She was able to answer 1 question and said that she had some chest pain.  No further history could be obtained.  Patient was admitted to the hospital. She was started on ceftriaxone for possible urinary tract infection. On the day after admission and exam was done she had right upper quadrant tenderness. An ultrasound was obtained. She had cholelithiasis with a stone in the neck of the gallbladder. Surgery was consulted. Patient went to the OR for laparoscopic cholecystectomy. She was also found to an have an impacted stone in the neck of the gallbladder that was infected. It  was found that no urine culture was sent from the emergency room. She completed a 5-day course of ceftriaxone. Initially after surgery patient was still hesitant to eat. LFTs were obtained and were elevated. Initially GI was consulted and a plan was made to perform an ERCP, because an MRCP could not be done because of the pacemaker. However, today her LFTs are returning to normal. Likely they were elevated due to cauterization during surgery. She is able to eat this morning with encouragement. I believe that her dementia is causing her to be hesitant to eat. She likely has some soreness around her incision sites but does not understand what she has had done here. If she is encouraged to eat she is tolerating liquids and food. I have kept her daughter updated. She will return to her assisted living today. Daughter will try to increase services at her assisted living.    Consultations This Hospital Stay   SOCIAL WORK IP CONSULT  PHYSICAL THERAPY ADULT IP CONSULT  OCCUPATIONAL THERAPY ADULT IP CONSULT  SURGERY GENERAL IP CONSULT    Code Status   No CPR- Do NOT Intubate    Time Spent on this Encounter   I, John Barnes MD, personally saw the patient today and spent greater than 30 minutes discharging this patient.       John Barnes MD  [facility] OBSERVATION DEPT  [address] [place] [address]  Phone: [phone]  ______________________________________________________________________    Physical Exam   Vital Signs: Temp: 98.3  F (36.8  C) Temp src: Oral BP: 131/63 Pulse: 64   Resp: 16 SpO2: 97 % O2 Device: None (Room air)    Weight: 182 lbs 0 oz  Constitutional: awake, alert, cooperative, no apparent distress, and appears stated age  Eyes: Lids and lashes normal, pupils equal, round and reactive to light, extra ocular muscles intact, sclera clear, conjunctiva normal  ENT: Normocephalic, without obvious abnormality, atraumatic, sinuses nontender on palpation, external ears without  lesions, oral pharynx with moist mucous membranes, tonsils without erythema or exudates, gums normal and good dentition.  Respiratory: No increased work of breathing, good air exchange, clear to auscultation bilaterally, no crackles or wheezing  Cardiovascular: Normal apical impulse, regular rate and rhythm, normal S1 and S2, no S3 or S4, and no murmur noted  GI: soft +BS. Incision sites clean and dry, Does not appear to have tenderness  Skin: no bruising or bleeding       Primary Care Physician   Physician No Ref-Primary    Discharge Orders      Home care nursing referral      Reason for your hospital stay    Nausea, decreased PO intake. Found to have cholelethiasis and cholecystitis     Follow-up and recommended labs and tests     Follow up with primary care provider, Physician No Ref-Primary, within 7 days for hospital follow- up.  The following labs/tests are recommended: LFTs (liver function tests in 2-3 days).     MD face to face encounter    Documentation of Face to Face and Certification for Home Health Services    I certify that patient: Leighann Regan is under my care and that I, or a nurse practitioner or physician's assistant working with me, had a face-to-face encounter that meets the physician face-to-face encounter requirements with this patient on: 1/6/2021.    This encounter with the patient was in whole, or in part, for the following medical condition, which is the primary reason for home health care: advanced dementia, cholecystectomy.    I certify that, based on my findings, the following services are medically necessary home health services: Nursing.    My clinical findings support the need for the above services because: Nurse is needed: To assess symptoms, PO intake, surgical sites after changes in medications or other medical regimen..    Further, I certify that my clinical findings support that this patient is homebound (i.e. absences from home require considerable and taxing effort and  are for medical reasons or Christianity services or infrequently or of short duration when for other reasons) because: Requires assistance of another person or specialized equipment to access medical services because patient: Requires supervision of another for safe transfer...    Based on the above findings. I certify that this patient is confined to the home and needs intermittent skilled nursing care, physical therapy and/or speech therapy.  The patient is under my care, and I have initiated the establishment of the plan of care.  This patient will be followed by a physician who will periodically review the plan of care.  Physician/Provider to provide follow up care: No Ref-Primary, Physician    Attending hospital physician (the Medicare certified [place] provider): John Barnes MD  Physician Signature: See electronic signature associated with these discharge orders.  Date: 1/6/2021     Activity    Your activity upon discharge: activity as tolerated     Diet    Follow this diet upon discharge: Orders Placed This Encounter      Snacks/Supplements Adult: Boost Shake; Between Meals  Low fiber, low fat diet  Patient needs to be encouraged to eat and drink.       Significant Results and Procedures   Most Recent 3 CBC's:  Recent Labs   Lab Test 01/06/21  0608 01/03/21  0551 01/02/21  2047   WBC 8.4 9.9 11.7*   HGB 12.5 12.4 14.3    102* 103*    175 213     Most Recent 3 BMP's:  Recent Labs   Lab Test 01/06/21  0608 01/05/21  0553 01/04/21  0538    139 139   POTASSIUM 3.7 3.6 3.6   CHLORIDE 109 110* 110*   CO2 23 25 25   BUN 15 17 17   CR 0.71 0.71 0.74   ANIONGAP 7 4 4   ALESHA 8.3* 7.9* 7.8*   GLC 79 93 153*     Most Recent 2 LFT's:  Recent Labs   Lab Test 01/06/21  0608 01/05/21  0553   AST 84* 165*   ALT 72* 110*   ALKPHOS 87 80   BILITOTAL 0.7 0.9   ,   Results for orders placed or performed during the hospital encounter of 01/02/21   US Abdomen Limited    Narrative    US ABDOMEN LIMITED 1/3/2021  10:47 AM    CLINICAL HISTORY: Right upper quadrant pain with nausea.    TECHNIQUE: Limited abdominal ultrasound.    COMPARISON: None.    FINDINGS:    GALLBLADDER: Negative sonographic Whitehead's sign. Multiple gallstones  within the gallbladder lumen. The gallbladder neck stone is also  identified. No gallbladder wall thickening is demonstrated.    BILE DUCTS: There is no biliary dilatation. The common duct measures  7mm.    LIVER: Normal where seen.    RIGHT KIDNEY: No hydronephrosis.    PANCREAS: The visualized portions of the pancreas are normal.    No ascites.      Impression    IMPRESSION:  1.  Cholelithiasis. A gallbladder neck stone is noted. Negative  sonographic Whitehead's sign. The common duct is 7 mm.  2.  No other acute abnormality.    DENYS INFANTE MD       Discharge Medications   Current Discharge Medication List      CONTINUE these medications which have NOT CHANGED    Details   !! acetaminophen (TYLENOL) 500 MG tablet Take 1,000 mg by mouth 3 times daily      aspirin 81 MG chewable tablet Take 81 mg by mouth daily      bacitracin 500 UNIT/GM OINT Apply topically 2 times daily To left ear abrasion until healed      Vale Protect (EUCERIN) external cream Apply topically 2 times daily as needed for dry skin or other (reddened skin with toileting until healed)      Calcium Carb-Cholecalciferol 600-800 MG-UNIT TABS Take 1 tablet by mouth daily      famotidine (PEPCID) 20 MG tablet Take 20 mg by mouth At Bedtime      furosemide (LASIX) 20 MG tablet Take 20 mg by mouth daily      metoprolol succinate (TOPROL-XL) 50 MG 24 hr tablet Take 50 mg by mouth daily      Multiple Vitamins-Minerals (PRESERVISION AREDS 2) CAPS Take 1 capsule by mouth daily      nystatin (MYCOSTATIN) 078237 UNIT/GM external powder Apply topically 2 times daily as needed Apply to affected areas      potassium chloride ER (KLOR-CON M) 20 MEQ CR tablet Take 20 mEq by mouth daily      !! acetaminophen (TYLENOL) 325 MG tablet Take 650 mg by mouth  every 8 hours as needed for mild pain      acetaminophen (TYLENOL) 650 MG suppository Place 650 mg rectally every 4 hours as needed for fever      bisacodyl (DULCOLAX) 10 MG suppository Place 10 mg rectally daily as needed for constipation      guaiFENesin (ROBITUSSIN) 100 MG/5ML SYRP Take 10 mLs by mouth every 4 hours as needed for cough      loperamide (IMODIUM A-D) 2 MG tablet Take 2 mg by mouth 4 times daily as needed for diarrhea Give 4mg after 1st loose stool then 2mg after each subsequent loose stool. Max 16mg/day.      magnesium hydroxide (MILK OF MAGNESIA) 400 MG/5ML suspension Take 30 mLs by mouth daily as needed for constipation or heartburn      meclizine (ANTIVERT) 25 MG tablet Take 25 mg by mouth 3 times daily as needed for dizziness      Neomycin-Bacitracin-Polymyxin (TRIPLE ANTIBIOTIC) 3.5-400-5000 OINT ointment Externally apply topically 3 times daily as needed      Ostomy Supplies (SKIN PREP WIPES) MISC as needed (blisters)      senna-docusate (SENOKOT-S;PERICOLACE) 8.6-50 MG per tablet Take 1 tablet by mouth daily as needed for constipation       !! - Potential duplicate medications found. Please discuss with provider.      STOP taking these medications       nitroFURantoin macrocrystal-monohydrate (MACROBID) 100 MG capsule Comments:   Reason for Stopping:             Allergies   Allergies   Allergen Reactions     Codeine      Ibuprofen      Latex      Sulfa Drugs      Tramadol

## 2021-01-06 NOTE — PROGRESS NOTES
Progress Note      Patient was discharged back to her assisted living. Yadira, nurse from [facility], is refusing to take the patient based on their inability to encourage the patient to eat. I have spoken with her daughter. She was unaware that the facility was not able to provide these types of increased services. Patient is drinking water, ginger ale here and has had toast. She also has Boost that she is drinking. I believe it is her advanced dementia that precludes her from eating without encouragement. This may be temporary due to her surgery and not being able to remember/comprehend that she just had surgery. She has advanced dementia and likely should have been living in memory care. She has no rehab needs, so TCU is not an appropriate discharge plan at this time. Daughter will work with [facility]. She should discharge to her assisted living tomorrow.    John Barnes MD

## 2021-01-06 NOTE — UTILIZATION REVIEW
"  Admission Status; Secondary Review Determination         Under the authority of the Utilization Management Committee, the utilization review process indicated a secondary review on the above patient.  The review outcome is based on review of the medical records, discussions with staff, and applying clinical experience noted on the date of the review.        (X)      Inpatient Status Appropriate - This patient's medical care is consistent with medical management for inpatient care and reasonable inpatient medical practice.      () Observation Status Appropriate - This patient does not meet hospital inpatient criteria and is placed in observation status. If this patient's primary payer is Medicare and was admitted as an inpatient, Condition Code 44 should be used and patient status changed to \"observation\".   () Admission Status NOT Appropriate - This patient's medical care is not consistent with medical management for Inpatient or Observation Status.          RATIONALE FOR DETERMINATION     88 yo with history advanced dementia, hard of hearing, diastolic dysfunction, bradycardia with pacemaker, parox a fib, HTN admitted on 1/2/21 with nausea and vomiting.  She was initially felt to have a UTI, however ultrasound revealed cholelithiasis with a stone embedded in the gallbladder neck.  Patient underwent cholecystectomy.  She has had a slow recovery and a bump in her liver enzymes.  She has been followed by GI and general surgery.  Continues to have a poor appetite, and currently refusing to eat.  She is receiving IV fluids.  She is appropriate for Inpatient status    The severity of illness, intensity of service provided, expected LOS and risk for adverse outcome make the care complex, high risk and appropriate for hospital admission.        The information on this document is developed by the utilization review team in order for the business office to ensure compliance.  This only denotes the appropriateness of " proper admission status and does not reflect the quality of care rendered.         The definitions of Inpatient Status and Observation Status used in making the determination above are those provided in the CMS Coverage Manual, Chapter 1 and Chapter 6, section 70.4.      Sincerely,     Faheem Fish MD  Physician Advisor  Utilization Review/ Case Management  [facility].

## 2021-01-06 NOTE — PLAN OF CARE
"[id number]    Inpatient Progress Note:    /56 (BP Location: Left arm)   Pulse 65   Temp 96.5  F (35.8  C) (Oral)   Resp 18   Ht 1.651 m (5' 5\")   Wt 82.6 kg (182 lb)   SpO2 98%   BMI 30.29 kg/m       Orientation: disoriented to place, time, and situation  Pain status: does not answer - 1/10 on PAINAD scale  Activity: up SBA with walker and gait belt  Peripheral edema: bilateral leg edema  Resp: WDL  Cardiac: WDL - permanent pacemaker  GI: abdominal discomfort, intermittent nausea - SL zofran given  : incontinence  Skin: blanchable redness to bottom, generalized bruising, abd lap sites with liquid bandage  LDA: no IV access (pulled IV out), Inge OCONNELL aware  Infusions: Q24H rocephin   Pertinent Labs: COVID (-), ALT 72, AST 84  Diet: NPO since midnight  Consults: GI  Discharge Plan: EUS @ 1500 then determine plan of care - nausea and pain control    Will continue to monitor and provide cares.     Taisha Garrett RN         "

## 2021-01-07 PROCEDURE — 250N000011 HC RX IP 250 OP 636: Performed by: SURGERY

## 2021-01-07 PROCEDURE — 99207 PR CDG-MDM COMPONENT: MEETS LOW - DOWN CODED: CPT | Performed by: INTERNAL MEDICINE

## 2021-01-07 PROCEDURE — 99232 SBSQ HOSP IP/OBS MODERATE 35: CPT | Performed by: INTERNAL MEDICINE

## 2021-01-07 PROCEDURE — 120N000004 HC R&B MS OVERFLOW

## 2021-01-07 RX ADMIN — ONDANSETRON 4 MG: 4 TABLET, ORALLY DISINTEGRATING ORAL at 12:22

## 2021-01-07 RX ADMIN — ONDANSETRON 4 MG: 4 TABLET, ORALLY DISINTEGRATING ORAL at 20:52

## 2021-01-07 NOTE — PROGRESS NOTES
[facility]  Hospitalist Progress Note  Frances Sahu MD 01/07/21  Text Page  Pager: [phone] (7am-6pm)    Reason for Stay (Diagnosis): N/V, cholelithiasis and cholecystitis         Assessment and Plan:      Summary of Stay: Leighann Regan is a 89 year old female with past medical history of advanced dementia, [place], bradycardia s/p pacemaker, PAF, HTN who resides at [facility] who was admitted on 1/2/2021 with nausea and vomiting and was found to have cholelithiasis and cholecystitis.  Patient underwent cholecystectomy on 1/3.  She has not been eating despite significant encouragement from nursing staff.  I met with the patient's two daughters today to discuss discharge planning.  Expressed my concerns for her returning to [facility] given her extremely poor appetite which I think at this point is related to her advanced dementia.  Discussed possibility of hospice and family agreeable to this.  SW will assist with discharge planning.    Patient transitioned to comfort cares.    Problem List/Assessment and Plan:     Nausea and vomiting due to cholelithiasis and cholecystitis s/p cholecystectomy    - initially felt to be due to possible UTI (see below)    - Found to have RUQ pain, US was done, cholelithiasis seen with stone in gallbladder neck    - On 1/3 underwent cholecystectomy (found to have impacted/infected stone in gallbladder neck)    - PRN anti-emetics, pain control: advance diet as able    - patient has continued to refuse PO due to nausea and likely some pain (difficult to tell due to her baseline).  On exam today she had no abdominal pain (did have pain in her LEs on my exam so I do feel that she does not have abdominal pain).  At this point I think that her dementia is the biggest issue which is keeping her from eating, discussed this extensively with her family    - LFTs (ALT/AST only) overall improving after cholecystectomy    - Transitioning to comfort  cares today    Comfort Cares: Spoke extensively with patient's two daughters who are her POA.  Explained that I am concerned that her poor PO intake at this point is due to her advanced dementia.  I am not optimistic that she will do well with return to [facility] as I do not think that she will eat.  Discussed discharge options including a higher level of care vs hospice.  Ultimately family elected to discharge on hospice.    - SW consulted, now transitioned to comfort cares, will need hospice upon discharge    Possible UTI    - UA appeared dirty in ED, no culture was sent    - started on ceftriaxone, continued 5 day course, therapy now completed     Advanced dementia    - at her baseline, family reports that she has declined rather significantly over the past few months    - lived at [facility]     Pacemaker    - was placed in [place]    - appears to be due to bradycardia     Parox a fib    - has been seen by cards    - no anticoagulation due to fall risk    - cont Toprol but lower dose due to bradycardia     HTN    - cont PTA Toprol    - has been paul, so will decrease Toprol to 25mg    Diet: Regular Diet Adult  Snacks/Supplements Adult: Boost Shake; Between Meals  Diet    DVT Prophylaxis: Stop anticoagulation due to transition to comfort cares  Mccurdy Catheter: not present  Code Status: No CPR- Do NOT Intubate      Disposition Plan   Expected discharge: Once hospice can be arranged, recommended to Discharge on hospice once safe disposition plan/ TCU bed available.  Entered: Frances Sahu MD 01/07/2021, 11:34 AM       The patient's care was discussed with the Bedside Nurse, Care Coordinator/, Patient and Patient's Family.    Hospitalist Service  [facility]          Interval History (Subjective):      I saw the patient this morning.  She was sleeping.  Briefly would make a noise but not really interact.  Once opened her eyes slightly.  Did not wince or appear  "uncomfortable when I examined her abdomen but yelled out when I touched her legs.    I met with the patient's daughter at the bedside.  Reviewed her course and expressed my concerns for how she is currently doing and discharge back to [facility].  I discussed hospice.  She then talked with her sister and I met again with her and her sister (via iPAD) and reviewed all of this again.  Ultimately they decided that hospice is what they would like to pursue.  All questions answered.                  Physical Exam:      Last Vital Signs:  BP (!) 160/80 (BP Location: Left arm)   Pulse 75   Temp 97.7  F (36.5  C) (Axillary)   Resp 16   Ht 1.651 m (5' 5\")   Wt 82.6 kg (182 lb)   SpO2 97%   BMI 30.29 kg/m      General: Sleeping, briefly opened eyes, moaned a few times, not interactive  HEENT: Normocephalic and atraumatic, MMM.  Cardiac: RRR, normal S1, S2. No m/g/r, 1+ ffxffde4an LE edema.  Pulmonary: Normal chest rise, normal work of breathing.  Lungs CTAB without crackles or wheezing.  Abdomen: soft, non-tender, non-distended.  Normoactive bowel sounds, no guarding or rebound tenderness.  Extremities: no deformities.  Warm, well perfused.  Skin: no rashes or lesions.  Warm and Dry.  Neuro: Sleeping, yelled out when I touched her ankles, briefly opened eyes, not answering questions  Psych: Sleeping. Quite sedated today.  Oriented only to self but not situation/place         Medications:      All current medications were reviewed with changes reflected in problem list.         Data:      All new lab and imaging data was reviewed.   Labs:  Recent Labs   Lab 01/06/21  0608 01/03/21  0551 01/02/21 2047   WBC 8.4 9.9 11.7*   HGB 12.5 12.4 14.3   HCT 37.0 37.9 43.8    102* 103*    175 213     Recent Labs   Lab 01/06/21  0608 01/05/21  0553 01/04/21  0538 01/02/21 2047 01/02/21 2047    139 139   < > 141   POTASSIUM 3.7 3.6 3.6   < > 3.8   CHLORIDE 109 110* 110*   < > 106   CO2 23 25 25   < > 24 "   ANIONGAP 7 4 4   < > 11   GLC 79 93 153*   < > 102*   BUN 15 17 17   < > 22   CR 0.71 0.71 0.74   < > 0.86   GFRESTIMATED 75 75 71   < > 59*   GFRESTBLACK 87 87 83   < > 69   ALESHA 8.3* 7.9* 7.8*   < > 9.1   PROTTOTAL 5.7* 5.4*  --   --  6.8   ALBUMIN 2.7* 2.5*  --   --  3.1*   BILITOTAL 0.7 0.9  --   --  0.9   ALKPHOS 87 80  --   --  92   AST 84* 165*  --   --  33   ALT 72* 110*  --   --  26    < > = values in this interval not displayed.      Imaging:   Results for orders placed or performed during the hospital encounter of 01/02/21   US Abdomen Limited    Narrative    US ABDOMEN LIMITED 1/3/2021 10:47 AM    CLINICAL HISTORY: Right upper quadrant pain with nausea.    TECHNIQUE: Limited abdominal ultrasound.    COMPARISON: None.    FINDINGS:    GALLBLADDER: Negative sonographic Whitehead's sign. Multiple gallstones  within the gallbladder lumen. The gallbladder neck stone is also  identified. No gallbladder wall thickening is demonstrated.    BILE DUCTS: There is no biliary dilatation. The common duct measures  7mm.    LIVER: Normal where seen.    RIGHT KIDNEY: No hydronephrosis.    PANCREAS: The visualized portions of the pancreas are normal.    No ascites.      Impression    IMPRESSION:  1.  Cholelithiasis. A gallbladder neck stone is noted. Negative  sonographic Whitehead's sign. The common duct is 7 mm.  2.  No other acute abnormality.    DENYS INFANTE MD     No results found for this or any previous visit (from the past 24 hour(s)).    Frances Sahu MD     I spent 45 minutes with the patient and her family reviewing care plan and discussing discharge planning.

## 2021-01-07 NOTE — PLAN OF CARE
"  Vitals: /52 (BP Location: Left arm)   Pulse 69   Temp 97.2  F (36.2  C) (Oral)   Resp 16   Ht 1.651 m (5' 5\")   Wt 82.6 kg (182 lb)   SpO2 97%   BMI 30.29 kg/m    BMI= Body mass index is 30.29 kg/m .      Neuro: Disorientedx3.  Alert to self only.  Pt has advanced dementia. - Bed alarm activated.  Cardiac: Pacemaker in place, HR 69  Lungs: WNL  GI: Incontinent, Abd round & soft, Pt has 4 incisional site from Lap Edilia, sites are intact, bruised, & without drainage  : Incontinent  Pain: Pt reported pain of lower abd - Scheduled tylenol given, & ice pack applied  IV: SL  Meds: Rocephin completed today, held senna this evening due to multiple BMs this shift  Diet: Reg + snacks/supplements; Pt ate few bites of apple sauce around 1600 & ate 25% of meal this evening.  Activity: Ax1 w/ gait belt & walker  Plan: Pt [place] - pocket talker in , Encourage PO intake, Pt to discharge back to [facility] tmr; Will cont to monitor & provide cares.       "

## 2021-01-07 NOTE — PLAN OF CARE
"Patient sleeping throughout entire shift. Wont wake up to take meds, eat, or receive cares despite several staff members' best efforts.  New plan for patient to enroll in hospice and was placed on comfort cares per discussion between provider and patient's daughter-  per discussion, hospice appropriate d/t patient's lack of eating in the setting of her advanced dementia.  Daughters have been approved per provider to visit with new orders for comfort cares. Daughters at bedside. SW following for hospice enrollment and safe discharge planning. BP (!) 160/80 (BP Location: Left arm)   Pulse 75   Temp 97.7  F (36.5  C) (Axillary)   Resp 16   Ht 1.651 m (5' 5\")   Wt 82.6 kg (182 lb)   SpO2 97%   BMI 30.29 kg/m      "

## 2021-01-07 NOTE — CONSULTS
Care Management Follow Up    Length of Stay (days): 2.    Expected Discharge Date: 01/07/21.     Concerns to be Addressed: discharge planning, Hospice.   Patient plan of care discussed at interdisciplinary rounds: Yes    Anticipated Discharge Disposition: [facility], Hospice.     Anticipated Discharge Services: None  Anticipated Discharge DME: None    Patient/Family in Agreement with the Plan: yes.    Referrals Placed by CM/SW: External Care Coordination, Hospice  Private pay costs discussed: LTC vs. Residential hospice vs. Return to [facility] with increased services, hospice enrollment.     Additional Information:  Pt is now comfort cares, hospice. SW met with pt and 2 daughters at bedside. Discussed discharge planning with hospice enrollment. They had questions re: what increased services would look like if pt returned to [facility]. Spoke with [facility], director of health services, she will discuss further with daughters. [facility] is planning to visit tomorrow and complete assessment on patient.     MYLES completed [facility] Application. Faxed clinical information and application, awaiting determination of bed availability.     Plan: TBD. [facility] vs. [facility] with Hospice vs. LTC with hospice.     MYLES will continue to follow.     KAYE Martinez

## 2021-01-07 NOTE — PROGRESS NOTES
Care Management Discharge Note    Discharge Date: 01/07/21       Discharge Disposition: Home Care, Assisted Living    Discharge Services: None    Discharge DME: None    Discharge Transportation: family or friend will provide    Private pay costs discussed: Not applicable    Patient/family educated on Medicare website which has current facility and service quality ratings: no    Education Provided on the Discharge Plan:  Yes  Persons Notified of Discharge Plans: Taye Jeffrey, nurse May at [facility]  Patient/Family in Agreement with the Plan: yes    Handoff Referral Completed: Yes    Additional Information:  Patient ready for discharge today. Spoke with taye Jeffrey over the phone. Taye Jeffrey agreeable to discharge and plans to pick patient up at noon today.   Spoke with intake at [facility] [phone] to update information. Orders faxed to [facility] at [phone].  Spoke with nurse May at [facility]. Updated on  Plan of care and discharge today. Orders faxed to [phone]. No concerns noted at this time.     Update 1240: Updated Yadira nurse at [facility], informing of delay in discharge. [facility] is unable to take back on the weekends d/t no nursing available.     Update 1320: Informed Yadira [phone] of new plan of care: comfort care with plan for discharge on Hospice.   Nursing at [facility] requesting to complete assessment of patient on site. Will plan to assess on hospital Friday 1/8 at 1100. Management has previously approved.   Nursing suggesting [facility] as patient follows with [facility].   Equipment will need to be delivered (Broda Chair, BSC, Lift w/ Sling, and Hospital Bed) and possibly other furniture removed from the apartment prior to patient returning.   Family will need to discuss and agree to increased services at [facility].     Update 1500: Cancelled home care referral with [facility].    SW is following for discharge with Hospice.       Magalie Valiente, RN      Magalie Valiente RN Case Manager  Inpatient Care Coordination  [facility]   [phone]

## 2021-01-07 NOTE — PLAN OF CARE
"[id number]    Inpatient Progress Note:    BP (!) 167/63 (BP Location: Left arm)   Pulse 60   Temp 96.1  F (35.6  C) (Oral)   Resp 18   Ht 1.651 m (5' 5\")   Wt 82.6 kg (182 lb)   SpO2 99%   BMI 30.29 kg/m         Orientation: alert, disoriented to place, time, and situation  Pain status: denied overnight, 0/10 per PAINAD scale  Activity: up SBA with walker and gait belt  Peripheral edema: bilateral lower extremity edema  Resp: WDL  Cardiac: WDL  GI: abdominal discomfort and distension, lap sites with liquid bandage edematous and ecchymotic   : incontinent at times  Skin: lap sites with liquid bandage edematous and ecchymotic, blanchable redness to bottom -- barrier cream applied  LDA: peripheral IV  Infusions: SL  Pertinent Labs: COVID (-)  Diet: regular  Consults: PT, GI  Discharge Plan: encourage oral intake, discharge back to [facility] today    Will continue to monitor and provide cares.     Taisha Garrett RN         "

## 2021-01-08 VITALS
SYSTOLIC BLOOD PRESSURE: 131 MMHG | BODY MASS INDEX: 30.32 KG/M2 | WEIGHT: 182 LBS | RESPIRATION RATE: 18 BRPM | OXYGEN SATURATION: 92 % | TEMPERATURE: 95.4 F | HEIGHT: 65 IN | DIASTOLIC BLOOD PRESSURE: 62 MMHG | HEART RATE: 66 BPM

## 2021-01-08 LAB
LABORATORY COMMENT REPORT: NORMAL
SARS-COV-2 RNA RESP QL NAA+PROBE: NEGATIVE
SPECIMEN SOURCE: NORMAL

## 2021-01-08 PROCEDURE — 99207 PR APP CREDIT; MD BILLING SHARED VISIT: CPT | Performed by: INTERNAL MEDICINE

## 2021-01-08 PROCEDURE — 99232 SBSQ HOSP IP/OBS MODERATE 35: CPT | Performed by: HOSPITALIST

## 2021-01-08 PROCEDURE — 250N000011 HC RX IP 250 OP 636: Performed by: SURGERY

## 2021-01-08 PROCEDURE — 99207 PR CDG-CODE CATEGORY CHANGED: CPT | Performed by: HOSPITALIST

## 2021-01-08 PROCEDURE — 87635 SARS-COV-2 COVID-19 AMP PRB: CPT | Performed by: INTERNAL MEDICINE

## 2021-01-08 PROCEDURE — 250N000013 HC RX MED GY IP 250 OP 250 PS 637: Performed by: SURGERY

## 2021-01-08 PROCEDURE — 120N000004 HC R&B MS OVERFLOW

## 2021-01-08 RX ORDER — ONDANSETRON 4 MG/1
4 TABLET, ORALLY DISINTEGRATING ORAL EVERY 6 HOURS PRN
Qty: 15 TABLET | Refills: 0 | Status: SHIPPED | OUTPATIENT
Start: 2021-01-08

## 2021-01-08 RX ORDER — HYDROMORPHONE HYDROCHLORIDE 1 MG/ML
1 SOLUTION ORAL
Qty: 30 ML | Refills: 0 | Status: SHIPPED | OUTPATIENT
Start: 2021-01-08

## 2021-01-08 RX ORDER — ATROPINE SULFATE 10 MG/ML
1-2 SOLUTION/ DROPS OPHTHALMIC 4 TIMES DAILY PRN
Qty: 1 BOTTLE | Refills: 0 | Status: SHIPPED | OUTPATIENT
Start: 2021-01-08

## 2021-01-08 RX ORDER — LORAZEPAM 2 MG/ML
1 CONCENTRATE ORAL EVERY 4 HOURS PRN
Qty: 10 ML | Refills: 0 | Status: SHIPPED | OUTPATIENT
Start: 2021-01-08

## 2021-01-08 RX ORDER — ACETAMINOPHEN 325 MG/1
650 TABLET ORAL EVERY 8 HOURS PRN
Qty: 30 TABLET | Refills: 0 | Status: SHIPPED | OUTPATIENT
Start: 2021-01-08

## 2021-01-08 RX ORDER — OLANZAPINE 5 MG/1
5 TABLET, ORALLY DISINTEGRATING ORAL EVERY 6 HOURS PRN
Qty: 12 TABLET | Refills: 0 | Status: SHIPPED | OUTPATIENT
Start: 2021-01-08

## 2021-01-08 RX ORDER — BISACODYL 10 MG
10 SUPPOSITORY, RECTAL RECTAL DAILY PRN
Qty: 5 SUPPOSITORY | Refills: 0 | Status: SHIPPED | OUTPATIENT
Start: 2021-01-08

## 2021-01-08 RX ORDER — ACETAMINOPHEN 650 MG/1
650 SUPPOSITORY RECTAL EVERY 4 HOURS PRN
Qty: 7 SUPPOSITORY | Refills: 0 | Status: SHIPPED | OUTPATIENT
Start: 2021-01-08

## 2021-01-08 RX ADMIN — ONDANSETRON 4 MG: 4 TABLET, ORALLY DISINTEGRATING ORAL at 13:36

## 2021-01-08 RX ADMIN — PROCHLORPERAZINE MALEATE 5 MG: 5 TABLET ORAL at 17:55

## 2021-01-08 NOTE — PLAN OF CARE
Pt. Sleeping almost entire shift. Woke up briefly and was dry heaving a bit- PRN zofran given to dissolve in pt's cheek. No intake this shift. Daughters at bedside. Oral cares completed. Turn and repo. Incontinent care x2 assist. Plan for [facility] to transport to [facility] tomorrow morning at 0900.

## 2021-01-08 NOTE — PLAN OF CARE
Vitals stable. Patient slept and only aroused to painful stimuli. Was unable to give patient any food/water due to lethargy. Repositioned throughout the shift. Incontinent of urine on one occasion. Will continue supportive comfort cares.

## 2021-01-08 NOTE — PROGRESS NOTES
Care Management Follow Up    Length of Stay (days): 3.    Expected Discharge Date: 01/09/21.     Concerns to be Addressed: discharge planning, care coordination/care conferences.    Patient plan of care discussed at interdisciplinary rounds: Yes.    Anticipated Discharge Disposition: Hospice.     Anticipated Discharge Services: Hospice.  Anticipated Discharge DME: None.    Patient/family educated on Medicare website which has current facility and service quality ratings: Yes.  Education Provided on the Discharge Plan:  Yes.  Patient/Family in Agreement with the Plan: Yes.    Referrals Placed by CM/SW: Hospice.  Private pay costs discussed: MYLES discussed cost of LTC placement vs. Cost increase for increasing services at [facility] (family discussing with [facility] RN Director Yadira).     Additional Information:  MYLES spoke with Fela in admissions at [facility], [phone]. They have clinically accepted pt, and could admit pt tomorrow. If family is agreeable to [facility] placement, pt would need to arrive by 10am on Sunday. Discharge orders and med scripts to be faxed, facility will fill meds. Family is allowed to visit from 12-4pm, 2 visitors at a time. Near end of life, more visitors allowed per RN discretion. Updated daughter Rachele via phone.    SW, patient, two daughters, and Yadira from [facility] to meet in patients room at 11am to further discuss discharge plans for pt.     1200: Family has decided to pursue placement with [facility]. VM left with admissions to accept bed for tomorrow, [phone]. Admissions will call family to discuss intake process, etc.     Reviewed out of pocket cost for [facility] stretcher transport, $1042.75 for base rate and $25 per mile to the destination. Pt/family expressed understanding and are agreeable to this.      Patient requires stretcher transportation due to dementia, hospice, lethargy/unresponsiveness.    Stretcher transportation has been  arranged for 9:00am 1/9. Patient and bedside nurse notified of transportation time.    Ambulance PCS form completed and placed in patient chart. Ambulance PCS form should be given to transportation team.    Discharge orders to be faxed to [facility], (F) [phone]. Facility will fill meds, no need to fill meds here. SW will continue to follow.     KAYE Martinez

## 2021-01-08 NOTE — PLAN OF CARE
"Pt slept almost all evening shift. She has woken up briefly with touch and verbally saying her name. Pt's daughters were during visitor hours. Pt placed on comfort cares. She has not been eating. She only took a few sips of water on evening shift. Given Zofran ODT tab because pt was feeling nauseous. Will cont supportive cares. SW following for discharge. Per pt daughters - they are planning to meet about hospice at 11am tomorrow.     /60 (BP Location: Left arm)   Pulse 72   Temp 98.2  F (36.8  C) (Axillary)   Resp 16   Ht 1.651 m (5' 5\")   Wt 82.6 kg (182 lb)   SpO2 95%   BMI 30.29 kg/m      "

## 2021-01-08 NOTE — CONSULTS
"BRIEF NUTRITION ASSESSMENT      REASON FOR ASSESSMENT:  Leighann Regan is a 89 year old female seen by Registered Dietitian for LOS.    NUTRITION HISTORY:  - Information obtained from chart as patient now comfort cares with plans for hospice at discharge.      CURRENT DIET AND INTAKE:  Diet: Regular.    ANTHROPOMETRICS:  Height: 5' 5\"  Weight:  182 lbs 0 oz  Body mass index is 30.29 kg/m .   Weight Status: Obesity Grade I BMI 30-34.9  Weight History:  Wt Readings from Last 10 Encounters:   01/03/21 82.6 kg (182 lb)   07/26/18 83.1 kg (183 lb 3.2 oz)       LABS:  Labs noted    MALNUTRITION:  Deferred.    NUTRITION INTERVENTION:  Nutrition Diagnosis:  Deferred.    Implementation:  Nutrition Education: Not appropriate at this time given goals of care.    FOLLOW UP/MONITORING:   Will not follow per policy.  RD sign-off.  Please formally consult if needs arise.        Maryann López RDN, LD  Clinical Dietitian  3rd floor/ICU: [phone]  All other floors: [phone]  Weekend/holiday: [phone]  "

## 2021-01-08 NOTE — DISCHARGE SUMMARY
[facility]  Discharge Summary  Name: Leighann Regan    MRN: [id number]  YOB: 1931    Age: 89 year old  Date of Discharge:  1/9/21   Date of Admission: 1/2/2021  Primary Care Provider: Corazon Frost  Discharge Physician:  Frances Sahu MD  Discharging Service:  Hospitalist      Discharge Diagnoses:  1.  Nausea and vomiting due to cholelithiasis and cholecystitis status post cholecystectomy  2.  Anorexia likely due to advanced dementia  3.  Possible UTI  4.  History of PPM  5.  Paroxysmal atrial fibrillation  6.  Hypertension     Follow-ups Needed After Discharge   Discharged to [facility] on hospice support    Unresulted Labs Ordered in the Past 30 Days of this Admission     No orders found from 10/16/2018 to 12/16/2018.        Hospital Course:  Leighann Regan is a 89 year old female with past medical history of advanced dementia, [place], bradycardia s/p pacemaker, PAF, HTN who resides at [facility] who was admitted on 1/2/2021 with nausea and vomiting and was found to have cholelithiasis and cholecystitis.  Patient underwent cholecystectomy on 1/3.  She has not been eating despite significant encouragement from nursing staff.    Unfortunately patient did not improve after cholecystectomy and has continued to refuse to eat.  Explained to the patient's family that I think this is due to her advanced dementia and not related to surgery or persistent symptoms related to her cholecystectomy.  Ultimately family has decided to enroll in hospice.  She will discharge [facility] with hospice support.    Nausea and vomiting due to cholelithiasis and cholecystitis s/p cholecystectomy    - initially felt to be due to possible UTI (see below)    - Found to have RUQ pain, US was done, cholelithiasis seen with stone in gallbladder neck    - On 1/3 underwent cholecystectomy (found to have impacted/infected stone in gallbladder neck)    - patient has continued to  refuse PO due to nausea and likely some pain (difficult to tell due to her baseline).  On exam she had no abdominal pain (did have pain in her LEs on my exam so I do feel that she does not have abdominal pain).  At this point I think that her dementia is the biggest issue which is keeping her from eating, discussed this extensively with her family    - LFTs (ALT/AST only) overall improved after cholecystectomy    - Ultimately transitioned to comfort care and hospice upon discharge     Comfort Cares: Spoke extensively with patient's two daughters who are her POA.  Explained that I am concerned that her poor PO intake at this point is due to her advanced dementia.  I am not optimistic that she will do well with return to [facility] as I do not think that she will eat.  Discussed discharge options including a higher level of care vs hospice.  Ultimately family elected to discharge on hospice. Discharge to [facility].     Possible UTI - Completed therapy    - UA appeared dirty in ED, no culture was sent    - started on ceftriaxone, continued 5 day course, therapy now completed     Advanced dementia    - at her baseline, family reports that she has declined rather significantly over the past few months    - As above not eating likely due to advanced dementia     Pacemaker    - was placed in [place]    - appears to be due to bradycardia     Parox a fib    - has been seen by cards    - no anticoagulation due to fall risk    - Toprol discontinued as she was transitioned to hospice     HTN    - Toprol discontinued as she is now on hospice     Addendum: Patient seen and examined on 1/9/2021. Discharge Summary done on 1/8/2021. Daughters are in room. Patient has been comfortable. Did not arouse for family. Family states she has been comfortable. Patient does mumble at me when  Try to wake, her. She appears comfortable. On exam: shows some tenderness in abdomen over RUQ. Has leg tenderness. s1s2 rrr, bowel sounds  "present. Lungs clear.    Discharge Disposition:  Discharged to [facility]     Allergies:  Allergies   Allergen Reactions     Codeine      Ibuprofen      Latex      Sulfa Drugs      Tramadol         Condition on Discharge:  Discharge condition: Terminal   Discharge vitals: Blood pressure 131/62, pulse 66, temperature 95.4  F (35.2  C), temperature source Axillary, resp. rate 18, height 1.651 m (5' 5\"), weight 82.6 kg (182 lb), SpO2 92 %.   Code status on discharge: DNR / DNI     History of Illness:  See detailed admission note for full details.    Physical Exam:  I did not examine patient on the day of discharge    Procedures other than Imaging:  Cholecystectomy     Imaging:  Results for orders placed or performed during the hospital encounter of 01/02/21   US Abdomen Limited    Narrative    US ABDOMEN LIMITED 1/3/2021 10:47 AM    CLINICAL HISTORY: Right upper quadrant pain with nausea.    TECHNIQUE: Limited abdominal ultrasound.    COMPARISON: None.    FINDINGS:    GALLBLADDER: Negative sonographic Whitehead's sign. Multiple gallstones  within the gallbladder lumen. The gallbladder neck stone is also  identified. No gallbladder wall thickening is demonstrated.    BILE DUCTS: There is no biliary dilatation. The common duct measures  7mm.    LIVER: Normal where seen.    RIGHT KIDNEY: No hydronephrosis.    PANCREAS: The visualized portions of the pancreas are normal.    No ascites.      Impression    IMPRESSION:  1.  Cholelithiasis. A gallbladder neck stone is noted. Negative  sonographic Whitehead's sign. The common duct is 7 mm.  2.  No other acute abnormality.    DENYS INFANTE MD        Consultations:  Consultations This Hospital Stay   SOCIAL WORK IP CONSULT  PHYSICAL THERAPY ADULT IP CONSULT  OCCUPATIONAL THERAPY ADULT IP CONSULT  SURGERY GENERAL IP CONSULT  CARE MANAGEMENT / SOCIAL WORK IP CONSULT     Recent Lab Results:  Recent Labs   Lab 01/06/21  0608 01/03/21  0551 01/02/21  2047   WBC 8.4 9.9 11.7*   HGB 12.5 " 12.4 14.3   HCT 37.0 37.9 43.8    102* 103*    175 213     Recent Labs   Lab 01/06/21  0608 01/05/21  0553 01/04/21  0538 01/02/21 2047 01/02/21 2047    139 139   < > 141   POTASSIUM 3.7 3.6 3.6   < > 3.8   CHLORIDE 109 110* 110*   < > 106   CO2 23 25 25   < > 24   ANIONGAP 7 4 4   < > 11   GLC 79 93 153*   < > 102*   BUN 15 17 17   < > 22   CR 0.71 0.71 0.74   < > 0.86   GFRESTIMATED 75 75 71   < > 59*   GFRESTBLACK 87 87 83   < > 69   ALESHA 8.3* 7.9* 7.8*   < > 9.1   PROTTOTAL 5.7* 5.4*  --   --  6.8   ALBUMIN 2.7* 2.5*  --   --  3.1*   BILITOTAL 0.7 0.9  --   --  0.9   ALKPHOS 87 80  --   --  92   AST 84* 165*  --   --  33   ALT 72* 110*  --   --  26    < > = values in this interval not displayed.          Pending Results:    Unresulted Labs Ordered in the Past 30 Days of this Admission     No orders found from 12/3/2020 to 1/3/2021.           Discharge Instructions and Follow-Up:   Discharge Orders      Activity    Your activity upon discharge: activity as tolerated     Reason for your hospital stay    You were hospitalized for abdominal pain and ultimately had your gallbladder removed.  Unfortunately you have not improved and continue to not want to eat.  You will discharge to [facility] with hospice support and we will focus on your comfort.     Activity    Your activity upon discharge: activity as tolerated     No CPR- Do NOT Intubate     Diet    Follow this diet upon discharge: Orders Placed This Encounter      Snacks/Supplements Adult: Boost Shake; Between Meals      Regular Diet Adult      Diet     Discharge Medications   Current Discharge Medication List      START taking these medications    Details   atropine 1 % ophthalmic solution Take 1-2 drops by mouth 4 times daily as needed for secretions  Qty: 1 Bottle, Refills: 0    Associated Diagnoses: Hospice care patient      HYDROmorphone, STANDARD CONC, (DILAUDID) 1 MG/ML oral solution Take 1 mL (1 mg) by mouth every 2 hours as  needed for severe pain  Qty: 30 mL, Refills: 0    Associated Diagnoses: Hospice care patient      LORazepam (LORAZEPAM INTENSOL) 2 MG/ML (HIGH CONC) solution Take 0.5 mLs (1 mg) by mouth every 4 hours as needed for agitation, anxiety or nausea  Qty: 10 mL, Refills: 0    Associated Diagnoses: Hospice care patient      OLANZapine zydis (ZYPREXA ZYDIS) 5 MG ODT Take 1 tablet (5 mg) by mouth every 6 hours as needed for agitation or other (nausea)  Qty: 12 tablet, Refills: 0    Associated Diagnoses: Hospice care patient      ondansetron (ZOFRAN-ODT) 4 MG ODT tab Take 1 tablet (4 mg) by mouth every 6 hours as needed for nausea or vomiting  Qty: 15 tablet, Refills: 0    Associated Diagnoses: Hospice care patient         CONTINUE these medications which have CHANGED    Details   acetaminophen (TYLENOL) 325 MG tablet Take 2 tablets (650 mg) by mouth every 8 hours as needed for mild pain  Qty: 30 tablet, Refills: 0    Associated Diagnoses: Hospice care patient      acetaminophen (TYLENOL) 650 MG suppository Place 1 suppository (650 mg) rectally every 4 hours as needed for fever  Qty: 7 suppository, Refills: 0    Associated Diagnoses: Hospice care patient      bisacodyl (DULCOLAX) 10 MG suppository Place 1 suppository (10 mg) rectally daily as needed for constipation  Qty: 5 suppository, Refills: 0    Associated Diagnoses: Hospice care patient         CONTINUE these medications which have NOT CHANGED    Details   Vale Protect (EUCERIN) external cream Apply topically 2 times daily as needed for dry skin or other (reddened skin with toileting until healed)      Ostomy Supplies (SKIN PREP WIPES) MISC as needed (blisters)         STOP taking these medications       aspirin 81 MG chewable tablet Comments:   Reason for Stopping:         bacitracin 500 UNIT/GM OINT Comments:   Reason for Stopping:         Calcium Carb-Cholecalciferol 600-800 MG-UNIT TABS Comments:   Reason for Stopping:         famotidine (PEPCID) 20 MG tablet  Comments:   Reason for Stopping:         furosemide (LASIX) 20 MG tablet Comments:   Reason for Stopping:         guaiFENesin (ROBITUSSIN) 100 MG/5ML SYRP Comments:   Reason for Stopping:         loperamide (IMODIUM A-D) 2 MG tablet Comments:   Reason for Stopping:         magnesium hydroxide (MILK OF MAGNESIA) 400 MG/5ML suspension Comments:   Reason for Stopping:         meclizine (ANTIVERT) 25 MG tablet Comments:   Reason for Stopping:         metoprolol succinate (TOPROL-XL) 50 MG 24 hr tablet Comments:   Reason for Stopping:         Multiple Vitamins-Minerals (PRESERVISION AREDS 2) CAPS Comments:   Reason for Stopping:         Neomycin-Bacitracin-Polymyxin (TRIPLE ANTIBIOTIC) 3.5-400-5000 OINT ointment Comments:   Reason for Stopping:         nitroFURantoin macrocrystal-monohydrate (MACROBID) 100 MG capsule Comments:   Reason for Stopping:         nystatin (MYCOSTATIN) 483636 UNIT/GM external powder Comments:   Reason for Stopping:         potassium chloride ER (KLOR-CON M) 20 MEQ CR tablet Comments:   Reason for Stopping:         senna-docusate (SENOKOT-S;PERICOLACE) 8.6-50 MG per tablet Comments:   Reason for Stopping:               Time Spent on this Encounter   I, Frances Sahu MD, discharged this patient today but I did not personally see the patient today and will not be billing for the patient's discharge.    John Barnes MD

## 2021-01-08 NOTE — PROGRESS NOTES
[facility]  Hospitalist Progress Note  Frances Sahu MD 01/08/21   Text Page  Pager: [phone] (7am-6pm)    Reason for Stay (Diagnosis): N/V, cholelithiasis and cholecystitis         Assessment and Plan:      Summary of Stay: Leighann Regan is a 89 year old female with past medical history of advanced dementia, [place] [facility], bradycardia s/p pacemaker, PAF, HTN who resides at [facility] who was admitted on 1/2/2021 with nausea and vomiting and was found to have cholelithiasis and cholecystitis.  Patient underwent cholecystectomy on 1/3.  She has not been eating despite significant encouragement from nursing staff.  I met with the patient's two daughters today to discuss discharge planning.  Expressed my concerns for her returning to [facility] given her extremely poor appetite which I think at this point is related to her advanced dementia.  Family has elected to enroll in hospice, will discharge to [facility] on Hospice tomorrow morning.    Nausea and vomiting due to cholelithiasis and cholecystitis s/p cholecystectomy    - initially felt to be due to possible UTI (see below)    - Found to have RUQ pain, US was done, cholelithiasis seen with stone in gallbladder neck    - On 1/3 underwent cholecystectomy (found to have impacted/infected stone in gallbladder neck)    - PRN anti-emetics, pain control: advance diet as able    - patient has continued to refuse PO due to nausea and likely some pain (difficult to tell due to her baseline).  On exam today she had no abdominal pain (did have pain in her LEs on my exam so I do feel that she does not have abdominal pain).  At this point I think that her dementia is the biggest issue which is keeping her from eating, discussed this extensively with her family    - LFTs (ALT/AST only) overall improving after cholecystectomy    - Transitioned to comfort cares and will discharge on hospice tomorrow    Comfort Cares: Spoke extensively  "with patient's two daughters who are her POA.  Explained that I am concerned that her poor PO intake at this point is due to her advanced dementia.  I am not optimistic that she will do well with return to [facility] as I do not think that she will eat.  Discussed discharge options including a higher level of care vs hospice.  Ultimately family elected to discharge on hospice. Discharge tomorrow to [facility].    Possible UTI    - UA appeared dirty in ED, no culture was sent    - started on ceftriaxone, continued 5 day course, therapy now completed     Advanced dementia    - at her baseline, family reports that she has declined rather significantly over the past few months    - lived at [facility]     Pacemaker    - was placed in [place]    - appears to be due to bradycardia     Parox a fib    - has been seen by cards    - no anticoagulation due to fall risk    - cont Toprol but lower dose due to bradycardia     HTN    - cont PTA Toprol    - has been paul, so will decrease Toprol to 25mg    Diet: Regular Diet Adult  Snacks/Supplements Adult: Boost Shake; Between Meals  Diet    DVT Prophylaxis: Stop anticoagulation due to transition to comfort cares  Mccurdy Catheter: not present  Code Status: No CPR- Do NOT Intubate      Disposition Plan   Expected discharge: Tomorrow, recommended to Discharge on hospice once safe disposition plan/ TCU bed available and to [facility] tomorrow.  Entered: Frances Sahu MD 01/08/2021, 10:30 AM       The patient's care was discussed with the Bedside Nurse, Care Coordinator/, Patient and Patient's Family.    Hospitalist Service  [facility]          Interval History (Subjective):      I saw the patient this morning.  She was sleeping.  She briefly opened her eyes.  I asked if she was hungry or thirsty and she stated \"thirsty\".  She has remained quite lethargic over the past 24 hours.  Will have assessment from [facility] " "to see if she could go back there on hospice.  [facility] does appear to have a bed for tomorrow.    I spoke with patient's daughters at the bedside.  All questions answered.                  Physical Exam:      Last Vital Signs:  /62 (BP Location: Left arm)   Pulse 66   Temp 95.4  F (35.2  C) (Axillary)   Resp 18   Ht 1.651 m (5' 5\")   Wt 82.6 kg (182 lb)   SpO2 92%   BMI 30.29 kg/m      General: Sleeping, briefly opened eyes, not very interactive   HEENT: Normocephalic and atraumatic, dry mucous membranes and tongue.  Cardiac: RRR, normal S1, S2. No m/g/r, 1+ bilateral LE edema.  Pulmonary: Normal chest rise, normal work of breathing.  Lungs CTAB without crackles or wheezing.  Abdomen: soft, non-tender, non-distended.  Normoactive bowel sounds, no guarding or rebound tenderness.  Extremities: no deformities.  Warm, well perfused.  Skin: no rashes or lesions.  Warm and Dry.  Neuro: Sleeping, briefly opened eyes but then went back to sleep  Psych: Sleeping. Only briefly opens eyes, not very interactive         Medications:      All current medications were reviewed with changes reflected in problem list.         Data:      All new lab and imaging data was reviewed.   Labs:  Recent Labs   Lab 01/06/21  0608 01/03/21  0551 01/02/21 2047   WBC 8.4 9.9 11.7*   HGB 12.5 12.4 14.3   HCT 37.0 37.9 43.8    102* 103*    175 213     Recent Labs   Lab 01/06/21  0608 01/05/21  0553 01/04/21  0538 01/02/21 2047 01/02/21 2047    139 139   < > 141   POTASSIUM 3.7 3.6 3.6   < > 3.8   CHLORIDE 109 110* 110*   < > 106   CO2 23 25 25   < > 24   ANIONGAP 7 4 4   < > 11   GLC 79 93 153*   < > 102*   BUN 15 17 17   < > 22   CR 0.71 0.71 0.74   < > 0.86   GFRESTIMATED 75 75 71   < > 59*   GFRESTBLACK 87 87 83   < > 69   ALESHA 8.3* 7.9* 7.8*   < > 9.1   PROTTOTAL 5.7* 5.4*  --   --  6.8   ALBUMIN 2.7* 2.5*  --   --  3.1*   BILITOTAL 0.7 0.9  --   --  0.9   ALKPHOS 87 80  --   --  92   AST 84* 165*  " --   --  33   ALT 72* 110*  --   --  26    < > = values in this interval not displayed.      Imaging:   Results for orders placed or performed during the hospital encounter of 01/02/21   US Abdomen Limited    Narrative    US ABDOMEN LIMITED 1/3/2021 10:47 AM    CLINICAL HISTORY: Right upper quadrant pain with nausea.    TECHNIQUE: Limited abdominal ultrasound.    COMPARISON: None.    FINDINGS:    GALLBLADDER: Negative sonographic Whitehead's sign. Multiple gallstones  within the gallbladder lumen. The gallbladder neck stone is also  identified. No gallbladder wall thickening is demonstrated.    BILE DUCTS: There is no biliary dilatation. The common duct measures  7mm.    LIVER: Normal where seen.    RIGHT KIDNEY: No hydronephrosis.    PANCREAS: The visualized portions of the pancreas are normal.    No ascites.      Impression    IMPRESSION:  1.  Cholelithiasis. A gallbladder neck stone is noted. Negative  sonographic Whitehead's sign. The common duct is 7 mm.  2.  No other acute abnormality.    DENYS INFANTE MD     No results found for this or any previous visit (from the past 24 hour(s)).    Frances Sahu MD

## 2021-01-09 NOTE — PROGRESS NOTES
Care Management Discharge Note    Discharge Date: 01/09/21       Discharge Disposition: Home Care, Assisted Living    Discharge Services: None    Discharge DME: None    Discharge Transportation: family or friend will provide    Private pay costs discussed: Per chart review, transportation costs discussed with pts family on 1/8/2021    Patient/Family in Agreement with the Plan: yes    Handoff Referral Completed: Yes    Additional Information:  MYLES reviewed notes. MYLES faxed discharge orders to [facility]. MYLES placed call to [facility] and left VM for nursing staff informing them that orders were faxed and discharge time planned at 9 AM.  MYLES was able to speak Vivien at [facility]. She stated understanding that pt is leaving [facility] at 9 AM and that orders have been faxed. MYLES also heard from on-call nurse with [facility] who reports that she has spoken with the admission nurse at the hospice home and they are expecting the pt this morning.    SAM Alvarez

## 2021-01-09 NOTE — PLAN OF CARE
Patient intermittently alert, arouses to voice and gentle touch. Does not respond when asked orientation questions. On comfort cares. Denies pain; no nonverbal indicators of pain present. Repositioned every 2 hours. Incontinent of bladder, changed and ketty cares completed. Pure wick in place. Poor appetite. Denies nausea. Plan to discharge at 0900 to [facility] via [facility] transport. Continuing with supportive cares and symptom management.

## 2021-01-09 NOTE — PLAN OF CARE
Patient/Family verbalized understanding of After Visit Summary, recommended follow up and was given an opportunity to ask questions.   Discharge medications sent home with patient/family:Not applicable   Discharged with other:HE    Pt discharging to [facility] on hospice care via HE transport.    OBSERVATION patient END time: 0915

## 2021-01-09 NOTE — PLAN OF CARE
[phone]    Pt sleeping most of shift. Awake and alert x1, able to state birthday, that she was in the hospital, and that month was January. Denied c/o pain other than a little bit of sore throat, also c/o dry mouth. Mouth swab offered. Pt c/o nausea with some dry-heaving, PRN PO compazine given. Incontinence cares PRN. Turned q2h. Abd incisions w/o drainage, though do have ketty-incision bruising. Additionally has scattered bruising on BUE. Blanchable redness noted on bottom. Plan for discharge tomorrow AM on hospice.

## 2021-01-12 ENCOUNTER — DOCUMENTATION ONLY (OUTPATIENT)
Dept: OTHER | Facility: CLINIC | Age: 86
End: 2021-01-12

## 2021-01-18 ENCOUNTER — TELEPHONE (OUTPATIENT)
Dept: SURGERY | Facility: CLINIC | Age: 86
End: 2021-01-18

## 2021-01-18 NOTE — TELEPHONE ENCOUNTER
[facility] Postoperative Call Note:     Leighann Regan was called for an update regarding her recovery. She underwent a laparoscopic cholecystectomy by Dr. Zarco on 1/03/21 . I spoke to her daughter and found out that after surgery Mrs. Regan took a turn for the worse and is now in hospice care. I told her if there is anything we can do she should feel free to reach out to our office.    Casey Mcwilliams PA-C       Please route or send letter to:  Primary Care Provider (PCP)

## (undated) DEVICE — ENDO SPONGE ENDOSTICK KITTNER 5MM CHERRY 37002010

## (undated) DEVICE — GLOVE PROTEXIS BLUE W/NEU-THERA 8.0  2D73EB80

## (undated) DEVICE — GLOVE PROTEXIS POWDER FREE SMT 7.0  2D72PT70X

## (undated) DEVICE — SYR 50ML LL W/O NDL 309653

## (undated) DEVICE — CLIP APPLIER ENDO 5MM M/L LIGAMAX EL5ML

## (undated) DEVICE — ENDO TROCAR BLUNT TIP KII BALLOON 12X100MM C0R47

## (undated) DEVICE — GLOVE PROTEXIS BLUE W/NEU-THERA 7.0  2D73EB70

## (undated) DEVICE — ENDO TROCAR FIRST ENTRY KII FIOS ADV FIX 05X100MM CFF03

## (undated) DEVICE — LINEN FULL SHEET 5511

## (undated) DEVICE — ENDO TROCAR SLEEVE KII Z-THREADED 05X100MM CTS02

## (undated) DEVICE — BLADE KNIFE SURG 11 371111

## (undated) DEVICE — SU VICRYL 0 UR-6 27" J603H

## (undated) DEVICE — ADH SKIN CLOSURE PREMIERPRO EXOFIN MICOR HV 0.5ML 3471

## (undated) DEVICE — GLOVE PROTEXIS POWDER FREE SMT 7.5  2D72PT75X

## (undated) DEVICE — PREP CHLORAPREP 26ML TINTED HI-LITE ORANGE 930815

## (undated) DEVICE — LINEN TOWEL PACK X10 5473

## (undated) DEVICE — SUCTION IRR STRYKERFLOW II W/TIP 250-070-520

## (undated) DEVICE — SOL NACL 0.9% IRRIG 3000ML BAG 2B7477

## (undated) DEVICE — GOWN LG DISP 9515

## (undated) DEVICE — Device

## (undated) DEVICE — SUCTION MANIFOLD NEPTUNE 2 SYS 4 PORT 0702-020-000

## (undated) DEVICE — LINEN POUCH DBL 5427

## (undated) DEVICE — ESU LIGASURE LAPAROSCOPIC BLUNT TIP SEALER 5MMX37CM LF1837

## (undated) DEVICE — LINEN HALF SHEET 5512

## (undated) DEVICE — BAG CLEAR TRASH 1.3M 39X33" P4040C

## (undated) DEVICE — ESU GROUND PAD ADULT W/CORD E7507

## (undated) DEVICE — GLOVE PROTEXIS POWDER FREE 7.5 ORTHOPEDIC 2D73ET75

## (undated) DEVICE — ENDO POUCH UNIV RETRIEVAL SYSTEM INZII 10MM CD001

## (undated) DEVICE — SU VICRYL 4-0 PS-2 18" UND J496H

## (undated) DEVICE — ESU CORD MONOPOLAR 10'  E0510

## (undated) RX ORDER — FENTANYL CITRATE 50 UG/ML
INJECTION, SOLUTION INTRAMUSCULAR; INTRAVENOUS
Status: DISPENSED
Start: 2021-01-03

## (undated) RX ORDER — NEOSTIGMINE METHYLSULFATE 1 MG/ML
VIAL (ML) INJECTION
Status: DISPENSED
Start: 2021-01-03

## (undated) RX ORDER — DEXAMETHASONE SODIUM PHOSPHATE 4 MG/ML
INJECTION, SOLUTION INTRA-ARTICULAR; INTRALESIONAL; INTRAMUSCULAR; INTRAVENOUS; SOFT TISSUE
Status: DISPENSED
Start: 2021-01-03

## (undated) RX ORDER — BUPIVACAINE HYDROCHLORIDE 5 MG/ML
INJECTION, SOLUTION EPIDURAL; INTRACAUDAL
Status: DISPENSED
Start: 2021-01-03

## (undated) RX ORDER — LIDOCAINE HYDROCHLORIDE 10 MG/ML
INJECTION, SOLUTION EPIDURAL; INFILTRATION; INTRACAUDAL; PERINEURAL
Status: DISPENSED
Start: 2021-01-03

## (undated) RX ORDER — ONDANSETRON 2 MG/ML
INJECTION INTRAMUSCULAR; INTRAVENOUS
Status: DISPENSED
Start: 2021-01-03

## (undated) RX ORDER — PROPOFOL 10 MG/ML
INJECTION, EMULSION INTRAVENOUS
Status: DISPENSED
Start: 2021-01-03

## (undated) RX ORDER — GLYCOPYRROLATE 0.2 MG/ML
INJECTION INTRAMUSCULAR; INTRAVENOUS
Status: DISPENSED
Start: 2021-01-03

## (undated) RX ORDER — ACETAMINOPHEN 325 MG/1
TABLET ORAL
Status: DISPENSED
Start: 2021-01-03

## (undated) RX ORDER — FENTANYL CITRATE-0.9 % NACL/PF 10 MCG/ML
PLASTIC BAG, INJECTION (ML) INTRAVENOUS
Status: DISPENSED
Start: 2021-01-03